# Patient Record
Sex: FEMALE | Race: WHITE | NOT HISPANIC OR LATINO | Employment: FULL TIME | URBAN - METROPOLITAN AREA
[De-identification: names, ages, dates, MRNs, and addresses within clinical notes are randomized per-mention and may not be internally consistent; named-entity substitution may affect disease eponyms.]

---

## 2017-02-20 RX ORDER — THYROID, PORCINE 90 MG/1
TABLET ORAL
Qty: 30 TABLET | Refills: 12 | Status: SHIPPED | OUTPATIENT
Start: 2017-02-20 | End: 2017-06-02 | Stop reason: SDUPTHER

## 2017-03-27 ENCOUNTER — TELEPHONE (OUTPATIENT)
Dept: OPHTHALMOLOGY | Facility: CLINIC | Age: 57
End: 2017-03-27

## 2017-03-29 ENCOUNTER — OFFICE VISIT (OUTPATIENT)
Dept: OPHTHALMOLOGY | Facility: CLINIC | Age: 57
End: 2017-03-29
Payer: COMMERCIAL

## 2017-03-29 DIAGNOSIS — H04.123 DRY EYES: ICD-10-CM

## 2017-03-29 DIAGNOSIS — E05.00 GRAVES' DISEASE: Primary | ICD-10-CM

## 2017-03-29 DIAGNOSIS — H53.2 DIPLOPIA: ICD-10-CM

## 2017-03-29 PROCEDURE — 92004 COMPRE OPH EXAM NEW PT 1/>: CPT | Mod: S$GLB,,,

## 2017-03-29 PROCEDURE — 99999 PR PBB SHADOW E&M-EST. PATIENT-LVL II: CPT | Mod: PBBFAC,,,

## 2017-03-29 NOTE — MR AVS SNAPSHOT
[facility] - Ophthalmology  [address]  [facility] [address]  Phone: [phone]  Fax: [phone]                  Alma Luu Manuel   3/29/2017 8:00 AM   Office Visit    Description:  Female : 1960   Provider:  Rodo Carreon MD   Department:  [facility] - Ophthalmology           Reason for Visit     Eye Problem           Diagnoses this Visit        Comments    Graves' disease    -  Primary     Diplopia         Dry eyes                To Do List           Future Appointments        Provider Department Dept Phone    2017 10:30 AM Rodo Carreon MD [facility] Ophthalmology [phone]      Goals (5 Years of Data)     None      [facility]     [facility] -  Assistance  Registered nurses in the [facility] Center provide clinical advisement, health education, appointment booking, and other advisory services.  Call for this free service at [phone].             Medications           Message regarding Medications     Verify the changes and/or additions to your medication regime listed below are the same as discussed with your clinician today.  If any of these changes or additions are incorrect, please notify your healthcare provider.             Verify that the below list of medications is an accurate representation of the medications you are currently taking.  If none reported, the list may be blank. If incorrect, please contact your healthcare provider. Carry this list with you in case of emergency.           Current Medications     ARMOUR THYROID 90 mg Tab TAKE 1 TABLET BY MOUTH DAILY    estradiol (VIVELLE-DOT) 0.1 mg/24 hr PTSW Place 1 patch onto the skin twice a week.    medroxyPROGESTERone (PROVERA) 10 MG tablet TK 1 T PO  ONCE D    progesterone (PROMETRIUM) 100 MG capsule Take 1 capsule (100 mg total) by mouth once daily.    testosterone 50 mg/5 gram (1 %) Gel Apply 2-3 drops topically every Mon, Wed, Fri. Apply 2-3 drops to wrist     thyroid, pork, (ARMOUR THYROID) 90 mg Tab Take 1 tablet (90 mg total) by mouth Daily.    hydrochlorothiazide (HYDRODIURIL) 25 MG tablet Take 1 tablet (25 mg total) by mouth once daily.    hydrochlorothiazide (HYDRODIURIL) 25 MG tablet TAKE 1 TABLET BY MOUTH DAILY           Clinical Reference Information           Allergies as of 3/29/2017     Azithromycin (Bulk)      Immunizations Administered on Date of Encounter - 3/29/2017     None      Instructions    Return 5/7 for punctal plugs       Smoking Cessation     If you would like to quit smoking:   You may be eligible for free services if you are a [place] resident and started smoking cigarettes before September 1, 1988.  Call the Smoking Cessation Trust ([facility]) toll free at [phone] or [phone].   Call 8-709-QUIT-NOW if you do not meet the above criteria.            Language Assistance Services     ATTENTION: Language assistance services are available, free of charge. Please call [phone].      ATENCIÓN: Si habla español, tiene a hatfield disposición servicios gratuitos de asistencia lingüística. Llame al [phone].     CHÚ Ý: N?u b?n nói Ti?ng Vi?t, có các d?ch v? h? tr? ngôn ng? mi?n phí dành cho b?n. G?i s? [phone].         [facility] complies with applicable Federal civil rights laws and does not discriminate on the basis of race, color, national origin, age, disability, or sex.

## 2017-03-29 NOTE — PROGRESS NOTES
HPI     Eye Problem    Additional comments: starting to have diplopia about once a week. when   tired, or after a few drinks. seeing stars , last week           Comments   Had complete eye exam 12/30/16  Dry eyes plugs have healped  S/p orbital decompression 2012 (bilat soft tissue with canthoplasties)       Last edited by Rodo Carreon MD on 3/29/2017  8:34 AM. (History)            Assessment /Plan     For exam results, see Encounter Report.    Graves' disease    Diplopia    Dry eyes        I have reviewed the patient history,review of systems,and findings as recorded by the technician and resident and accept.  Has lt Inf Oblique weakness prob secondary o head trauma  Will rtc for plugs

## 2017-04-07 ENCOUNTER — PROCEDURE VISIT (OUTPATIENT)
Dept: OPHTHALMOLOGY | Facility: CLINIC | Age: 57
End: 2017-04-07
Payer: COMMERCIAL

## 2017-04-07 VITALS — HEART RATE: 87 BPM | DIASTOLIC BLOOD PRESSURE: 101 MMHG | SYSTOLIC BLOOD PRESSURE: 161 MMHG

## 2017-04-07 DIAGNOSIS — H04.123 DRY EYES: Primary | ICD-10-CM

## 2017-04-07 PROCEDURE — 99499 UNLISTED E&M SERVICE: CPT | Mod: S$GLB,,,

## 2017-04-07 NOTE — PATIENT INSTRUCTIONS
Follow up with Dr. Darling to discuss inf lid sgjimi -- advised would almost assuredly  Be considered cosmetic but she wants to talk about it anyway.        Controlling High Blood Pressure  High blood pressure (hypertension) is often called the silent killer. This is because many people who have it dont know it. High blood pressure is defined as 140/90 mm Hg or higher. Know your blood pressure and remember to check it regularly. Doing so can save your life. Here are some things you can do to help control your blood pressure.    Choose heart-healthy foods  · Select low-salt, low-fat foods. Limit sodium intake to 2,400 mg per day or the amount suggested by your healthcare provider.  · Limit canned, dried, cured, packaged, and fast foods. These can contain a lot of salt.  · Eat 8 to 10 servings of fruits and vegetables every day.  · Choose lean meats, fish, or chicken.  · Eat whole-grain pasta, brown rice, and beans.  · Eat 2 to 3 servings of low-fat or fat-free dairy products.  · Ask your doctor about the DASH eating plan. This plan helps reduce blood pressure.  · When you go to a restaurant, ask that your meal be prepared with no added salt.  Maintain a healthy weight  · Ask your healthcare provider how many calories to eat a day. Then stick to that number.  · Ask your healthcare provider what weight range is healthiest for you. If you are overweight, a weight loss of only 3% to 5% of your body weight can help lower blood pressure. Generally, a good weight loss goal is to lose 10% of your body weight in a year.  · Limit snacks and sweets.  · Get regular exercise.  Get up and get active  · Choose activities you enjoy. Find ones you can do with friends or family. This includes bicycling, dancing, walking, and jogging.  · Park farther away from building entrances.  · Use stairs instead of the elevator.  · When you can, walk or bike instead of driving.  · [place] leaves, garden, or do household repairs.  · Be active at a  moderate to vigorous level of physical activity for at least 40 minutes for a minimum of 3 to 4 days a week.   Manage stress  · Make time to relax and enjoy life. Find time to laugh.  · Communicate your concerns with your loved ones and your healthcare provider.  · Visit with family and friends, and keep up with hobbies.  Limit alcohol and quit smoking  · Men should have no more than 2 drinks per day.  · Women should have no more than 1 drink per day.  · Talk with your healthcare provider about quitting smoking. Smoking significantly increases your risk for heart disease and stroke. Ask your healthcare provider about community smoking cessation programs and other options.  Medicines  If lifestyle changes arent enough, your healthcare provider may prescribe high blood pressure medicine. Take all medicines as prescribed. If you have any questions about your medicines, ask your healthcare provider before stopping or changing them.   © 9834-7636 [employer]. [address] [place] [address], [place] [address]. All rights reserved. This information is not intended as a substitute for professional medical care. Always follow your healthcare professional's instructions.

## 2017-04-07 NOTE — PROGRESS NOTES
HPI     Graves' pt s/p bilat decompressions. Residual dryness.  Occ diplopia but can resolve by changing head position       Last edited by Rodo Carreon MD on 4/7/2017 10:54 AM.         Assessment /Plan     For exam results, see Encounter Report.    Dry eyes        I have reviewed the patient history,review of systems,and findings as recorded by the technician and resident and accept.  After topical anes,5mm plugs inserted in inf punctum bilat.

## 2017-04-07 NOTE — Clinical Note
Had bilat decompressions several yrs ago,good result but Lower lids starting to sag a bit and she wants to talk about Tightening up (advised would prob not be covered since essentially cosmetic)

## 2017-04-20 ENCOUNTER — PATIENT MESSAGE (OUTPATIENT)
Dept: ENDOCRINOLOGY | Facility: CLINIC | Age: 57
End: 2017-04-20

## 2017-04-20 ENCOUNTER — PATIENT MESSAGE (OUTPATIENT)
Dept: OBSTETRICS AND GYNECOLOGY | Facility: CLINIC | Age: 57
End: 2017-04-20

## 2017-04-20 DIAGNOSIS — Z78.0 MENOPAUSE: ICD-10-CM

## 2017-04-21 RX ORDER — PROGESTERONE 100 MG/1
100 CAPSULE ORAL DAILY
Qty: 90 CAPSULE | Refills: 3 | Status: SHIPPED | OUTPATIENT
Start: 2017-04-21 | End: 2017-10-16

## 2017-04-21 RX ORDER — ESTRADIOL 0.1 MG/D
FILM, EXTENDED RELEASE TRANSDERMAL
Qty: 24 PATCH | Refills: 3 | Status: SHIPPED | OUTPATIENT
Start: 2017-04-21 | End: 2017-05-04 | Stop reason: SDUPTHER

## 2017-04-24 ENCOUNTER — TELEPHONE (OUTPATIENT)
Dept: ENDOCRINOLOGY | Facility: CLINIC | Age: 57
End: 2017-04-24

## 2017-04-24 ENCOUNTER — TELEPHONE (OUTPATIENT)
Dept: OBSTETRICS AND GYNECOLOGY | Facility: CLINIC | Age: 57
End: 2017-04-24

## 2017-04-24 RX ORDER — THYROID 90 MG/1
1 TABLET ORAL DAILY
Qty: 96 TABLET | Refills: 3 | Status: CANCELLED | OUTPATIENT
Start: 2017-04-24

## 2017-04-24 NOTE — TELEPHONE ENCOUNTER
----- Message from Elo Jiménez sent at 4/24/2017 10:50 AM CDT -----  Contact: pt  x_  1st Request  _  2nd Request  _  3rd Request    Please refill the medication(s) listed below.     Medication #1medroxyPROGESTERone (PROVERA) 10 MG tablet  3 month supply     Medication #2estradiol (VIVELLE-DOT) 0.1 mg/24 hr PTSW 3 month supply       Preferred Pharmacy:[facility] [address] [place] [address] AT [address]

## 2017-05-03 ENCOUNTER — PATIENT MESSAGE (OUTPATIENT)
Dept: OBSTETRICS AND GYNECOLOGY | Facility: CLINIC | Age: 57
End: 2017-05-03

## 2017-05-04 DIAGNOSIS — Z78.0 MENOPAUSE: ICD-10-CM

## 2017-05-05 DIAGNOSIS — Z78.0 MENOPAUSE: ICD-10-CM

## 2017-05-05 RX ORDER — ESTRADIOL 0.1 MG/D
FILM, EXTENDED RELEASE TRANSDERMAL
Qty: 24 PATCH | Refills: 3 | Status: SHIPPED | OUTPATIENT
Start: 2017-05-05 | End: 2017-05-05 | Stop reason: SDUPTHER

## 2017-05-05 RX ORDER — ESTRADIOL 0.1 MG/D
FILM, EXTENDED RELEASE TRANSDERMAL
Qty: 25 PATCH | Refills: 3 | Status: SHIPPED | OUTPATIENT
Start: 2017-05-05 | End: 2018-07-05

## 2017-05-25 ENCOUNTER — INITIAL CONSULT (OUTPATIENT)
Dept: OPHTHALMOLOGY | Facility: CLINIC | Age: 57
End: 2017-05-25
Payer: COMMERCIAL

## 2017-05-25 DIAGNOSIS — H53.2 DIPLOPIA: ICD-10-CM

## 2017-05-25 DIAGNOSIS — H01.00A BLEPHARITIS OF UPPER AND LOWER EYELIDS OF BOTH EYES, UNSPECIFIED TYPE: ICD-10-CM

## 2017-05-25 DIAGNOSIS — H01.00B BLEPHARITIS OF UPPER AND LOWER EYELIDS OF BOTH EYES, UNSPECIFIED TYPE: ICD-10-CM

## 2017-05-25 DIAGNOSIS — E05.00 GRAVES' DISEASE: ICD-10-CM

## 2017-05-25 DIAGNOSIS — H04.123 DRY EYES: Primary | ICD-10-CM

## 2017-05-25 PROCEDURE — 92014 COMPRE OPH EXAM EST PT 1/>: CPT | Mod: S$GLB,,, | Performed by: OPHTHALMOLOGY

## 2017-05-25 PROCEDURE — 99999 PR PBB SHADOW E&M-EST. PATIENT-LVL I: CPT | Mod: PBBFAC,,, | Performed by: OPHTHALMOLOGY

## 2017-05-25 RX ORDER — HYDROCODONE BITARTRATE AND ACETAMINOPHEN 5; 325 MG/1; MG/1
TABLET ORAL
Refills: 0 | COMMUNITY
Start: 2017-04-20 | End: 2019-01-16

## 2017-05-25 RX ORDER — DOXYCYCLINE HYCLATE 50 MG/1
50 CAPSULE ORAL 2 TIMES DAILY
Qty: 60 CAPSULE | Refills: 1 | Status: SHIPPED | OUTPATIENT
Start: 2017-05-25 | End: 2018-02-21

## 2017-05-25 RX ORDER — CLONAZEPAM 0.5 MG/1
TABLET ORAL
Refills: 3 | COMMUNITY
Start: 2017-04-20 | End: 2018-11-29

## 2017-05-25 RX ORDER — ERGOCALCIFEROL 1.25 MG/1
50000 CAPSULE ORAL
Refills: 2 | COMMUNITY
Start: 2017-04-18 | End: 2017-10-16

## 2017-05-25 NOTE — LETTER
May 25, 2017      Rodo Carreon MD  [address]  [facility] [address]           [facility] - Ophthalmology  [address]  [place] [address]  Phone: [phone]  Fax: [phone]          Patient: Alma Jackson   MR Number: [id number]   YOB: 1960   Date of Visit: 5/25/2017       Dear Dr. Rodo Carreon:    Thank you for referring Alma Jackson to me for evaluation. Attached you will find relevant portions of my assessment and plan of care.    If you have questions, please do not hesitate to call me. I look forward to following Alma Jackson along with you.    Sincerely,    Elodia Darling MD    Enclosure  CC:  No Recipients    If you would like to receive this communication electronically, please contact [email or web address] or [phone] to request more information on [employer] access.    For providers and/or their staff who would like to refer a patient to [facility], please contact us through our one-stop-shop provider referral line, [facility], at [phone].    If you feel you have received this communication in error or would no longer like to receive these types of communications, please e-mail [email or web address]

## 2017-06-02 ENCOUNTER — TELEPHONE (OUTPATIENT)
Dept: ENDOCRINOLOGY | Facility: CLINIC | Age: 57
End: 2017-06-02

## 2017-06-02 ENCOUNTER — OFFICE VISIT (OUTPATIENT)
Dept: ENDOCRINOLOGY | Facility: CLINIC | Age: 57
End: 2017-06-02
Payer: COMMERCIAL

## 2017-06-02 VITALS
HEIGHT: 62 IN | DIASTOLIC BLOOD PRESSURE: 91 MMHG | SYSTOLIC BLOOD PRESSURE: 153 MMHG | WEIGHT: 148.56 LBS | BODY MASS INDEX: 27.34 KG/M2 | HEART RATE: 86 BPM

## 2017-06-02 DIAGNOSIS — E07.9 THYROID EYE DISEASE: ICD-10-CM

## 2017-06-02 DIAGNOSIS — H57.89 THYROID EYE DISEASE: ICD-10-CM

## 2017-06-02 DIAGNOSIS — E55.9 VITAMIN D DEFICIENCY: ICD-10-CM

## 2017-06-02 DIAGNOSIS — I49.9 IRREGULAR HEART BEAT: ICD-10-CM

## 2017-06-02 DIAGNOSIS — E89.0 POSTABLATIVE HYPOTHYROIDISM: Primary | ICD-10-CM

## 2017-06-02 PROCEDURE — 99999 PR PBB SHADOW E&M-EST. PATIENT-LVL IV: CPT | Mod: PBBFAC,,, | Performed by: INTERNAL MEDICINE

## 2017-06-02 PROCEDURE — 99214 OFFICE O/P EST MOD 30 MIN: CPT | Mod: S$GLB,,, | Performed by: INTERNAL MEDICINE

## 2017-06-02 RX ORDER — THYROID 90 MG/1
TABLET ORAL
Qty: 102 TABLET | Refills: 3 | Status: SHIPPED | OUTPATIENT
Start: 2017-06-02 | End: 2017-07-11 | Stop reason: SDUPTHER

## 2017-06-02 NOTE — TELEPHONE ENCOUNTER
----- Message from Reena Sharona sent at 6/2/2017 12:48 PM CDT -----  Contact: [phone]   Alma Reyessays she wants to be seen today.   Says she is having Thyroid problems.      Pls call today w/ an appt. .  Will see any dr..   As per caller. .

## 2017-06-02 NOTE — TELEPHONE ENCOUNTER
Spoke with patient whom stated it is an emergency that she be seen today because she has been trying to get in with

## 2017-06-02 NOTE — PROGRESS NOTES
"Chief Complaint: Hypothyroidism      HPI:  Alma Jackson is 56 y.o. female with preexisting thyroid disease.  She is a prior patient of Dr. Rodriguez with last documented office visit in 07/2016     She presents to clinic today for an Urgent appointment to discuss abnormal lab results     She was originally diagnosed with Graves disease and is s/p LICEA. She cannot remember how long ago this was.     Originally on Levothyroxine but she switched to [place] thyroid "many years ago"   She states she feels better on [place] thyroid     She had labs in 04/2017 per PCP that showed TSH - 6.94   The patient messaged Dr. Rodriguez about the results and he responded with a dose adjustment but the patient states she did not receive the message     Today, she endorses hair loss, weight gain and anxiety   She is taking [place] Thyroid 90 mg daily      Review of Systems   Constitutional: Positive for fatigue and unexpected weight change.   Eyes: Positive for visual disturbance.   Respiratory: Negative for cough and shortness of breath.    Cardiovascular: Negative for palpitations.   Endocrine: Negative for cold intolerance and heat intolerance.   Musculoskeletal: Positive for myalgias.   Skin: Negative for rash.        Hair loss    Psychiatric/Behavioral: The patient is nervous/anxious.        Physical Exam   Constitutional: She is oriented to person, place, and time. No distress.   Neck: No thyromegaly present.   Cardiovascular: Normal rate.    Pulmonary/Chest: Effort normal.   Abdominal: Soft.   Musculoskeletal: She exhibits no edema.   Neurological: She is alert and oriented to person, place, and time.   Nursing note and vitals reviewed.      Labs: from 4/13/2017  Outside labs per [facility]   TSH - 6.94 mIU/L   Free T4 - 0.6 ng/dL   Vitamin D - 17   Labs scanned in media tab   Lab Results   Component Value Date    TSH 0.438 07/12/2016          Assessment and Plan:  1. Postablative hypothyroidism  - per Dr. Rodriguez " instructions , advised to take 1.5 tabs on Sunday and 1 tab other days of the week   - offered to recheck TSH today, pt declined   - states she will start taking Universal thyroid as Dr. Rodriguez directed   - repeat TSH in 6 weeks   - pt requested follow up with Dr. Rodriguez for 10/2017 , will arrange today   - goal of therapy is a normal TSH   - avoid exogenous hyperthyroidism as this can accelerate bone loss and increase risk of CV complications   -     thyroid, pork, (ARMOUR THYROID) 90 mg Tab; Take 1.5 tabs on Sunday only, then take 1 tablet all other days  Dispense: 102 tablet; Refill: 3  -     TSH; Future; Expected date: 06/02/2017    2. Thyroid eye disease - Both Eyes  - followed by Ophthalmology     3. Irregular heart beat  - followed by Cardiology     4. Vitamin D deficiency  - continue Ergo as prescribed   - start otc Vitamin D3 at 1,000 IU's daily   - repeat level in 6 weeks   -     Vitamin D; Future; Expected date: 06/02/2017      Labs in 6 weeks   Follow up with Dr. Rodriguez in 10/2017

## 2017-07-06 ENCOUNTER — OFFICE VISIT (OUTPATIENT)
Dept: OPHTHALMOLOGY | Facility: CLINIC | Age: 57
End: 2017-07-06
Payer: COMMERCIAL

## 2017-07-06 DIAGNOSIS — E05.00 GRAVES' DISEASE: ICD-10-CM

## 2017-07-06 DIAGNOSIS — H01.00A BLEPHARITIS OF UPPER AND LOWER EYELIDS OF BOTH EYES, UNSPECIFIED TYPE: ICD-10-CM

## 2017-07-06 DIAGNOSIS — H01.00B BLEPHARITIS OF UPPER AND LOWER EYELIDS OF BOTH EYES, UNSPECIFIED TYPE: ICD-10-CM

## 2017-07-06 DIAGNOSIS — H04.123 DRY EYES: Primary | ICD-10-CM

## 2017-07-06 PROCEDURE — 92014 COMPRE OPH EXAM EST PT 1/>: CPT | Mod: 25,S$GLB,, | Performed by: OPHTHALMOLOGY

## 2017-07-06 PROCEDURE — 68761 CLOSE TEAR DUCT OPENING: CPT | Mod: 50,S$GLB,, | Performed by: OPHTHALMOLOGY

## 2017-07-06 PROCEDURE — 99999 PR PBB SHADOW E&M-EST. PATIENT-LVL III: CPT | Mod: PBBFAC,,, | Performed by: OPHTHALMOLOGY

## 2017-07-06 NOTE — PROGRESS NOTES
HPI     Follow-up    Additional comments: 2 week follow-up           Comments   Pt here for 2 week follow-up, dry eyes and blepharitis.  Pt states she is doing well, diplopia has decreased and eyes are not as   irritated.   States eyes are still dry, but have improved. She does not feel to be 100%   better but has noticed a difference.  Pt complaints of decrease in vision, states she was 20/20 OU in December 2016.  Meds: Systane TID              Systane night at bedtime?       Last edited by Mae Huggins, PCT on 7/6/2017  1:43 PM. (History)            Assessment /Plan     For exam results, see Encounter Report.    Dry eyes    Blepharitis of upper and lower eyelids of both eyes, unspecified type    Graves' disease      Plan for punctal plug placement bilateral upper eyelids.  Discussed risk of excess tearing and irritation and the plugs may need to be removed in the future.    Procedure note:  OD rul punctum: 0.4 mm eagle punctal plug placed without complication  OS ej punctum: 0.4 mm eagle punctal plug placed without complication    Informed consent obtained after extensive risks/benefits/alternatives were discussed with the patient including but not limited to pain, bleeding, infection, ocular injury.  Alternatives such as waiting were discussed.  All questions were answered.        Continue lid hygiene and warm compresses, artificial tears and ointment.    Return in three months prn sooner any worsening.

## 2017-07-07 ENCOUNTER — LAB VISIT (OUTPATIENT)
Dept: LAB | Facility: HOSPITAL | Age: 57
End: 2017-07-07
Attending: INTERNAL MEDICINE
Payer: COMMERCIAL

## 2017-07-07 DIAGNOSIS — E55.9 VITAMIN D DEFICIENCY: ICD-10-CM

## 2017-07-07 DIAGNOSIS — E89.0 POSTABLATIVE HYPOTHYROIDISM: ICD-10-CM

## 2017-07-07 LAB
25(OH)D3+25(OH)D2 SERPL-MCNC: 32 NG/ML
T4 FREE SERPL-MCNC: 0.72 NG/DL
TSH SERPL DL<=0.005 MIU/L-ACNC: 6.01 UIU/ML

## 2017-07-07 PROCEDURE — 84443 ASSAY THYROID STIM HORMONE: CPT

## 2017-07-07 PROCEDURE — 36415 COLL VENOUS BLD VENIPUNCTURE: CPT | Mod: PO

## 2017-07-07 PROCEDURE — 84439 ASSAY OF FREE THYROXINE: CPT

## 2017-07-07 PROCEDURE — 82306 VITAMIN D 25 HYDROXY: CPT

## 2017-07-11 ENCOUNTER — PATIENT MESSAGE (OUTPATIENT)
Dept: ENDOCRINOLOGY | Facility: CLINIC | Age: 57
End: 2017-07-11

## 2017-07-11 DIAGNOSIS — E89.0 POSTABLATIVE HYPOTHYROIDISM: Primary | ICD-10-CM

## 2017-07-11 RX ORDER — THYROID 90 MG/1
TABLET ORAL
Qty: 102 TABLET | Refills: 3
Start: 2017-07-11 | End: 2018-06-08 | Stop reason: SDUPTHER

## 2017-07-18 RX ORDER — ESTRADIOL 0.1 MG/D
FILM, EXTENDED RELEASE TRANSDERMAL
Qty: 8 PATCH | Refills: 0 | OUTPATIENT
Start: 2017-07-18

## 2017-08-25 ENCOUNTER — LAB VISIT (OUTPATIENT)
Dept: LAB | Facility: HOSPITAL | Age: 57
End: 2017-08-25
Attending: INTERNAL MEDICINE
Payer: COMMERCIAL

## 2017-08-25 DIAGNOSIS — E89.0 POSTABLATIVE HYPOTHYROIDISM: ICD-10-CM

## 2017-08-25 LAB — TSH SERPL DL<=0.005 MIU/L-ACNC: 2.18 UIU/ML

## 2017-08-25 PROCEDURE — 36415 COLL VENOUS BLD VENIPUNCTURE: CPT | Mod: PO

## 2017-08-25 PROCEDURE — 84443 ASSAY THYROID STIM HORMONE: CPT

## 2017-09-26 ENCOUNTER — TELEPHONE (OUTPATIENT)
Dept: OBSTETRICS AND GYNECOLOGY | Facility: CLINIC | Age: 57
End: 2017-09-26

## 2017-09-26 DIAGNOSIS — R53.83 FATIGUE, UNSPECIFIED TYPE: ICD-10-CM

## 2017-09-26 DIAGNOSIS — N95.1 MENOPAUSAL SYNDROME (HOT FLASHES): Primary | ICD-10-CM

## 2017-10-16 ENCOUNTER — OFFICE VISIT (OUTPATIENT)
Dept: OPHTHALMOLOGY | Facility: CLINIC | Age: 57
End: 2017-10-16
Payer: COMMERCIAL

## 2017-10-16 ENCOUNTER — OFFICE VISIT (OUTPATIENT)
Dept: ENDOCRINOLOGY | Facility: CLINIC | Age: 57
End: 2017-10-16
Payer: COMMERCIAL

## 2017-10-16 ENCOUNTER — LAB VISIT (OUTPATIENT)
Dept: LAB | Facility: HOSPITAL | Age: 57
End: 2017-10-16
Attending: OBSTETRICS & GYNECOLOGY
Payer: COMMERCIAL

## 2017-10-16 VITALS
WEIGHT: 150.56 LBS | HEIGHT: 62 IN | BODY MASS INDEX: 27.7 KG/M2 | HEART RATE: 73 BPM | SYSTOLIC BLOOD PRESSURE: 128 MMHG | DIASTOLIC BLOOD PRESSURE: 76 MMHG

## 2017-10-16 DIAGNOSIS — R53.83 FATIGUE, UNSPECIFIED TYPE: ICD-10-CM

## 2017-10-16 DIAGNOSIS — E05.00 GRAVES' DISEASE WITH EXOPHTHALMOS: ICD-10-CM

## 2017-10-16 DIAGNOSIS — H01.00A BLEPHARITIS OF UPPER AND LOWER EYELIDS OF BOTH EYES, UNSPECIFIED TYPE: ICD-10-CM

## 2017-10-16 DIAGNOSIS — H04.123 DRY EYES: ICD-10-CM

## 2017-10-16 DIAGNOSIS — E89.0 POSTABLATIVE HYPOTHYROIDISM: Primary | ICD-10-CM

## 2017-10-16 DIAGNOSIS — N95.1 MENOPAUSAL SYNDROME (HOT FLASHES): ICD-10-CM

## 2017-10-16 DIAGNOSIS — H01.00B BLEPHARITIS OF UPPER AND LOWER EYELIDS OF BOTH EYES, UNSPECIFIED TYPE: ICD-10-CM

## 2017-10-16 DIAGNOSIS — E05.00 GRAVES DISEASE: Primary | ICD-10-CM

## 2017-10-16 LAB — ESTRADIOL SERPL-MCNC: 17 PG/ML

## 2017-10-16 PROCEDURE — 99999 PR PBB SHADOW E&M-EST. PATIENT-LVL III: CPT | Mod: PBBFAC,,, | Performed by: INTERNAL MEDICINE

## 2017-10-16 PROCEDURE — 84402 ASSAY OF FREE TESTOSTERONE: CPT

## 2017-10-16 PROCEDURE — 36415 COLL VENOUS BLD VENIPUNCTURE: CPT

## 2017-10-16 PROCEDURE — 99999 PR PBB SHADOW E&M-EST. PATIENT-LVL III: CPT | Mod: PBBFAC,,, | Performed by: OPHTHALMOLOGY

## 2017-10-16 PROCEDURE — 92012 INTRM OPH EXAM EST PATIENT: CPT | Mod: S$GLB,,, | Performed by: OPHTHALMOLOGY

## 2017-10-16 PROCEDURE — 99214 OFFICE O/P EST MOD 30 MIN: CPT | Mod: S$GLB,,, | Performed by: INTERNAL MEDICINE

## 2017-10-16 PROCEDURE — 82670 ASSAY OF TOTAL ESTRADIOL: CPT

## 2017-10-16 RX ORDER — BACLOFEN 10 MG/1
10 TABLET ORAL
Refills: 1 | COMMUNITY
Start: 2017-08-31 | End: 2023-02-24

## 2017-10-16 NOTE — PROGRESS NOTES
HPI     Mrs. Marquez is here today for a dry eye follow-up last seen on 7/6/2017.   She states she is doing well. She has no complaints today. She denies any   eye pain or irritation. She is using AT (unsure of name) drops OU BID    Last edited by Mae Huggins, PCT on 10/16/2017  9:54 AM. (History)            Assessment /Plan     For exam results, see Encounter Report.    Graves disease    Dry eyes    Blepharitis of upper and lower eyelids of both eyes, unspecified type    Other orders  -     Cancel: External/Slit Lamp Photography    Overall, patient doing great!    Bilateral upper eyelid punctal plugs have fallen out since last visit. Patient comfortable.      Patient with blepharitis. Continue warm compresses daily. Continue lid scrubs. Continue doxycycline 50 mg po bid. Continue using mask at night to prevent further irritation.    Return to Dr. Martinez for routine eye care.     Return in one year for follow-up check

## 2017-10-16 NOTE — PROGRESS NOTES
"Subjective:      Patient ID: Alma Jackson is a 57 y.o. female.    Chief Complaint:  Hypothyroidism      History of Present Illness  54 year old female with hypothyroidism presents for follow up of disease management. Pt was originally diagnosed with Graves' disease and is now s/p LICEA therapy. She cannot remember how long ago this was. She was originally on synthroid, but switched to [place] thyroid 120 mg daily. She states that she feels better on the armour thyroid than synthroid. She takes her medication in the am on an empty stomach. She takes it with her HCTZ. Pt is extremely frustrated as she continues to feel extreme fatigue that has become worse in the last 3 months. She also reports muscle aches in her extremies, most notably in her hips and lower extremities. She states the pain is "like a headache". She states that it is constant and 5-6/10. She reports sleeping 5 hours at night and wakes up unrefreshed because she is in pain when she sleeps. She does not notice the pain when she is at work. She has tried taking celebrex in the past with no relief. She has also tried percocet and this provides relief. She reports heat intolerance, but denies any hair loss, skin changes, change in mood. She also reports weight gain of 40 lbs over several months. She exercises frequently and maintains a healthy diet.     Review of Systems   Constitutional: Positive for fatigue and unexpected weight change (gain). Negative for activity change and appetite change.   HENT: Negative for trouble swallowing and voice change.    Respiratory: Negative for shortness of breath and wheezing.    Cardiovascular: Negative for chest pain and palpitations.   Gastrointestinal: Negative for constipation, diarrhea, nausea and vomiting.   Endocrine: Positive for heat intolerance. Negative for cold intolerance.   Musculoskeletal: Positive for arthralgias and myalgias. Negative for gait problem and joint swelling.   Skin: Negative for " pallor and rash.   Neurological: Negative for tremors and headaches.   Psychiatric/Behavioral: Positive for agitation. Negative for dysphoric mood. The patient is not nervous/anxious.        Objective:   Physical Exam   Constitutional: She is oriented to person, place, and time. She appears well-developed and well-nourished. No distress.   Eyes: Conjunctivae and EOM are normal. Pupils are equal, round, and reactive to light.   Neck: Normal range of motion. Neck supple. No thyromegaly present.   Cardiovascular: Normal rate, regular rhythm, normal heart sounds and intact distal pulses.  Exam reveals no gallop and no friction rub.    No murmur heard.  Pulmonary/Chest: Effort normal and breath sounds normal. No respiratory distress. She has no wheezes. She has no rales.   Abdominal: Soft. Bowel sounds are normal. She exhibits no distension and no mass. There is no tenderness.   Musculoskeletal: Normal range of motion. She exhibits tenderness (Multiple points of tenderness in knees, hips, neck, elbows; no swelling or redness). She exhibits no edema.   Neurological: She is alert and oriented to person, place, and time.   Skin: Skin is warm and dry. No rash noted. She is not diaphoretic. No erythema.   Psychiatric: She has a normal mood and affect.       Lab Review:   Lab Visit on 08/25/2017   Component Date Value    TSH 08/25/2017 2.183    Lab Visit on 07/07/2017   Component Date Value    TSH 07/07/2017 6.015*    Vit D, 25-Hydroxy 07/07/2017 32     Free T4 07/07/2017 0.72    Office Visit on 04/24/2017   Component Date Value    Right Vent (Diastole) 05/02/2017 2.1     Septum (Diastole) 05/02/2017 1.0     Left Ventricle (Diastole) 05/02/2017 5.1     Posterior Wall (Diastole) 05/02/2017 0.94     Aortic Valve (Diastole) 05/02/2017 1.7     Aortic Root (Diastole) 05/02/2017 2.8     Left Atrium (Diastole) 05/02/2017 3.5     Left Ventricle (Systole) 05/02/2017 3.5     EF 05/02/2017 62     Aortic Peak Gradient  05/02/2017 7     Aortic Mean Gradient 05/02/2017 3     Aortic Peak Velocity 05/02/2017 1.3     Mitral Valve Area 05/02/2017 5     Mitral Valve E-A Ratio 05/02/2017 0.7*    Mitral Valve E-E prime 05/02/2017 5.5     Pulmonary Artery Pressure 05/02/2017 21        Assessment:       Plan:     54 year old female who presents for disease management.    1. Hypothyroidism with normal TSH  --Continue armour thyroid 5/7 days a week      RTC in 6 months or sooner if problems arise.

## 2017-10-17 LAB — TESTOST FREE SERPL-MCNC: 0.9 PG/ML

## 2017-10-18 ENCOUNTER — PATIENT MESSAGE (OUTPATIENT)
Dept: OBSTETRICS AND GYNECOLOGY | Facility: CLINIC | Age: 57
End: 2017-10-18

## 2017-10-24 ENCOUNTER — OFFICE VISIT (OUTPATIENT)
Dept: OBSTETRICS AND GYNECOLOGY | Facility: CLINIC | Age: 57
End: 2017-10-24
Attending: OBSTETRICS & GYNECOLOGY
Payer: COMMERCIAL

## 2017-10-24 VITALS
SYSTOLIC BLOOD PRESSURE: 130 MMHG | BODY MASS INDEX: 27.86 KG/M2 | WEIGHT: 152.31 LBS | DIASTOLIC BLOOD PRESSURE: 82 MMHG

## 2017-10-24 DIAGNOSIS — Z78.0 MENOPAUSE: ICD-10-CM

## 2017-10-24 DIAGNOSIS — Z12.4 PAP SMEAR FOR CERVICAL CANCER SCREENING: Primary | ICD-10-CM

## 2017-10-24 DIAGNOSIS — Z01.419 WELL WOMAN EXAM WITH ROUTINE GYNECOLOGICAL EXAM: ICD-10-CM

## 2017-10-24 PROCEDURE — 99396 PREV VISIT EST AGE 40-64: CPT | Mod: S$GLB,,, | Performed by: OBSTETRICS & GYNECOLOGY

## 2017-10-24 PROCEDURE — 87624 HPV HI-RISK TYP POOLED RSLT: CPT

## 2017-10-24 PROCEDURE — 88175 CYTOPATH C/V AUTO FLUID REDO: CPT

## 2017-10-24 NOTE — PROGRESS NOTES
Subjective:       Patient ID: Alma Jackson is a 57 y.o. female.    Chief Complaint:  Well Woman      History of Present Illness  HPI  This 57 yr old P2 female is here for routine exam and has low estradiol on estradiol patch (vivelle 0.1) and provera.  She doesn't feel well. She had some break through bleeding last yr and did ultrasound    GYN & OB History  No LMP recorded. Patient is postmenopausal.   Date of Last Pap: 2014    OB History    Para Term  AB Living   2 2           SAB TAB Ectopic Multiple Live Births                  # Outcome Date GA Lbr Arvind/2nd Weight Sex Delivery Anes PTL Lv   2 Para            1 Para                   Review of Systems  Review of Systems   Constitutional: Positive for fatigue. Negative for chills and fever.   Respiratory: Negative for shortness of breath.    Cardiovascular: Negative for chest pain.   Gastrointestinal: Negative for abdominal pain, nausea and vomiting.   Genitourinary: Negative for difficulty urinating, dyspareunia, genital sores, menstrual problem, pelvic pain, vaginal bleeding, vaginal discharge and vaginal pain.   Skin: Negative for wound.   Hematological: Negative for adenopathy.   Psychiatric/Behavioral: Positive for decreased concentration and sleep disturbance.           Objective:    Physical Exam:   Constitutional: She is oriented to person, place, and time. She appears well-developed and well-nourished.    HENT:   Head: Normocephalic.    Eyes: EOM are normal.    Neck: Normal range of motion.    Cardiovascular: Normal rate.     Pulmonary/Chest: Effort normal. She exhibits no mass and no tenderness. Right breast exhibits no inverted nipple, no mass, no skin change and no tenderness. Left breast exhibits no inverted nipple, no mass, no skin change and no tenderness.        Abdominal: Soft. She exhibits no distension. There is no tenderness.     Genitourinary: Vagina normal and uterus normal. There is no rash, tenderness or lesion  on the right labia. There is no rash, tenderness or lesion on the left labia. Uterus is not tender. Cervix is normal. Right adnexum displays no mass, no tenderness and no fullness. Left adnexum displays no mass, no tenderness and no fullness. Cervix exhibits no discharge.           Musculoskeletal: Normal range of motion.       Neurological: She is alert and oriented to person, place, and time.    Skin: Skin is warm and dry.    Psychiatric: She has a normal mood and affect.          Assessment:        1. Pap smear for cervical cancer screening    2. Well woman exam with routine gynecological exam    3. Menopause               Plan:       will change estradiol to pill from patio and prometrium to pill from patio  Pap today   Pt refuses mammogram but every two yrs.

## 2017-10-30 LAB
HPV16 AG SPEC QL: NEGATIVE
HPV16+18+H RISK 12 DNA CVX-IMP: NEGATIVE
HPV18 DNA SPEC QL NAA+PROBE: NEGATIVE

## 2018-01-11 ENCOUNTER — PATIENT MESSAGE (OUTPATIENT)
Dept: OBSTETRICS AND GYNECOLOGY | Facility: CLINIC | Age: 58
End: 2018-01-11

## 2018-01-11 DIAGNOSIS — R53.83 FATIGUE, UNSPECIFIED TYPE: ICD-10-CM

## 2018-01-11 DIAGNOSIS — N95.1 MENOPAUSAL SYMPTOMS: ICD-10-CM

## 2018-01-11 DIAGNOSIS — Z12.31 VISIT FOR SCREENING MAMMOGRAM: Primary | ICD-10-CM

## 2018-01-12 ENCOUNTER — HOSPITAL ENCOUNTER (OUTPATIENT)
Dept: RADIOLOGY | Facility: HOSPITAL | Age: 58
Discharge: HOME | End: 2018-01-12
Attending: OBSTETRICS & GYNECOLOGY
Payer: COMMERCIAL

## 2018-01-12 VITALS — HEIGHT: 62 IN | WEIGHT: 152 LBS | BODY MASS INDEX: 27.97 KG/M2

## 2018-01-12 DIAGNOSIS — Z12.31 VISIT FOR SCREENING MAMMOGRAM: ICD-10-CM

## 2018-01-12 PROCEDURE — 77063 BREAST TOMOSYNTHESIS BI: CPT | Mod: 26,,, | Performed by: RADIOLOGY

## 2018-01-12 PROCEDURE — 77067 SCR MAMMO BI INCL CAD: CPT | Mod: TC

## 2018-01-12 PROCEDURE — 77067 SCR MAMMO BI INCL CAD: CPT | Mod: 26,,, | Performed by: RADIOLOGY

## 2018-02-19 ENCOUNTER — LAB VISIT (OUTPATIENT)
Dept: LAB | Facility: HOSPITAL | Age: 58
End: 2018-02-19
Attending: OBSTETRICS & GYNECOLOGY
Payer: COMMERCIAL

## 2018-02-19 DIAGNOSIS — R53.83 FATIGUE, UNSPECIFIED TYPE: ICD-10-CM

## 2018-02-19 DIAGNOSIS — N95.1 MENOPAUSAL SYMPTOMS: ICD-10-CM

## 2018-02-19 LAB
DHEA-S SERPL-MCNC: 94.2 UG/DL
ESTRADIOL SERPL-MCNC: 16 PG/ML
PROGEST SERPL-MCNC: 5.8 NG/ML
T4 FREE SERPL-MCNC: 1.03 NG/DL
TSH SERPL DL<=0.005 MIU/L-ACNC: 0.11 UIU/ML

## 2018-02-19 PROCEDURE — 82627 DEHYDROEPIANDROSTERONE: CPT

## 2018-02-19 PROCEDURE — 84402 ASSAY OF FREE TESTOSTERONE: CPT

## 2018-02-19 PROCEDURE — 84439 ASSAY OF FREE THYROXINE: CPT

## 2018-02-19 PROCEDURE — 84443 ASSAY THYROID STIM HORMONE: CPT

## 2018-02-19 PROCEDURE — 82670 ASSAY OF TOTAL ESTRADIOL: CPT

## 2018-02-19 PROCEDURE — 84144 ASSAY OF PROGESTERONE: CPT

## 2018-02-19 PROCEDURE — 36415 COLL VENOUS BLD VENIPUNCTURE: CPT | Mod: PO

## 2018-02-21 ENCOUNTER — OFFICE VISIT (OUTPATIENT)
Dept: RHEUMATOLOGY | Facility: CLINIC | Age: 58
End: 2018-02-21
Payer: COMMERCIAL

## 2018-02-21 VITALS
HEART RATE: 81 BPM | SYSTOLIC BLOOD PRESSURE: 127 MMHG | BODY MASS INDEX: 27.07 KG/M2 | WEIGHT: 148 LBS | DIASTOLIC BLOOD PRESSURE: 87 MMHG

## 2018-02-21 DIAGNOSIS — M79.7 FIBROMYALGIA: Primary | ICD-10-CM

## 2018-02-21 DIAGNOSIS — M25.512 CHRONIC LEFT SHOULDER PAIN: ICD-10-CM

## 2018-02-21 DIAGNOSIS — G89.29 CHRONIC LEFT SHOULDER PAIN: ICD-10-CM

## 2018-02-21 PROCEDURE — 3008F BODY MASS INDEX DOCD: CPT | Mod: S$GLB,,, | Performed by: INTERNAL MEDICINE

## 2018-02-21 PROCEDURE — 99999 PR PBB SHADOW E&M-EST. PATIENT-LVL III: CPT | Mod: PBBFAC,,, | Performed by: INTERNAL MEDICINE

## 2018-02-21 PROCEDURE — 99214 OFFICE O/P EST MOD 30 MIN: CPT | Mod: S$GLB,,, | Performed by: INTERNAL MEDICINE

## 2018-02-21 RX ORDER — GABAPENTIN 100 MG/1
100 CAPSULE ORAL NIGHTLY
Qty: 30 CAPSULE | Refills: 3 | Status: SHIPPED | OUTPATIENT
Start: 2018-02-21 | End: 2018-07-05

## 2018-02-22 ENCOUNTER — OFFICE VISIT (OUTPATIENT)
Dept: OBSTETRICS AND GYNECOLOGY | Facility: CLINIC | Age: 58
End: 2018-02-22
Attending: OBSTETRICS & GYNECOLOGY
Payer: COMMERCIAL

## 2018-02-22 VITALS
HEIGHT: 62 IN | BODY MASS INDEX: 27.55 KG/M2 | SYSTOLIC BLOOD PRESSURE: 120 MMHG | WEIGHT: 149.69 LBS | DIASTOLIC BLOOD PRESSURE: 82 MMHG

## 2018-02-22 DIAGNOSIS — Z78.0 MENOPAUSE: Primary | ICD-10-CM

## 2018-02-22 PROCEDURE — 96372 THER/PROPH/DIAG INJ SC/IM: CPT | Mod: S$GLB,,, | Performed by: OBSTETRICS & GYNECOLOGY

## 2018-02-22 PROCEDURE — 99213 OFFICE O/P EST LOW 20 MIN: CPT | Mod: 25,S$GLB,, | Performed by: OBSTETRICS & GYNECOLOGY

## 2018-02-22 PROCEDURE — 3008F BODY MASS INDEX DOCD: CPT | Mod: S$GLB,,, | Performed by: OBSTETRICS & GYNECOLOGY

## 2018-02-22 RX ORDER — TESTOSTERONE CYPIONATE 200 MG/ML
50 INJECTION, SOLUTION INTRAMUSCULAR ONCE
Status: COMPLETED | OUTPATIENT
Start: 2018-02-22 | End: 2018-02-22

## 2018-02-22 RX ADMIN — TESTOSTERONE CYPIONATE 50 MG: 200 INJECTION, SOLUTION INTRAMUSCULAR at 12:02

## 2018-02-22 NOTE — PROGRESS NOTES
Alma Jackson received hormone therapy injection in clinic per Dr. Villegas, no complaints at this time , Injection given as ordered, tolerated well, no report of pain prior to or after injection. Return to clinic as scheduled.     Site -LB    Testosterone    50  mg  Depo Estradiol       10  mg    Clinic Supplied Medication

## 2018-02-22 NOTE — PROGRESS NOTES
Subjective:       Patient ID: Alma Jackson is a 57 y.o. female.    Chief Complaint:  Menopause      History of Present Illness  HPI  This pt is here for follow up on taking the E/P/T 2mg /300mg/5mg and no bump in either her estradiol or testoterone.  She is still just as symptomatic as before .  Progesterone worked well.  We discussed and will go to E/T injection today 10 mg and 50 mg and pt understands and agrees    GYN & OB History  No LMP recorded. Patient is postmenopausal.   Date of Last Pap: 10/30/2017    OB History    Para Term  AB Living   3 3 3         SAB TAB Ectopic Multiple Live Births                  # Outcome Date GA Lbr Arvind/2nd Weight Sex Delivery Anes PTL Lv   3 Term            2 Term            1 Term                   Review of Systems  Review of Systems   Constitutional: Positive for fatigue. Negative for chills and fever.   Respiratory: Negative for shortness of breath.    Cardiovascular: Negative for chest pain.   Gastrointestinal: Negative for abdominal pain, nausea and vomiting.   Endocrine: Positive for heat intolerance.   Genitourinary: Negative for difficulty urinating, dyspareunia, genital sores, menstrual problem, pelvic pain, vaginal bleeding, vaginal discharge and vaginal pain.   Skin: Negative for wound.   Hematological: Negative for adenopathy.   Psychiatric/Behavioral: Positive for decreased concentration and sleep disturbance.           Objective:    Physical Exam       Assessment:        1. Menopause               Plan:      See above plan and follow up in one month.

## 2018-02-23 LAB — TESTOST FREE SERPL-MCNC: 1.9 PG/ML

## 2018-02-26 NOTE — PROGRESS NOTES
History of present illness: 57-year-old female complains of diffuse aching she was eventually diagnosed as having fibromyalgia.  She had previously been evaluated by Dr. Restrepo.  She had not been seen for 3 years.  She complains of increased aching in the lower back with the cold weather.  She has some pain in the elbows and hands.  She has a history of frozen shoulder on the right side.  She went to physical therapy which helped.  It is now spread to the left arm.  She has had no joint swelling.  She has no morning stiffness.  The pain does wake her up at night.  It is there in the morning.  It does increase some with activity.    She has taken Lyrica in the past but she did not tolerate it.  Cymbalta did not help.  Advil gave her some relief.  Heat has been helping.  Topical medications did not help.    She denies any fever.  She has occasional migraine.  She has no rash, conjunctivitis, oral ulcers.  She complains of dry eye but no dry mouth.  She has no Raynaud's phenomena, pleurisy, vaginal discharge or ulcers, chronic or bloody diarrhea.  She has no family history connective tissue disease.    Systems review:  Gen.: Weight has been stable  GI: No abdominal pain or peptic ulcer disease.  No liver problems.  : No kidney or bladder problems    Physical examination:  Skin: No rashes  ENT: Decreased tears and saliva  Musculoskeletal: She is diffusely tender to palpation both articular and nonarticular areas.  She does have decreased range of motion of the left shoulder with some pain on range of motion.  She has no areas as synovitis.    Assessment:  1.  I concur with the history of fibromyalgia  2.  Frozen shoulder syndrome    Plans:  1.  I placed her on gabapentin 100 mg at bedtime.  I started her on the low dosage because of her intolerance to medications.  I told her the dose could be increased in the future.  2.  I referred her to physical therapy for her shoulder  3.  We discussed other therapeutic  modalities such as massage therapy  4.  She is to return to see me in 3 months

## 2018-03-06 ENCOUNTER — TELEPHONE (OUTPATIENT)
Dept: ENDOCRINOLOGY | Facility: CLINIC | Age: 58
End: 2018-03-06

## 2018-03-06 NOTE — TELEPHONE ENCOUNTER
----- Message from Mirella Abel sent at 3/6/2018  4:51 PM CST -----  Contact: Self [phone]  PT would like for Dr. Rodriguez to review the results from her recent labs. She can be reached at [phone].

## 2018-03-22 ENCOUNTER — CLINICAL SUPPORT (OUTPATIENT)
Dept: OBSTETRICS AND GYNECOLOGY | Facility: CLINIC | Age: 58
End: 2018-03-22
Payer: COMMERCIAL

## 2018-03-22 DIAGNOSIS — Z78.0 MENOPAUSE: Primary | ICD-10-CM

## 2018-03-22 PROCEDURE — 99999 PR PBB SHADOW E&M-EST. PATIENT-LVL II: CPT | Mod: PBBFAC,,,

## 2018-03-22 PROCEDURE — 96372 THER/PROPH/DIAG INJ SC/IM: CPT | Mod: S$GLB,,, | Performed by: OBSTETRICS & GYNECOLOGY

## 2018-03-22 RX ORDER — TESTOSTERONE CYPIONATE 200 MG/ML
50 INJECTION, SOLUTION INTRAMUSCULAR ONCE
Status: COMPLETED | OUTPATIENT
Start: 2018-03-22 | End: 2018-03-22

## 2018-03-22 RX ADMIN — TESTOSTERONE CYPIONATE 50 MG: 200 INJECTION, SOLUTION INTRAMUSCULAR at 11:03

## 2018-04-10 ENCOUNTER — OFFICE VISIT (OUTPATIENT)
Dept: OBSTETRICS AND GYNECOLOGY | Facility: CLINIC | Age: 58
End: 2018-04-10
Attending: OBSTETRICS & GYNECOLOGY
Payer: COMMERCIAL

## 2018-04-10 VITALS
BODY MASS INDEX: 28.32 KG/M2 | WEIGHT: 153.88 LBS | HEIGHT: 62 IN | DIASTOLIC BLOOD PRESSURE: 82 MMHG | SYSTOLIC BLOOD PRESSURE: 130 MMHG

## 2018-04-10 DIAGNOSIS — Z78.0 MENOPAUSE: Primary | ICD-10-CM

## 2018-04-10 PROCEDURE — 96372 THER/PROPH/DIAG INJ SC/IM: CPT | Mod: S$GLB,,, | Performed by: OBSTETRICS & GYNECOLOGY

## 2018-04-10 PROCEDURE — 99213 OFFICE O/P EST LOW 20 MIN: CPT | Mod: 25,S$GLB,, | Performed by: OBSTETRICS & GYNECOLOGY

## 2018-04-10 RX ORDER — TESTOSTERONE CYPIONATE 200 MG/ML
50 INJECTION, SOLUTION INTRAMUSCULAR
Status: DISCONTINUED | OUTPATIENT
Start: 2018-04-10 | End: 2018-07-05

## 2018-04-10 RX ADMIN — TESTOSTERONE CYPIONATE 50 MG: 200 INJECTION, SOLUTION INTRAMUSCULAR at 02:04

## 2018-04-10 NOTE — PROGRESS NOTES
"Subjective:       Patient ID: Alma Jackson is a 57 y.o. female.    Chief Complaint:  Follow-up      History of Present Illness  HPI  This 57 yr old female is here for follow up of starting on E/T Im injections and po progesterone nightly. She is so much better.  She has fibromyalgia and is better with the injections (she did not absorb the pills at all)  She had been prescribed gabapentin and did not like it and not taking this.  She does see a therapist and she has been on antidepressant in past and now is asking about Trintellix.  Her therapist suggested this.  Will look into this.  Worried about weight gain.   She has had second "frozen shoulder" in left now and was in right before.    Otherwise she is doing well.  She spotted one day last month and one day this month.  GYN & OB History  No LMP recorded. Patient is postmenopausal.   Date of Last Pap: 10/30/2017    OB History    Para Term  AB Living   3 3 3         SAB TAB Ectopic Multiple Live Births                  # Outcome Date GA Lbr Arvind/2nd Weight Sex Delivery Anes PTL Lv   3 Term            2 Term            1 Term                   Review of Systems  Review of Systems   Constitutional: Negative for chills and fever.   Respiratory: Negative for shortness of breath.    Cardiovascular: Negative for chest pain.   Gastrointestinal: Negative for abdominal pain, nausea and vomiting.   Genitourinary: Negative for difficulty urinating, dyspareunia, genital sores, menstrual problem, pelvic pain, vaginal bleeding, vaginal discharge and vaginal pain.   Musculoskeletal: Positive for arthralgias and myalgias.   Skin: Negative for wound.   Hematological: Negative for adenopathy.           Objective:    Physical Exam       Assessment:        1. Menopause               Plan:      Will continue E/T 10 mg /50mg  And if does well will let her friend who is nurse give to her in future.  May add low dose antidepressant Trintellix if ok       "

## 2018-04-10 NOTE — PROGRESS NOTES
Alma SHAWNA Jackson received hormone therapy injection in clinic per Dr. Villegas.  MD is aware last IM hormone shot was 3/22/18. Patient has no complaints at this time , Injection given as ordered, tolerated well, no report of pain prior to or after injection. Return to clinic as scheduled.     Site - [facility]    Testosterone     50 mg  Depo Estradiol      10  mg    Clinic Supplied Medication

## 2018-04-16 DIAGNOSIS — Z78.0 MENOPAUSE: Primary | ICD-10-CM

## 2018-04-17 ENCOUNTER — TELEPHONE (OUTPATIENT)
Dept: OBSTETRICS AND GYNECOLOGY | Facility: CLINIC | Age: 58
End: 2018-04-17

## 2018-04-17 NOTE — TELEPHONE ENCOUNTER
----- Message from Ruby Villegas MD sent at 4/16/2018  5:21 PM CDT -----  Please call this pt for labs 2 weeks after next injection

## 2018-05-07 ENCOUNTER — OFFICE VISIT (OUTPATIENT)
Dept: ENDOCRINOLOGY | Facility: CLINIC | Age: 58
End: 2018-05-07
Payer: COMMERCIAL

## 2018-05-07 VITALS
DIASTOLIC BLOOD PRESSURE: 80 MMHG | HEIGHT: 62 IN | WEIGHT: 149.69 LBS | SYSTOLIC BLOOD PRESSURE: 134 MMHG | HEART RATE: 78 BPM | BODY MASS INDEX: 27.55 KG/M2

## 2018-05-07 DIAGNOSIS — E05.00 GRAVES' DISEASE WITH EXOPHTHALMOS: ICD-10-CM

## 2018-05-07 DIAGNOSIS — E07.9 THYROID EYE DISEASE: Primary | ICD-10-CM

## 2018-05-07 DIAGNOSIS — H57.89 THYROID EYE DISEASE: Primary | ICD-10-CM

## 2018-05-07 PROCEDURE — 3008F BODY MASS INDEX DOCD: CPT | Mod: CPTII,S$GLB,, | Performed by: INTERNAL MEDICINE

## 2018-05-07 PROCEDURE — 99214 OFFICE O/P EST MOD 30 MIN: CPT | Mod: S$GLB,,, | Performed by: INTERNAL MEDICINE

## 2018-05-07 PROCEDURE — 99999 PR PBB SHADOW E&M-EST. PATIENT-LVL III: CPT | Mod: PBBFAC,,, | Performed by: INTERNAL MEDICINE

## 2018-05-07 NOTE — PROGRESS NOTES
"Subjective:      Patient ID: Alma Jackson is a 57 y.o. female.    Chief Complaint:  Postablative hypothyroidism      History of Present Illness  54 year old female with hypothyroidism presents for follow up of disease management. Pt was originally diagnosed with Graves' disease and is now s/p LICEA therapy. She cannot remember how long ago this was. She was originally on synthroid, but switched to [place] thyroid 120 mg daily. She states that she feels better on the armour thyroid than synthroid. She takes her medication in the am on an empty stomach. She takes it with her HCTZ. Pt is extremely frustrated as she continues to feel extreme fatigue that has become worse in the last 3 months. She also reports muscle aches in her extremies, most notably in her hips and lower extremities. She states the pain is "like a headache". She states that it is constant and 5-6/10. She reports sleeping 5 hours at night and wakes up unrefreshed because she is in pain when she sleeps. She does not notice the pain when she is at work. She has tried taking celebrex in the past with no relief. She has also tried percocet and this provides relief. She reports heat intolerance, but denies any hair loss, skin changes, change in mood. She also reports weight gain of 40 lbs over several months. She exercises frequently and maintains a healthy diet.     Review of Systems   Constitutional: Positive for fatigue and unexpected weight change (gain). Negative for activity change and appetite change.   HENT: Negative for trouble swallowing and voice change.    Respiratory: Negative for shortness of breath and wheezing.    Cardiovascular: Negative for chest pain and palpitations.   Gastrointestinal: Negative for constipation, diarrhea, nausea and vomiting.   Endocrine: Positive for heat intolerance. Negative for cold intolerance.   Musculoskeletal: Positive for arthralgias and myalgias. Negative for gait problem and joint swelling.        " Left frozen shoulder with limitation of motion.   Skin: Negative for pallor and rash.   Neurological: Negative for tremors and headaches.   Psychiatric/Behavioral: Positive for agitation. Negative for dysphoric mood. The patient is not nervous/anxious.        Objective:   Physical Exam   Constitutional: She is oriented to person, place, and time. She appears well-developed and well-nourished. No distress.   Eyes: Conjunctivae and EOM are normal. Pupils are equal, round, and reactive to light.   Neck: Normal range of motion. Neck supple. No thyromegaly present.   Cardiovascular: Normal rate, regular rhythm, normal heart sounds and intact distal pulses.  Exam reveals no gallop and no friction rub.    No murmur heard.  Pulmonary/Chest: Effort normal and breath sounds normal. No respiratory distress. She has no wheezes. She has no rales.   Abdominal: Soft. Bowel sounds are normal. She exhibits no distension and no mass. There is no tenderness.   Musculoskeletal: Normal range of motion. She exhibits tenderness (Multiple points of tenderness in knees, hips, neck, elbows; no swelling or redness). She exhibits no edema.   Neurological: She is alert and oriented to person, place, and time.   Skin: Skin is warm and dry. No rash noted. She is not diaphoretic. No erythema.   Psychiatric: She has a normal mood and affect.       Lab Review:   Lab Visit on 02/19/2018   Component Date Value    Testosterone, Free 02/19/2018 1.9     Estradiol 02/19/2018 16     Progesterone 02/19/2018 5.8     DHEA-SO4 02/19/2018 94.2     TSH 02/19/2018 0.114*    Free T4 02/19/2018 1.03        Assessment:       Plan:     54 year old female who presents for disease management.    1. Hypothyroidism with normal TSH  --Continue armour thyroid 5/7 days a week  Left frozen shoulder.      RTC in 6 months or sooner if problems arise.

## 2018-05-10 ENCOUNTER — CLINICAL SUPPORT (OUTPATIENT)
Dept: OBSTETRICS AND GYNECOLOGY | Facility: CLINIC | Age: 58
End: 2018-05-10
Payer: COMMERCIAL

## 2018-05-10 PROCEDURE — 96372 THER/PROPH/DIAG INJ SC/IM: CPT | Mod: S$GLB,,, | Performed by: OBSTETRICS & GYNECOLOGY

## 2018-05-10 PROCEDURE — 99999 PR PBB SHADOW E&M-EST. PATIENT-LVL II: CPT | Mod: PBBFAC,,,

## 2018-05-10 RX ADMIN — TESTOSTERONE CYPIONATE 50 MG: 200 INJECTION, SOLUTION INTRAMUSCULAR at 11:05

## 2018-05-10 NOTE — PROGRESS NOTES
Here for  hormone therapy injection, no complaints at this time , Injection given as ordered, tolerated well, no report of pain prior to or after injection. Return to clinic as scheduled.     Site - [facility]    Testosterone  50  mg  Depo Estradiol  10  mg    Clinic Supplied Medication

## 2018-05-25 ENCOUNTER — LAB VISIT (OUTPATIENT)
Dept: LAB | Facility: HOSPITAL | Age: 58
End: 2018-05-25
Attending: OBSTETRICS & GYNECOLOGY
Payer: COMMERCIAL

## 2018-05-25 DIAGNOSIS — E05.00 GRAVES' DISEASE WITH EXOPHTHALMOS: ICD-10-CM

## 2018-05-25 DIAGNOSIS — E07.9 THYROID EYE DISEASE: ICD-10-CM

## 2018-05-25 DIAGNOSIS — H57.89 THYROID EYE DISEASE: ICD-10-CM

## 2018-05-25 DIAGNOSIS — Z78.0 MENOPAUSE: ICD-10-CM

## 2018-05-25 LAB
ESTRADIOL SERPL-MCNC: 210 PG/ML
T4 FREE SERPL-MCNC: 0.95 NG/DL
TSH SERPL DL<=0.005 MIU/L-ACNC: 0.04 UIU/ML

## 2018-05-25 PROCEDURE — 84443 ASSAY THYROID STIM HORMONE: CPT

## 2018-05-25 PROCEDURE — 36415 COLL VENOUS BLD VENIPUNCTURE: CPT | Mod: PO

## 2018-05-25 PROCEDURE — 84402 ASSAY OF FREE TESTOSTERONE: CPT

## 2018-05-25 PROCEDURE — 82670 ASSAY OF TOTAL ESTRADIOL: CPT

## 2018-05-25 PROCEDURE — 84439 ASSAY OF FREE THYROXINE: CPT

## 2018-05-28 LAB — TESTOST FREE SERPL-MCNC: 3 PG/ML

## 2018-06-06 ENCOUNTER — CLINICAL SUPPORT (OUTPATIENT)
Dept: OBSTETRICS AND GYNECOLOGY | Facility: CLINIC | Age: 58
End: 2018-06-06
Payer: COMMERCIAL

## 2018-06-06 PROCEDURE — 99999 PR PBB SHADOW E&M-EST. PATIENT-LVL I: CPT | Mod: PBBFAC,,,

## 2018-06-06 PROCEDURE — 96372 THER/PROPH/DIAG INJ SC/IM: CPT | Mod: S$GLB,,, | Performed by: OBSTETRICS & GYNECOLOGY

## 2018-06-06 RX ADMIN — TESTOSTERONE CYPIONATE 50 MG: 200 INJECTION, SOLUTION INTRAMUSCULAR at 03:06

## 2018-06-06 NOTE — PROGRESS NOTES
Admin test 0.25cc and Depo estradiol 2cc into right buttock. Pt reports no pain but wants to know if the dosage of estradiol is remaining the same as she has reported bleeding for 10 days this month. Pt instructed to remain in clinic for 15 mins after inj. No adverse reaction noted.

## 2018-06-07 ENCOUNTER — PATIENT MESSAGE (OUTPATIENT)
Dept: ENDOCRINOLOGY | Facility: CLINIC | Age: 58
End: 2018-06-07

## 2018-06-07 DIAGNOSIS — E05.80 THYROTOXICOSIS, EXOGENOUS IATROGENIC: Primary | ICD-10-CM

## 2018-06-07 DIAGNOSIS — E89.0 POSTABLATIVE HYPOTHYROIDISM: ICD-10-CM

## 2018-06-07 DIAGNOSIS — Z78.0 MENOPAUSE: Primary | ICD-10-CM

## 2018-06-08 ENCOUNTER — TELEPHONE (OUTPATIENT)
Dept: ENDOCRINOLOGY | Facility: CLINIC | Age: 58
End: 2018-06-08

## 2018-06-08 RX ORDER — THYROID 90 MG/1
90 TABLET ORAL
Qty: 90 TABLET | Refills: 1 | Status: SHIPPED | OUTPATIENT
Start: 2018-06-08 | End: 2018-12-14 | Stop reason: SDUPTHER

## 2018-06-08 NOTE — TELEPHONE ENCOUNTER
----- Message from Felicity Cordova sent at 6/8/2018 12:55 PM CDT -----  Contact: self [phone]  .Patient Returning Call from [facility]    Who Left Message for Patient:    Communication Preference: call back   Additional Information:

## 2018-06-08 NOTE — TELEPHONE ENCOUNTER
Results for orders placed or performed in visit on 05/25/18   Estradiol   Result Value Ref Range    Estradiol 210 See Text pg/mL   Testosterone, free   Result Value Ref Range    Testosterone, Free 3.0 pg/mL   TSH   Result Value Ref Range    TSH 0.041 (L) 0.400 - 4.000 uIU/mL   T4, free   Result Value Ref Range    Free T4 0.95 0.71 - 1.51 ng/dL       Iatrogenic thyrotoxicosis:  1) [place] thyroid 90 mg daily (decrease from one tablet daily except on sat/sund 1 1/2 tablets)  2) TSH in three months.

## 2018-06-09 ENCOUNTER — PATIENT MESSAGE (OUTPATIENT)
Dept: OBSTETRICS AND GYNECOLOGY | Facility: CLINIC | Age: 58
End: 2018-06-09

## 2018-06-09 DIAGNOSIS — N39.0 URINARY TRACT INFECTION WITHOUT HEMATURIA, SITE UNSPECIFIED: Primary | ICD-10-CM

## 2018-06-12 ENCOUNTER — PATIENT MESSAGE (OUTPATIENT)
Dept: OBSTETRICS AND GYNECOLOGY | Facility: CLINIC | Age: 58
End: 2018-06-12

## 2018-06-12 RX ORDER — NITROFURANTOIN 25; 75 MG/1; MG/1
100 CAPSULE ORAL 2 TIMES DAILY
Qty: 14 CAPSULE | Refills: 0 | OUTPATIENT
Start: 2018-06-12 | End: 2018-06-19

## 2018-06-12 RX ORDER — PHENAZOPYRIDINE HYDROCHLORIDE 200 MG/1
200 TABLET, FILM COATED ORAL 3 TIMES DAILY PRN
Qty: 20 TABLET | Refills: 0 | Status: SHIPPED | OUTPATIENT
Start: 2018-06-12 | End: 2018-07-12 | Stop reason: SDUPTHER

## 2018-06-12 RX ORDER — CEPHALEXIN 500 MG/1
500 CAPSULE ORAL 4 TIMES DAILY
Qty: 40 CAPSULE | Refills: 0 | Status: SHIPPED | OUTPATIENT
Start: 2018-06-12 | End: 2018-06-22

## 2018-06-29 ENCOUNTER — PATIENT MESSAGE (OUTPATIENT)
Dept: OBSTETRICS AND GYNECOLOGY | Facility: CLINIC | Age: 58
End: 2018-06-29

## 2018-07-05 ENCOUNTER — OFFICE VISIT (OUTPATIENT)
Dept: OBSTETRICS AND GYNECOLOGY | Facility: CLINIC | Age: 58
End: 2018-07-05
Payer: COMMERCIAL

## 2018-07-05 ENCOUNTER — CLINICAL SUPPORT (OUTPATIENT)
Dept: OBSTETRICS AND GYNECOLOGY | Facility: CLINIC | Age: 58
End: 2018-07-05
Payer: COMMERCIAL

## 2018-07-05 VITALS
WEIGHT: 150.13 LBS | SYSTOLIC BLOOD PRESSURE: 124 MMHG | DIASTOLIC BLOOD PRESSURE: 80 MMHG | HEIGHT: 62 IN | BODY MASS INDEX: 27.63 KG/M2

## 2018-07-05 DIAGNOSIS — N90.89 VULVAR IRRITATION: Primary | ICD-10-CM

## 2018-07-05 DIAGNOSIS — Z79.890 HORMONE REPLACEMENT THERAPY (HRT): Primary | ICD-10-CM

## 2018-07-05 LAB
CANDIDA RRNA VAG QL PROBE: NEGATIVE
G VAGINALIS RRNA GENITAL QL PROBE: NEGATIVE
T VAGINALIS RRNA GENITAL QL PROBE: NEGATIVE

## 2018-07-05 PROCEDURE — 99213 OFFICE O/P EST LOW 20 MIN: CPT | Mod: S$GLB,,, | Performed by: NURSE PRACTITIONER

## 2018-07-05 PROCEDURE — 96372 THER/PROPH/DIAG INJ SC/IM: CPT | Mod: S$GLB,,, | Performed by: OBSTETRICS & GYNECOLOGY

## 2018-07-05 PROCEDURE — 99999 PR PBB SHADOW E&M-EST. PATIENT-LVL III: CPT | Mod: PBBFAC,,,

## 2018-07-05 PROCEDURE — 3008F BODY MASS INDEX DOCD: CPT | Mod: CPTII,S$GLB,, | Performed by: NURSE PRACTITIONER

## 2018-07-05 PROCEDURE — 87660 TRICHOMONAS VAGIN DIR PROBE: CPT

## 2018-07-05 RX ORDER — NYSTATIN AND TRIAMCINOLONE ACETONIDE 100000; 1 [USP'U]/G; MG/G
CREAM TOPICAL
Qty: 30 G | Refills: 1 | Status: SHIPPED | OUTPATIENT
Start: 2018-07-05 | End: 2018-11-19

## 2018-07-05 RX ORDER — CYCLOBENZAPRINE HCL 10 MG
TABLET ORAL
Refills: 1 | COMMUNITY
Start: 2018-05-20 | End: 2021-04-06

## 2018-07-05 RX ORDER — TRAMADOL HYDROCHLORIDE 50 MG/1
TABLET ORAL
Refills: 1 | COMMUNITY
Start: 2018-05-20 | End: 2018-11-19

## 2018-07-05 RX ORDER — DILTIAZEM HYDROCHLORIDE 30 MG/1
TABLET, FILM COATED ORAL EVERY 12 HOURS
Refills: 3 | COMMUNITY
Start: 2018-06-13

## 2018-07-05 RX ORDER — FLUCONAZOLE 200 MG/1
200 TABLET ORAL
Qty: 2 TABLET | Refills: 0 | Status: SHIPPED | OUTPATIENT
Start: 2018-07-05 | End: 2018-11-19

## 2018-07-05 RX ADMIN — TESTOSTERONE CYPIONATE 50 MG: 200 INJECTION, SOLUTION INTRAMUSCULAR at 10:07

## 2018-07-05 NOTE — PROGRESS NOTES
Alma Jackson is a 57 y.o. female  presents with complaint of a suspected yeast infection. Reports personal hx of recurrent UTIs. Recently finished a course of Keflex and is now having yeast s/s. Denies abnormal discharge or odor. Mainly irritation and itching on vulva. She used Monistat 1 four days ago with no relief. Reports spotting today since she is on a new HRT.     Past Medical History:   Diagnosis Date    Cataract     Fibromyalgia     Hx of colonic polyps     Hypertension     Hyperthyroidism     Hypothyroidism     Ophthalmic Graves disease      Past Surgical History:   Procedure Laterality Date     SECTION, CLASSIC      COLONOSCOPY N/A 2016    Procedure: COLONOSCOPY;  Surgeon: Benny Menchaca MD;  Location: [facility] ([address] [place] [address]);  Service: Endoscopy;  Laterality: N/A;  requests Dr Menchaca    EYE SURGERY      lasik ou      thyroid radiation      TUBAL LIGATION       Social History   Substance Use Topics    Smoking status: Current Every Day Smoker     Types: Vaping with nicotine     Last attempt to quit: 2011    Smokeless tobacco: Never Used    Alcohol use 0.0 oz/week      Comment: SOCIALLY     Family History   Problem Relation Age of Onset    Heart failure Mother     Thyroid disease Mother     Stroke Mother     Parkinsonism Father     Hypertension Maternal Grandmother     Stroke Maternal Grandmother     Diabetes Maternal Grandfather     Stroke Paternal Grandfather     Amblyopia Neg Hx     Blindness Neg Hx     Cancer Neg Hx     Cataracts Neg Hx     Glaucoma Neg Hx     Macular degeneration Neg Hx     Retinal detachment Neg Hx     Strabismus Neg Hx     Breast cancer Neg Hx     Colon cancer Neg Hx     Ovarian cancer Neg Hx      OB History    Para Term  AB Living   3 3 3         SAB TAB Ectopic Multiple Live Births                  # Outcome Date GA Lbr Arvind/2nd Weight Sex Delivery Anes PTL Lv   3 Term            2 Term            1  "Term                   /80   Ht 5' 2" (1.575 m)   Wt 68.1 kg (150 lb 2.1 oz)   LMP  (LMP Unknown)   BMI 27.46 kg/m²     ROS:  GENERAL: No fever, chills, fatigability or weight loss.  VULVAR: No pain, no lesions and + itching.  VAGINAL: No relaxation, no itching, no discharge, no abnormal bleeding and no lesions.  ABDOMEN: No abdominal pain. Denies nausea. Denies vomiting. No diarrhea. No constipation  BREAST: Denies pain. No lumps. No discharge.  URINARY: No incontinence, no nocturia, no frequency and no dysuria.  CARDIOVASCULAR: No chest pain. No shortness of breath. No leg cramps.  NEUROLOGICAL: No headaches. No vision changes.    PHYSICAL EXAM:  VULVA: normal appearing vulva with no masses, tenderness or lesions + irritation noted at introitus  VAGINA: normal appearing vagina with normal color. NO discharge, no lesions + menstrual spotting in vault  CERVIX: normal appearing cervix without discharge or lesions   UTERUS: uterus is normal size, shape, consistency and nontender   ADNEXA: normal adnexa in size, nontender and no masses    ASSESSMENT and PLAN:    ICD-10-CM ICD-9-CM    1. Vulvar irritation N90.89 624.8 Vaginosis Screen by DNA Probe      nystatin-triamcinolone (MYCOLOG II) cream      fluconazole (DIFLUCAN) 200 MG Tab     Affirm pending-d/w pt probable negative results and will treat today based on s/s   Rx mycolog and diflucan today    Patient was counseled today on vaginitis prevention including :  a. avoiding feminine products such as deoderant soaps, body wash, bubble bath, douches, scented toilet paper, deoderant tampons or pads, feminine wipes, chronic pad use, etc.  b. avoiding other vulvovaginal irritants such as long hot baths, humidity, tight, synthetic clothing, chlorine and sitting around in wet bathing suits  c. wearing cotton underwear, avoiding thong underwear and no underwear to bed  d. taking showers instead of baths and use a hair dryer on cool setting afterwards to dry  e. " wearing cotton to exercise and shower immediately after exercise and change clothes  f. using polyurethane condoms without spermicide if sexually active and symptoms are triggered by intercourse    FOLLOW UP: PRN lack of improvement.

## 2018-07-12 DIAGNOSIS — N39.0 URINARY TRACT INFECTION WITHOUT HEMATURIA, SITE UNSPECIFIED: ICD-10-CM

## 2018-07-17 ENCOUNTER — PATIENT MESSAGE (OUTPATIENT)
Dept: ENDOCRINOLOGY | Facility: CLINIC | Age: 58
End: 2018-07-17

## 2018-07-17 RX ORDER — PHENAZOPYRIDINE HYDROCHLORIDE 200 MG/1
TABLET, FILM COATED ORAL
Qty: 20 TABLET | Refills: 0 | Status: SHIPPED | OUTPATIENT
Start: 2018-07-17 | End: 2018-11-19

## 2018-07-20 ENCOUNTER — LAB VISIT (OUTPATIENT)
Dept: LAB | Facility: HOSPITAL | Age: 58
End: 2018-07-20
Attending: INTERNAL MEDICINE
Payer: COMMERCIAL

## 2018-07-20 DIAGNOSIS — E05.80 THYROTOXICOSIS, EXOGENOUS IATROGENIC: ICD-10-CM

## 2018-07-20 DIAGNOSIS — E89.0 POSTABLATIVE HYPOTHYROIDISM: ICD-10-CM

## 2018-07-20 LAB — TSH SERPL DL<=0.005 MIU/L-ACNC: 1.17 UIU/ML

## 2018-07-20 PROCEDURE — 36415 COLL VENOUS BLD VENIPUNCTURE: CPT | Mod: PO

## 2018-07-20 PROCEDURE — 84443 ASSAY THYROID STIM HORMONE: CPT

## 2018-08-02 ENCOUNTER — CLINICAL SUPPORT (OUTPATIENT)
Dept: OBSTETRICS AND GYNECOLOGY | Facility: CLINIC | Age: 58
End: 2018-08-02
Payer: COMMERCIAL

## 2018-08-02 DIAGNOSIS — Z79.890 HORMONE REPLACEMENT THERAPY (HRT): Primary | ICD-10-CM

## 2018-08-02 PROCEDURE — 96372 THER/PROPH/DIAG INJ SC/IM: CPT | Mod: S$GLB,,, | Performed by: OBSTETRICS & GYNECOLOGY

## 2018-08-02 PROCEDURE — 99999 PR PBB SHADOW E&M-EST. PATIENT-LVL II: CPT | Mod: PBBFAC,,,

## 2018-08-02 RX ORDER — TESTOSTERONE CYPIONATE 200 MG/ML
50 INJECTION, SOLUTION INTRAMUSCULAR
Status: COMPLETED | OUTPATIENT
Start: 2018-08-02 | End: 2018-09-27

## 2018-08-02 RX ADMIN — TESTOSTERONE CYPIONATE 50 MG: 200 INJECTION, SOLUTION INTRAMUSCULAR at 11:08

## 2018-08-02 NOTE — PROGRESS NOTES
Here for  hormone therapy injection, no complaints at this time , Injection given as ordered, tolerated well, no report of pain prior to or after injection. Return to clinic as scheduled.     Site - RB    Testosterone  50 mg  Depo Estradiol  10  mg    Clinic Supplied Medication

## 2018-08-23 ENCOUNTER — PATIENT MESSAGE (OUTPATIENT)
Dept: OBSTETRICS AND GYNECOLOGY | Facility: CLINIC | Age: 58
End: 2018-08-23

## 2018-08-29 NOTE — TELEPHONE ENCOUNTER
Spoke with this pt and will decrease the estradiol and continue the prometrium 300 from Jennifer and she undestands and agrees.  She feels much better and does not desire to stop but has had bleeding with last 3 shots.  If no better will need ultrasound for lininng of uterus and probable EMBx

## 2018-08-30 ENCOUNTER — CLINICAL SUPPORT (OUTPATIENT)
Dept: OBSTETRICS AND GYNECOLOGY | Facility: CLINIC | Age: 58
End: 2018-08-30
Payer: COMMERCIAL

## 2018-08-30 PROCEDURE — 96372 THER/PROPH/DIAG INJ SC/IM: CPT | Mod: S$GLB,,, | Performed by: OBSTETRICS & GYNECOLOGY

## 2018-08-30 PROCEDURE — 99999 PR PBB SHADOW E&M-EST. PATIENT-LVL II: CPT | Mod: PBBFAC,,,

## 2018-08-30 RX ADMIN — TESTOSTERONE CYPIONATE 50 MG: 200 INJECTION, SOLUTION INTRAMUSCULAR at 11:08

## 2018-08-30 NOTE — PROGRESS NOTES
Here for  hormone therapy injection, no complaints at this time , Injection given as ordered, tolerated well, no report of pain prior to or after injection. Return to clinic as scheduled.     Site - LB    Testosterone  50  mg  Depo Estradiol  5  mg    Clinic Supplied Medication

## 2018-09-27 ENCOUNTER — CLINICAL SUPPORT (OUTPATIENT)
Dept: OBSTETRICS AND GYNECOLOGY | Facility: CLINIC | Age: 58
End: 2018-09-27
Payer: COMMERCIAL

## 2018-09-27 PROCEDURE — 96372 THER/PROPH/DIAG INJ SC/IM: CPT | Mod: S$GLB,,, | Performed by: OBSTETRICS & GYNECOLOGY

## 2018-09-27 PROCEDURE — 99999 PR PBB SHADOW E&M-EST. PATIENT-LVL II: CPT | Mod: PBBFAC,,,

## 2018-09-27 RX ADMIN — TESTOSTERONE CYPIONATE 50 MG: 200 INJECTION, SOLUTION INTRAMUSCULAR at 11:09

## 2018-09-27 NOTE — PROGRESS NOTES
Here for  hormone therapy injection, no complaints at this time , Injection given as ordered, tolerated well, no report of pain prior to or after injection. Return to clinic as scheduled.     Site - RB    Testosterone  50 mg  Depo Estradiol  5  mg    Clinic Supplied Medication

## 2018-10-25 ENCOUNTER — CLINICAL SUPPORT (OUTPATIENT)
Dept: OBSTETRICS AND GYNECOLOGY | Facility: CLINIC | Age: 58
End: 2018-10-25
Payer: COMMERCIAL

## 2018-10-25 DIAGNOSIS — Z79.890 HORMONE REPLACEMENT THERAPY (HRT): Primary | ICD-10-CM

## 2018-10-25 PROCEDURE — 96372 THER/PROPH/DIAG INJ SC/IM: CPT | Mod: S$GLB,,, | Performed by: OBSTETRICS & GYNECOLOGY

## 2018-10-25 PROCEDURE — 99999 PR PBB SHADOW E&M-EST. PATIENT-LVL I: CPT | Mod: PBBFAC,,,

## 2018-10-25 RX ORDER — TESTOSTERONE CYPIONATE 200 MG/ML
50 INJECTION, SOLUTION INTRAMUSCULAR
Status: COMPLETED | OUTPATIENT
Start: 2018-10-25 | End: 2019-01-16

## 2018-10-25 RX ADMIN — TESTOSTERONE CYPIONATE 50 MG: 200 INJECTION, SOLUTION INTRAMUSCULAR at 11:10

## 2018-10-31 ENCOUNTER — OFFICE VISIT (OUTPATIENT)
Dept: OBSTETRICS AND GYNECOLOGY | Facility: CLINIC | Age: 58
End: 2018-10-31
Payer: COMMERCIAL

## 2018-10-31 VITALS
SYSTOLIC BLOOD PRESSURE: 141 MMHG | WEIGHT: 151.25 LBS | HEIGHT: 62 IN | DIASTOLIC BLOOD PRESSURE: 84 MMHG | BODY MASS INDEX: 27.83 KG/M2

## 2018-10-31 DIAGNOSIS — N95.0 POST-MENOPAUSAL BLEEDING: ICD-10-CM

## 2018-10-31 DIAGNOSIS — Z01.419 WELL WOMAN EXAM WITH ROUTINE GYNECOLOGICAL EXAM: Primary | ICD-10-CM

## 2018-10-31 DIAGNOSIS — Z01.419 PAP TEST, AS PART OF ROUTINE GYNECOLOGICAL EXAMINATION: ICD-10-CM

## 2018-10-31 DIAGNOSIS — N39.3 STRESS INCONTINENCE: ICD-10-CM

## 2018-10-31 PROCEDURE — 99396 PREV VISIT EST AGE 40-64: CPT | Mod: 25,S$GLB,, | Performed by: OBSTETRICS & GYNECOLOGY

## 2018-10-31 PROCEDURE — 88305 TISSUE EXAM BY PATHOLOGIST: CPT | Mod: 26,,, | Performed by: PATHOLOGY

## 2018-10-31 PROCEDURE — 58100 BIOPSY OF UTERUS LINING: CPT | Mod: S$GLB,,, | Performed by: OBSTETRICS & GYNECOLOGY

## 2018-10-31 PROCEDURE — 99999 PR PBB SHADOW E&M-EST. PATIENT-LVL IV: CPT | Mod: PBBFAC,,, | Performed by: OBSTETRICS & GYNECOLOGY

## 2018-10-31 PROCEDURE — 88305 TISSUE EXAM BY PATHOLOGIST: CPT | Performed by: PATHOLOGY

## 2018-10-31 PROCEDURE — 88175 CYTOPATH C/V AUTO FLUID REDO: CPT

## 2018-10-31 NOTE — PROGRESS NOTES
Subjective:       Patient ID: Alma Jackson is a 58 y.o. female.    Chief Complaint:  Well Woman      History of Present Illness  HPI  This 58 yr old female is here for routine exam. She is getting injections and bleeds still every two weeks for two weeks.  She desires a hyst.  She is having stress incont and incomplete defecation.  (can feel something left inside )  She had ultrasound done two yrs ago with a normal stripe and simple 3 cm right ovarian cyst.no pain associated with this..  She does not cramp or bleed with this.  We discussed and offered D&C after Embx or hyst and she desires hyst. (depending on EMBx results)  She is taking the prometrium 400 nightly and still having this bleeding. We talked about changing her ERT and discussed pellets. She does not desire to stop her HRT.  She has met her deductable.    GYN & OB History  No LMP recorded (lmp unknown). Patient is postmenopausal.   Date of Last Pap: 10/30/2017    OB History    Para Term  AB Living   3 3 3         SAB TAB Ectopic Multiple Live Births                  # Outcome Date GA Lbr Arvind/2nd Weight Sex Delivery Anes PTL Lv   3 Term            2 Term            1 Term                   Review of Systems  Review of Systems   Constitutional: Negative for chills and fever.   Respiratory: Negative for shortness of breath.    Cardiovascular: Negative for chest pain.   Gastrointestinal: Negative for abdominal pain, nausea and vomiting.   Genitourinary: Negative for difficulty urinating, dyspareunia, genital sores, menstrual problem, pelvic pain, vaginal bleeding, vaginal discharge and vaginal pain.   Skin: Negative for wound.   Hematological: Negative for adenopathy.           Objective:   Physical Exam:   Constitutional: She is oriented to person, place, and time. She appears well-developed and well-nourished.    HENT:   Head: Normocephalic.    Eyes: EOM are normal.    Neck: Normal range of motion.    Cardiovascular: Normal rate.      Pulmonary/Chest: Effort normal. She exhibits no mass and no tenderness. Right breast exhibits no inverted nipple, no mass, no skin change and no tenderness. Left breast exhibits no inverted nipple, no mass, no skin change and no tenderness.        Abdominal: Soft. She exhibits no distension. There is no tenderness.     Genitourinary: Vagina normal and uterus normal. There is no rash, tenderness or lesion on the right labia. There is no rash, tenderness or lesion on the left labia. Uterus is not tender. Cervix is normal. Right adnexum displays no mass, no tenderness and no fullness. Left adnexum displays no mass, no tenderness and no fullness. Cervix exhibits no discharge.           Musculoskeletal: Normal range of motion.       Neurological: She is alert and oriented to person, place, and time.    Skin: Skin is warm and dry.    Psychiatric: She has a normal mood and affect.     EMBx    After verbal informed consent was done, with the patient in dorsolithotomy position, speculum placed and single toothed tenaculum placed on anterior cervix,  Pt cervix had been wiped with betadine.  pipelle easily passed past the cervix to 8 cm into intrauterine space and with two passes copious amt of tissue obtained.  This was sent to path for eval  Pt tolerated well.     Assessment:        1. Well woman exam with routine gynecological exam    2. Post-menopausal bleeding               [facility]      [facility]  Follow up after for continued ERT management.  She may continue ERT

## 2018-11-06 ENCOUNTER — PATIENT MESSAGE (OUTPATIENT)
Dept: OBSTETRICS AND GYNECOLOGY | Facility: CLINIC | Age: 58
End: 2018-11-06

## 2018-11-08 ENCOUNTER — LAB VISIT (OUTPATIENT)
Dept: LAB | Facility: HOSPITAL | Age: 58
End: 2018-11-08
Attending: OBSTETRICS & GYNECOLOGY
Payer: COMMERCIAL

## 2018-11-08 DIAGNOSIS — Z79.890 HORMONE REPLACEMENT THERAPY (HRT): ICD-10-CM

## 2018-11-08 LAB
ESTRADIOL SERPL-MCNC: 66 PG/ML
PROGEST SERPL-MCNC: 8.2 NG/ML

## 2018-11-08 PROCEDURE — 84144 ASSAY OF PROGESTERONE: CPT

## 2018-11-08 PROCEDURE — 36415 COLL VENOUS BLD VENIPUNCTURE: CPT | Mod: PO

## 2018-11-08 PROCEDURE — 82670 ASSAY OF TOTAL ESTRADIOL: CPT

## 2018-11-08 PROCEDURE — 84402 ASSAY OF FREE TESTOSTERONE: CPT

## 2018-11-12 ENCOUNTER — TELEPHONE (OUTPATIENT)
Dept: UROGYNECOLOGY | Facility: CLINIC | Age: 58
End: 2018-11-12

## 2018-11-12 ENCOUNTER — PATIENT MESSAGE (OUTPATIENT)
Dept: UROGYNECOLOGY | Facility: CLINIC | Age: 58
End: 2018-11-12

## 2018-11-12 ENCOUNTER — PATIENT MESSAGE (OUTPATIENT)
Dept: OBSTETRICS AND GYNECOLOGY | Facility: CLINIC | Age: 58
End: 2018-11-12

## 2018-11-12 NOTE — TELEPHONE ENCOUNTER
----- Message from Nicole Hodgson sent at 11/12/2018  8:52 AM CST -----  Contact: self  Pt would like a call back at [phone]. She wants to schedule her Hysterectomy on December 10 the first one in please also she is sending a message by [employer] too.

## 2018-11-12 NOTE — TELEPHONE ENCOUNTER
----- Message from Nicole Hodgson sent at 11/12/2018  8:52 AM CST -----  Contact: self  Pt would like a call back at [phone]. She wants to schedule her Hysterectomy on December 10 the first one in please also she is sending a message by [employer] too.

## 2018-11-13 ENCOUNTER — PATIENT MESSAGE (OUTPATIENT)
Dept: OBSTETRICS AND GYNECOLOGY | Facility: CLINIC | Age: 58
End: 2018-11-13

## 2018-11-13 LAB — TESTOST FREE SERPL-MCNC: 2.2 PG/ML

## 2018-11-14 ENCOUNTER — PATIENT MESSAGE (OUTPATIENT)
Dept: UROGYNECOLOGY | Facility: CLINIC | Age: 58
End: 2018-11-14

## 2018-11-19 ENCOUNTER — INITIAL CONSULT (OUTPATIENT)
Dept: UROGYNECOLOGY | Facility: CLINIC | Age: 58
End: 2018-11-19
Payer: COMMERCIAL

## 2018-11-19 VITALS — WEIGHT: 154.56 LBS | BODY MASS INDEX: 28.44 KG/M2 | HEIGHT: 62 IN

## 2018-11-19 DIAGNOSIS — Z01.818 PREOP TESTING: ICD-10-CM

## 2018-11-19 DIAGNOSIS — N93.9 ABNORMAL UTERINE BLEEDING: ICD-10-CM

## 2018-11-19 DIAGNOSIS — N39.46 MIXED STRESS AND URGE URINARY INCONTINENCE: ICD-10-CM

## 2018-11-19 DIAGNOSIS — R39.15 URINARY URGENCY: Primary | ICD-10-CM

## 2018-11-19 DIAGNOSIS — N81.11 MIDLINE CYSTOCELE: ICD-10-CM

## 2018-11-19 DIAGNOSIS — N39.46 MIXED INCONTINENCE URGE AND STRESS: ICD-10-CM

## 2018-11-19 DIAGNOSIS — N81.6 RECTOCELE: ICD-10-CM

## 2018-11-19 DIAGNOSIS — Q64.32 CONGENITAL URETHRAL STENOSIS: ICD-10-CM

## 2018-11-19 PROCEDURE — 87088 URINE BACTERIA CULTURE: CPT

## 2018-11-19 PROCEDURE — 87186 SC STD MICRODIL/AGAR DIL: CPT | Mod: 59

## 2018-11-19 PROCEDURE — 3008F BODY MASS INDEX DOCD: CPT | Mod: CPTII,S$GLB,, | Performed by: NURSE PRACTITIONER

## 2018-11-19 PROCEDURE — 99999 PR PBB SHADOW E&M-EST. PATIENT-LVL V: CPT | Mod: PBBFAC,,, | Performed by: NURSE PRACTITIONER

## 2018-11-19 PROCEDURE — 99215 OFFICE O/P EST HI 40 MIN: CPT | Mod: S$GLB,,, | Performed by: NURSE PRACTITIONER

## 2018-11-19 PROCEDURE — 87077 CULTURE AEROBIC IDENTIFY: CPT

## 2018-11-19 PROCEDURE — 87086 URINE CULTURE/COLONY COUNT: CPT

## 2018-11-19 RX ORDER — FLUOCINOLONE ACETONIDE 0.11 MG/ML
OIL AURICULAR (OTIC)
Refills: 2 | COMMUNITY
Start: 2018-08-20

## 2018-11-19 NOTE — H&P (VIEW-ONLY)
[facility] [address] [place] [address]    Alma Jackson  [id number]  1960  November 25, 2018    Consulting Physician: Ruby Villegas MD   GYN: Ruby Chang M.D.: Hira Jara MD    Chief Complaint   Patient presents with    Consult     urge to go / rectal pressure at times / not emptying enough urine at times       HPI:     1)  UI:  (+) ADDISON  (+) UUI   (+) pads:1/day, usually minimum wetness..  Daytime frequency: Q 2 hours.  Nocturia: Yes: 1-2/night.   (--) dysuria,  (--) hematuria,  (--) frequent UTIs.  (--) complete bladder emptying. Does not feel like she empties her bladder    2)  POP:  Present. above introitus.  Symptoms:(--)    (+) vaginal bleeding. (--) vaginal discharge. (+) sexually active.  (--) dyspareunia.   (+)  Vaginal dryness.  (--) vaginal estrogen use. HRT wt Dr. Villegas.  Does have a cyst on the ovary.    3)  BM:  (+) constipation/straining.  (--) chronic diarrhea. (--) hematochezia.  (+) fecal incontinence.  (+) fecal smearing/urgency.  (--) complete evacuation.  Does splint approximately 4 times a week.      Urogyn HPI Questionnaire 11/19/2018   Dysuria (painful urination) No   Nocturia:  waking up at night to empty your bladder  Yes   If you answered yes to the previous question, how many times does this happen per night? 1-2   Enuresis (urine loss during sleep) No   Dribbling urine after you urinate No   Hematuria (urine appears red) No   Type of stream Weak   Past consultation for incontinence: Have you ever seen someone for the evaluation of incontinence? No   If you answered yes to the previous question, please select all the therapies you have tried.  Kegals   Need to wear protection to keep clothes dry  Yes   If you answered yes to the previous question, please hernan the protection you use.  Poise   If you wear protection, how much wetness is typically on each pad? Slight   Leakage of urine with cough,  "laugh and/or sneeze Yes   Leakage of urine with sex No   Leakage of urine with bending/ lifting Yes   Leakage of urine with briskly walking or jogging No   Urgency ("got to go" feeling) Yes   Urge: How frequently do you feel an urge to urinate (feeling like you "gotta go" to the bathroom and can't wait) Daily   Do you experience a leakage of urine when you have a feeling of urgency?  Yes   Leakage of urine when unaware No   Past use of anticholinergics (medications used to treat overactive bladder) No   Have you ever used Mirbetriq (aka Mirabegron)?  No   Falling out/ Bulging/ Heaviness in the vagina No   Vaginal/ Abdominal Pain/ Pressure Yes   Need to strain/ Push to void Yes   Need to wait on the toilet before you void Yes   Unusual position to urinate (using your hands to push back the vaginal bulge) No   Sensation of incomplete emptying Yes   Past use of pessary device No   Constipation Yes   Diarrhea  Yes   Hematochezia (bloody stool) No   Incomplete evacuation of stool Yes   Involuntary loss of formed stool Yes   Fecal smearing/urgency Yes   Involuntary loss of gas Yes   Abnormal vaginal bleeding  Yes   Vaginal dryness No   Sexually active  Yes   Dyspareunia (painful intercourse) No   Estrogen use  Yes     08/2016  Pelvic ultrasound  The uterus is normal in size measuring 7.3 x 4.5 x 6.2 cm and the endometrial stripe is uniform in thickness measuring 3 mm.  The uterus is heterogeneous in echotexture and a partially calcified ill-defined fibroid is identified in anterior margin of the uterine body measuring approximately 2.3 x 1.6 x 2.1 cm.  Other leiomyoma may be present although are not well-visualized in this exam.  The right ovary measures 3.0 x 2.8 x 3.0 cm with normal blood flow.  A simple appearing right ovarian cyst is identified measuring 3.0 x 2.5 x 2.6 cm.  The left ovary measures 1.8 x 1.0 x 2.2 cm with normal blood flow.  There is no free pelvic fluid.  Impression:  Simple right ovarian cyst. "  Given that the patient is postmenopausal, a followup Ultrasound in 1 year is recommended based on size criteria.  Uterine leiomyoma.    Past Medical History  Past Medical History:   Diagnosis Date    Cataract     Fibromyalgia     Hx of colonic polyps     Hypertension     Hyperthyroidism     Hypothyroidism     Irregular heart beat     Ophthalmic Graves disease         Past Surgical History  Past Surgical History:   Procedure Laterality Date    CANTHOPLASTY Bilateral 2012    Performed by [facility] at [facility] 1ST FLR     SECTION, CLASSIC      COLONOSCOPY N/A 2016    Procedure: COLONOSCOPY;  Surgeon: Benny Menchaca MD;  Location: [facility] (4TH FLR);  Service: Endoscopy;  Laterality: N/A;  requests Dr Menchaca    COLONOSCOPY N/A 2016    Performed by Benny [facility]chaca MD at [facility] (4TH FLR)    DECOMPRESSION, ORBIT Bilateral 2012    Performed by [facility] at [facility] OR 1ST FLR    EYE SURGERY      lasik ou      REPAIR, RETRACTION, EYELID Bilateral 2012    Performed by [facility] at [facility] OR 1ST FLR    thyroid radiation      TUBAL LIGATION          Hysterectomy: No      Past Ob History     x 1.  C/s x 1.    Largest infant weight: 7# 11oz  yes FAVD. yes episiotomy.      Gynecologic History  LMP: No LMP recorded (lmp unknown). Patient is postmenopausal.  Age of menarche: 14  Age of menopause: 45  Menstrual history: post menopausal 2 years ago  Pap test:10/2018 normal-- embx SMALL FRAGMENTS OF INACTIVE ENDOMETRIUM AND BENIGN ENDOCERVICAL TISSUE.  History of abnormal paps: No.  History of STIs:  No  Mammogram: Date of last: 2018.  Result: Normal  Colonoscopy: Date of last: 2016 polyp, diverticulosis    DEXA: n/a    Family History  Family History   Problem Relation Age of Onset    Heart failure Mother     Thyroid disease Mother     Stroke Mother     Parkinsonism Father     Hypertension Maternal Grandmother     Stroke  Maternal Grandmother     Diabetes Maternal Grandfather     Stroke Paternal Grandfather     Amblyopia Neg Hx     Blindness Neg Hx     Cancer Neg Hx     Cataracts Neg Hx     Glaucoma Neg Hx     Macular degeneration Neg Hx     Retinal detachment Neg Hx     Strabismus Neg Hx     Breast cancer Neg Hx     Colon cancer Neg Hx     Ovarian cancer Neg Hx         Social History  Social History     Tobacco Use   Smoking Status Current Every Day Smoker    Types: Vaping with nicotine    Last attempt to quit: 2011    Years since quittin.4   Smokeless Tobacco Never Used       Quit cigarettes 7 years ago, now vapes  Social History     Substance and Sexual Activity   Alcohol Use Yes    Alcohol/week: 0.0 oz    Comment: SOCIALLY   .    Social History     Substance and Sexual Activity   Drug Use No   .  The patient is co-habitating.  Resides in [place] [address].  Employment status: currently employed  for [employer]    Allergies  Review of patient's allergies indicates:   Allergen Reactions    Azithromycin (bulk) Rash     z-pack       Medications  Current Outpatient Medications on File Prior to Visit   Medication Sig Dispense Refill    baclofen (LIORESAL) 10 MG tablet Take 10 mg by mouth as needed.  1    clonazePAM (KLONOPIN) 0.5 MG tablet 1/2 tab  ONCE A DAY as needed  3    cyclobenzaprine (FLEXERIL) 10 MG tablet TK 1 T PO QHS  1    diltiaZEM (CARDIZEM) 30 MG tablet every 12 (twelve) hours.   3    diphth,pertus,acell,,tetanus (BOOSTRIX) 2.5-8-5 Lf-mcg-Lf/0.5mL Syrg injection Inject into the muscle. 0.5 mL 0    fluocinolone acetonide oil 0.01 % Drop GAL AA BID  2    hydrochlorothiazide (HYDRODIURIL) 25 MG tablet Take 1 tablet (25 mg total) by mouth once daily. (Patient taking differently: Take 25 mg by mouth as needed. ) 90 tablet 3    hydrocodone-acetaminophen 5-325mg (NORCO) 5-325 mg per tablet TK 1 T PO  QHS PRF SEVERE NIGHT TIME PRN ACUTE PAIN  0    thyroid, pork, (ARMOUR THYROID) 90 mg  "Tab Take 1 tablet (90 mg total) by mouth before breakfast. 90 tablet 1     Current Facility-Administered Medications on File Prior to Visit   Medication Dose Route Frequency Provider Last Rate Last Dose    estradiol cypionate 5 mg/mL injection 5 mg  5 mg Intramuscular Q28 Days Ruby Villegas MD   5 mg at 11/21/18 0820    testosterone cypionate injection 50 mg  50 mg Intramuscular Q28 Days Ruby Villegas MD   50 mg at 11/21/18 0820       Review of Systems A 14 point ROS was reviewed with pertinent positives as noted above in the history of present illness.      Constitutional: negative  Eyes: negative  Endocrine: negative  Gastrointestinal: negative  Cardiovascular: negative  Respiratory: negative  Allergic/Immunologic: negative  Integumentary: negative  Psychiatric: negative  Musculoskeletal: negative   Ear/Nose/Throat: negative  Neurologic: negative  Genitourinary: SEE HPI  Hematologic/Lymphatic: negative   Breast: negative    Urogynecologic Exam  Ht 5' 2" (1.575 m)   Wt 70.1 kg (154 lb 8.7 oz)   LMP  (LMP Unknown)   BMI 28.27 kg/m²     GENERAL APPEARANCE:  The patient is well-developed, well-nourished.   Neck:  Supple with no thyromegaly, no carotid bruits.  Heart:  Regular rate and rhythm, no murmurs, rubs or gallops.  Lungs:  Clear.  No CVA tenderness.  Abdomen:  Soft, nontender, nondistended, no hepatosplenomegaly.  Incisions:  none    PELVIC:    External genitalia:  Normal Bartholins, Skenes and labia bilaterally.    Urethra:  No caruncle, diverticulum or masses.  (--) hypermobility.  Almost stenotic  Vagina:  Atrophy (+) , no bladder masses or tender, no discharge. +TTP in Iliococcygeus 2 cm from hymenal ring and puborectalis 4-5/10 on R and 5-6/10 on L  Cervix:  normal appearance  Uterus: 8 week size uterus, normal shape, mobile, nontender  Adnexa: Not palpable.    POP-Q:  Aa -2; Ba -2; C -6; Ap 0; Bp 0; D -7.  Genital hiatus 3, perineal body 2 total vaginal length 9.  Deferred.  No obvious " POP present with valsalva.     NEUROLOGIC:  Cranial nerves 2 through 12 intact.  Strength 5/5.  DTRs 2+ lower extremities.  S2 through 4 normal.  Sacral reflexes intact.    EXT: CABRERA, 2+ pulses bilaterally, no C/C/E    COUGH STRESS TEST:  negative  KEGEL: 1 /5    RECTAL:    External:  Normal, (--) hemorrhoids, (--) dovetailing.     Bladder scan 70 ml    Dr. Ko present during exam    Impression    1. Urinary urgency    2. Mixed stress and urge urinary incontinence    3. Abnormal uterine bleeding    4. Midline cystocele    5. Rectocele    6. Mixed incontinence urge and stress    7. Preop testing    8. Congenital urethral stenosis        Initial Plan  The patient was counseled regarding these issues. The patient was given a summary sheet containing each of these issues with possible options for evaluation and management. When appropriate, we also reviewed computer-generated diagrams specific to their diagnoses..  All questions were addressed to the patient's satisfaction.     1)  Mixed urinary incontinence, urge > stress:    --Empty bladder every 3 hours.  Empty well: wait a minute, lean forward on toilet.    --Avoid dietary irritants (see sheet).  Keep diary x 3-5 days to determine your irritants.  --KEGELS: do 10 in AM and 10 in PM, holding each x 10 seconds.  When you feel urge to go, STOP, KEGEL, and when urge has passed, then go to bathroom.  Consider PT in future.    --URGE: consider anticholinergic. Takes 2-4 weeks to see if will have effect.  For dry mouth: get sour, sugar free lozenge or gum.    --STRESS:  Pessary vs. Sling.   --urine culture today  --suds/ cysto scheduled    2)abnormal uterine bleeding/ cystocele stage 1, rectocele stage 2  --laparoscopic hysterectomy,bilateral salpingectomy/oopherectomy, possible anterior repair,  posterior repair, possible retropubic midurethral sling, and cystosocpy    3)pelvic pain  --will need pelvic floor PT after surgery    4)Constipation/defacatory  dysfunction  --add stool softener  --may need to consider pelvic floor PT  --adequate hydration    5)urethral stenosis  --will evaluate at cysto    6)RTC for suds/ cysto    Approximately 45 min were spent in consult, 90 % in discussion.     Thank you for requesting consultation of your patient.  I look forward to participating in their care.    Archana Iyer  Female Pelvic Medicine and Reconstructive Surgery  [facility] [place], [place]

## 2018-11-19 NOTE — PROGRESS NOTES
[facility] [address] [place] [address]    Alma Jackson  [id number]  1960  November 25, 2018    Consulting Physician: Ruby Villegas MD   GYN: Ruby Chang M.D.: Hira Jara MD    Chief Complaint   Patient presents with    Consult     urge to go / rectal pressure at times / not emptying enough urine at times       HPI:     1)  UI:  (+) ADDISON  (+) UUI   (+) pads:1/day, usually minimum wetness..  Daytime frequency: Q 2 hours.  Nocturia: Yes: 1-2/night.   (--) dysuria,  (--) hematuria,  (--) frequent UTIs.  (--) complete bladder emptying. Does not feel like she empties her bladder    2)  POP:  Present. above introitus.  Symptoms:(--)    (+) vaginal bleeding. (--) vaginal discharge. (+) sexually active.  (--) dyspareunia.   (+)  Vaginal dryness.  (--) vaginal estrogen use. HRT wt Dr. Villegas.  Does have a cyst on the ovary.    3)  BM:  (+) constipation/straining.  (--) chronic diarrhea. (--) hematochezia.  (+) fecal incontinence.  (+) fecal smearing/urgency.  (--) complete evacuation.  Does splint approximately 4 times a week.      Urogyn HPI Questionnaire 11/19/2018   Dysuria (painful urination) No   Nocturia:  waking up at night to empty your bladder  Yes   If you answered yes to the previous question, how many times does this happen per night? 1-2   Enuresis (urine loss during sleep) No   Dribbling urine after you urinate No   Hematuria (urine appears red) No   Type of stream Weak   Past consultation for incontinence: Have you ever seen someone for the evaluation of incontinence? No   If you answered yes to the previous question, please select all the therapies you have tried.  Kegals   Need to wear protection to keep clothes dry  Yes   If you answered yes to the previous question, please hernan the protection you use.  Poise   If you wear protection, how much wetness is typically on each pad? Slight   Leakage of urine with cough,  "laugh and/or sneeze Yes   Leakage of urine with sex No   Leakage of urine with bending/ lifting Yes   Leakage of urine with briskly walking or jogging No   Urgency ("got to go" feeling) Yes   Urge: How frequently do you feel an urge to urinate (feeling like you "gotta go" to the bathroom and can't wait) Daily   Do you experience a leakage of urine when you have a feeling of urgency?  Yes   Leakage of urine when unaware No   Past use of anticholinergics (medications used to treat overactive bladder) No   Have you ever used Mirbetriq (aka Mirabegron)?  No   Falling out/ Bulging/ Heaviness in the vagina No   Vaginal/ Abdominal Pain/ Pressure Yes   Need to strain/ Push to void Yes   Need to wait on the toilet before you void Yes   Unusual position to urinate (using your hands to push back the vaginal bulge) No   Sensation of incomplete emptying Yes   Past use of pessary device No   Constipation Yes   Diarrhea  Yes   Hematochezia (bloody stool) No   Incomplete evacuation of stool Yes   Involuntary loss of formed stool Yes   Fecal smearing/urgency Yes   Involuntary loss of gas Yes   Abnormal vaginal bleeding  Yes   Vaginal dryness No   Sexually active  Yes   Dyspareunia (painful intercourse) No   Estrogen use  Yes     08/2016  Pelvic ultrasound  The uterus is normal in size measuring 7.3 x 4.5 x 6.2 cm and the endometrial stripe is uniform in thickness measuring 3 mm.  The uterus is heterogeneous in echotexture and a partially calcified ill-defined fibroid is identified in anterior margin of the uterine body measuring approximately 2.3 x 1.6 x 2.1 cm.  Other leiomyoma may be present although are not well-visualized in this exam.  The right ovary measures 3.0 x 2.8 x 3.0 cm with normal blood flow.  A simple appearing right ovarian cyst is identified measuring 3.0 x 2.5 x 2.6 cm.  The left ovary measures 1.8 x 1.0 x 2.2 cm with normal blood flow.  There is no free pelvic fluid.  Impression:  Simple right ovarian cyst. "  Given that the patient is postmenopausal, a followup Ultrasound in 1 year is recommended based on size criteria.  Uterine leiomyoma.    Past Medical History  Past Medical History:   Diagnosis Date    Cataract     Fibromyalgia     Hx of colonic polyps     Hypertension     Hyperthyroidism     Hypothyroidism     Irregular heart beat     Ophthalmic Graves disease         Past Surgical History  Past Surgical History:   Procedure Laterality Date    CANTHOPLASTY Bilateral 2012    Performed [facility] at [facility] 1ST FLR     SECTION, CLASSIC      COLONOSCOPY N/A 2016    Procedure: COLONOSCOPY;  Surgeon: Benny Menchaca MD;  Location: [facility] (4TH FLR);  Service: Endoscopy;  Laterality: N/A;  requests Dr Menchaca    COLONOSCOPY N/A 2016    Performed by Benny Menchaca MD at [facility] (4TH FLR)    DECOMPRESSION, ORBIT Bilateral 2012    Performed by Rodo Carreon MD at [facility] OR 1ST FLR    EYE SURGERY      lasik ou      REPAIR, RETRACTION, EYELID Bilateral 2012    Performed by [facility]reon MD at [facility] OR 1ST FLR    thyroid radiation      TUBAL LIGATION          Hysterectomy: No      Past Ob History     x 1.  C/s x 1.    Largest infant weight: 7# 11oz  yes FAVD. yes episiotomy.      Gynecologic History  LMP: No LMP recorded (lmp unknown). Patient is postmenopausal.  Age of menarche: 14  Age of menopause: 45  Menstrual history: post menopausal 2 years ago  Pap test:10/2018 normal-- embx SMALL FRAGMENTS OF INACTIVE ENDOMETRIUM AND BENIGN ENDOCERVICAL TISSUE.  History of abnormal paps: No.  History of STIs:  No  Mammogram: Date of last: 2018.  Result: Normal  Colonoscopy: Date of last: 2016 polyp, diverticulosis    DEXA: n/a    Family History  Family History   Problem Relation Age of Onset    Heart failure Mother     Thyroid disease Mother     Stroke Mother     Parkinsonism Father     Hypertension Maternal Grandmother     Stroke  Maternal Grandmother     Diabetes Maternal Grandfather     Stroke Paternal Grandfather     Amblyopia Neg Hx     Blindness Neg Hx     Cancer Neg Hx     Cataracts Neg Hx     Glaucoma Neg Hx     Macular degeneration Neg Hx     Retinal detachment Neg Hx     Strabismus Neg Hx     Breast cancer Neg Hx     Colon cancer Neg Hx     Ovarian cancer Neg Hx         Social History  Social History     Tobacco Use   Smoking Status Current Every Day Smoker    Types: Vaping with nicotine    Last attempt to quit: 2011    Years since quittin.4   Smokeless Tobacco Never Used       Quit cigarettes 7 years ago, now vapes  Social History     Substance and Sexual Activity   Alcohol Use Yes    Alcohol/week: 0.0 oz    Comment: SOCIALLY   .    Social History     Substance and Sexual Activity   Drug Use No   .  The patient is co-habitating.  Resides in [place] [address].  Employment status: currently employed  for [employer]    Allergies  Review of patient's allergies indicates:   Allergen Reactions    Azithromycin (bulk) Rash     z-pack       Medications  Current Outpatient Medications on File Prior to Visit   Medication Sig Dispense Refill    baclofen (LIORESAL) 10 MG tablet Take 10 mg by mouth as needed.  1    clonazePAM (KLONOPIN) 0.5 MG tablet 1/2 tab  ONCE A DAY as needed  3    cyclobenzaprine (FLEXERIL) 10 MG tablet TK 1 T PO QHS  1    diltiaZEM (CARDIZEM) 30 MG tablet every 12 (twelve) hours.   3    diphth,pertus,acell,,tetanus (BOOSTRIX) 2.5-8-5 Lf-mcg-Lf/0.5mL Syrg injection Inject into the muscle. 0.5 mL 0    fluocinolone acetonide oil 0.01 % Drop GAL AA BID  2    hydrochlorothiazide (HYDRODIURIL) 25 MG tablet Take 1 tablet (25 mg total) by mouth once daily. (Patient taking differently: Take 25 mg by mouth as needed. ) 90 tablet 3    hydrocodone-acetaminophen 5-325mg (NORCO) 5-325 mg per tablet TK 1 T PO  QHS PRF SEVERE NIGHT TIME PRN ACUTE PAIN  0    thyroid, pork, (ARMOUR THYROID) 90 mg  "Tab Take 1 tablet (90 mg total) by mouth before breakfast. 90 tablet 1     Current Facility-Administered Medications on File Prior to Visit   Medication Dose Route Frequency Provider Last Rate Last Dose    estradiol cypionate 5 mg/mL injection 5 mg  5 mg Intramuscular Q28 Days Ruby Villegas MD   5 mg at 11/21/18 0820    testosterone cypionate injection 50 mg  50 mg Intramuscular Q28 Days Ruby Villegas MD   50 mg at 11/21/18 0820       Review of Systems A 14 point ROS was reviewed with pertinent positives as noted above in the history of present illness.      Constitutional: negative  Eyes: negative  Endocrine: negative  Gastrointestinal: negative  Cardiovascular: negative  Respiratory: negative  Allergic/Immunologic: negative  Integumentary: negative  Psychiatric: negative  Musculoskeletal: negative   Ear/Nose/Throat: negative  Neurologic: negative  Genitourinary: SEE HPI  Hematologic/Lymphatic: negative   Breast: negative    Urogynecologic Exam  Ht 5' 2" (1.575 m)   Wt 70.1 kg (154 lb 8.7 oz)   LMP  (LMP Unknown)   BMI 28.27 kg/m²     GENERAL APPEARANCE:  The patient is well-developed, well-nourished.   Neck:  Supple with no thyromegaly, no carotid bruits.  Heart:  Regular rate and rhythm, no murmurs, rubs or gallops.  Lungs:  Clear.  No CVA tenderness.  Abdomen:  Soft, nontender, nondistended, no hepatosplenomegaly.  Incisions:  none    PELVIC:    External genitalia:  Normal Bartholins, Skenes and labia bilaterally.    Urethra:  No caruncle, diverticulum or masses.  (--) hypermobility.  Almost stenotic  Vagina:  Atrophy (+) , no bladder masses or tender, no discharge. +TTP in Iliococcygeus 2 cm from hymenal ring and puborectalis 4-5/10 on R and 5-6/10 on L  Cervix:  normal appearance  Uterus: 8 week size uterus, normal shape, mobile, nontender  Adnexa: Not palpable.    POP-Q:  Aa -2; Ba -2; C -6; Ap 0; Bp 0; D -7.  Genital hiatus 3, perineal body 2 total vaginal length 9.  Deferred.  No obvious " POP present with valsalva.     NEUROLOGIC:  Cranial nerves 2 through 12 intact.  Strength 5/5.  DTRs 2+ lower extremities.  S2 through 4 normal.  Sacral reflexes intact.    EXT: CABRERA, 2+ pulses bilaterally, no C/C/E    COUGH STRESS TEST:  negative  KEGEL: 1 /5    RECTAL:    External:  Normal, (--) hemorrhoids, (--) dovetailing.     Bladder scan 70 ml    Dr. Ko present during exam    Impression    1. Urinary urgency    2. Mixed stress and urge urinary incontinence    3. Abnormal uterine bleeding    4. Midline cystocele    5. Rectocele    6. Mixed incontinence urge and stress    7. Preop testing    8. Congenital urethral stenosis        Initial Plan  The patient was counseled regarding these issues. The patient was given a summary sheet containing each of these issues with possible options for evaluation and management. When appropriate, we also reviewed computer-generated diagrams specific to their diagnoses..  All questions were addressed to the patient's satisfaction.     1)  Mixed urinary incontinence, urge > stress:    --Empty bladder every 3 hours.  Empty well: wait a minute, lean forward on toilet.    --Avoid dietary irritants (see sheet).  Keep diary x 3-5 days to determine your irritants.  --KEGELS: do 10 in AM and 10 in PM, holding each x 10 seconds.  When you feel urge to go, STOP, KEGEL, and when urge has passed, then go to bathroom.  Consider PT in future.    --URGE: consider anticholinergic. Takes 2-4 weeks to see if will have effect.  For dry mouth: get sour, sugar free lozenge or gum.    --STRESS:  Pessary vs. Sling.   --urine culture today  --suds/ cysto scheduled    2)abnormal uterine bleeding/ cystocele stage 1, rectocele stage 2  --laparoscopic hysterectomy,bilateral salpingectomy/oopherectomy, possible anterior repair,  posterior repair, possible retropubic midurethral sling, and cystosocpy    3)pelvic pain  --will need pelvic floor PT after surgery    4)Constipation/defacatory  dysfunction  --add stool softener  --may need to consider pelvic floor PT  --adequate hydration    5)urethral stenosis  --will evaluate at cysto    6)RTC for suds/ cysto    Approximately 45 min were spent in consult, 90 % in discussion.     Thank you for requesting consultation of your patient.  I look forward to participating in their care.    Archana Iyer  Female Pelvic Medicine and Reconstructive Surgery  [facility] [place], [place]

## 2018-11-19 NOTE — LETTER
November 20, 2018      Ruby Villegas MD  [address]  [facility] [address]           [facility]  [address] [place] [address]  Phone: [phone]          Patient: Alma Jackson   MR Number: [id number]   YOB: 1960   Date of Visit: 11/19/2018       Dear Dr. Ruby Villegas:    Thank you for referring Alma Jackson to me for evaluation. Attached you will find relevant portions of my assessment and plan of care.    If you have questions, please do not hesitate to call me. I look forward to following Alma Jackson along with you.    Sincerely,    Archana Iyer, YUNI    Enclosure  CC:  No Recipients    If you would like to receive this communication electronically, please contact [email or web address] or [phone] to request more information on [facility] access.    For providers and/or their staff who would like to refer a patient to [facility], please contact us through our one-stop-shop provider referral line, [facility] , at [phone].    If you feel you have received this communication in error or would no longer like to receive these types of communications, please e-mail [email or web address]

## 2018-11-19 NOTE — PATIENT INSTRUCTIONS
1)  Mixed urinary incontinence, urge > stress:    --Empty bladder every 3 hours.  Empty well: wait a minute, lean forward on toilet.    --Avoid dietary irritants (see sheet).  Keep diary x 3-5 days to determine your irritants.  --KEGELS: do 10 in AM and 10 in PM, holding each x 10 seconds.  When you feel urge to go, STOP, KEGEL, and when urge has passed, then go to bathroom.  Consider PT in future.    --URGE: consider anticholinergic. Takes 2-4 weeks to see if will have effect.  For dry mouth: get sour, sugar free lozenge or gum.    --STRESS:  Pessary vs. Sling.   --urine culture today  --suds/ cysto scheduled    2)abnormal uterine bleeding/ cystocele stage 1, rectocele stage 2  --laparoscopic hysterectomy,bilateral salpingectomy/oopherectomy, possible anterior repair,  posterior repair, possible retropubic midurethral sling, and cystosocpy    3)pelvic pain  --will need pelvic floor PT after surgery    4)Constipation/defacatory dysfunction  --add stool softener  --may need to consider pelvic floor PT  --adequate hydration    5)urethral stenosis  --will evaluate at cysto    6)RTC for suds/ cysto

## 2018-11-20 ENCOUNTER — PROCEDURE VISIT (OUTPATIENT)
Dept: UROGYNECOLOGY | Facility: CLINIC | Age: 58
End: 2018-11-20
Payer: COMMERCIAL

## 2018-11-20 ENCOUNTER — ANESTHESIA EVENT (OUTPATIENT)
Dept: SURGERY | Facility: OTHER | Age: 58
End: 2018-11-20
Payer: COMMERCIAL

## 2018-11-20 ENCOUNTER — HOSPITAL ENCOUNTER (OUTPATIENT)
Dept: PREADMISSION TESTING | Facility: OTHER | Age: 58
Discharge: HOME | End: 2018-11-20
Attending: OBSTETRICS & GYNECOLOGY
Payer: COMMERCIAL

## 2018-11-20 VITALS
SYSTOLIC BLOOD PRESSURE: 130 MMHG | DIASTOLIC BLOOD PRESSURE: 80 MMHG | BODY MASS INDEX: 28.16 KG/M2 | HEIGHT: 62 IN | WEIGHT: 153 LBS

## 2018-11-20 VITALS
HEART RATE: 82 BPM | OXYGEN SATURATION: 98 % | DIASTOLIC BLOOD PRESSURE: 80 MMHG | HEIGHT: 62 IN | BODY MASS INDEX: 28.34 KG/M2 | SYSTOLIC BLOOD PRESSURE: 130 MMHG | WEIGHT: 154 LBS

## 2018-11-20 DIAGNOSIS — N39.46 MIXED STRESS AND URGE URINARY INCONTINENCE: ICD-10-CM

## 2018-11-20 DIAGNOSIS — E05.00 GRAVES' DISEASE WITH EXOPHTHALMOS: ICD-10-CM

## 2018-11-20 DIAGNOSIS — M79.7 FIBROMYALGIA: ICD-10-CM

## 2018-11-20 DIAGNOSIS — E55.9 VITAMIN D DEFICIENCY: ICD-10-CM

## 2018-11-20 DIAGNOSIS — Z00.00 ROUTINE MEDICAL EXAM: Primary | ICD-10-CM

## 2018-11-20 DIAGNOSIS — I10 HYPERTENSION, ESSENTIAL: ICD-10-CM

## 2018-11-20 DIAGNOSIS — Z01.818 PREOP TESTING: ICD-10-CM

## 2018-11-20 DIAGNOSIS — R39.15 URINARY URGENCY: ICD-10-CM

## 2018-11-20 DIAGNOSIS — E03.9 HYPOTHYROIDISM, UNSPECIFIED TYPE: ICD-10-CM

## 2018-11-20 DIAGNOSIS — M85.50: ICD-10-CM

## 2018-11-20 LAB
ALBUMIN SERPL BCP-MCNC: 3.9 G/DL
ALP SERPL-CCNC: 41 U/L
ALT SERPL W/O P-5'-P-CCNC: 13 U/L
ANION GAP SERPL CALC-SCNC: 6 MMOL/L
AST SERPL-CCNC: 16 U/L
BASOPHILS # BLD AUTO: 0.03 K/UL
BASOPHILS NFR BLD: 0.4 %
BILIRUB SERPL-MCNC: 0.6 MG/DL
BILIRUB SERPL-MCNC: NORMAL MG/DL
BLOOD URINE, POC: NORMAL
BUN SERPL-MCNC: 15 MG/DL
CALCIUM SERPL-MCNC: 9.7 MG/DL
CHLORIDE SERPL-SCNC: 106 MMOL/L
CHOLEST SERPL-MCNC: 214 MG/DL
CHOLEST/HDLC SERPL: 4.4 {RATIO}
CO2 SERPL-SCNC: 28 MMOL/L
COLOR, POC UA: NORMAL
CREAT SERPL-MCNC: 0.7 MG/DL
DIFFERENTIAL METHOD: NORMAL
EOSINOPHIL # BLD AUTO: 0.1 K/UL
EOSINOPHIL NFR BLD: 0.8 %
ERYTHROCYTE [DISTWIDTH] IN BLOOD BY AUTOMATED COUNT: 12.2 %
EST. GFR  (AFRICAN AMERICAN): >60 ML/MIN/1.73 M^2
EST. GFR  (NON AFRICAN AMERICAN): >60 ML/MIN/1.73 M^2
GLUCOSE SERPL-MCNC: 84 MG/DL
GLUCOSE UR QL STRIP: NORMAL
HCT VFR BLD AUTO: 42.9 %
HDLC SERPL-MCNC: 49 MG/DL
HDLC SERPL: 22.9 %
HGB BLD-MCNC: 14 G/DL
KETONES UR QL STRIP: NORMAL
LDLC SERPL CALC-MCNC: 130 MG/DL
LEUKOCYTE ESTERASE URINE, POC: NORMAL
LYMPHOCYTES # BLD AUTO: 3.1 K/UL
LYMPHOCYTES NFR BLD: 38.5 %
MCH RBC QN AUTO: 30.6 PG
MCHC RBC AUTO-ENTMCNC: 32.6 G/DL
MCV RBC AUTO: 94 FL
MONOCYTES # BLD AUTO: 0.4 K/UL
MONOCYTES NFR BLD: 5 %
NEUTROPHILS # BLD AUTO: 4.4 K/UL
NEUTROPHILS NFR BLD: 55.2 %
NITRITE, POC UA: NORMAL
NONHDLC SERPL-MCNC: 165 MG/DL
PH, POC UA: 5
PLATELET # BLD AUTO: 267 K/UL
PMV BLD AUTO: 9.5 FL
POTASSIUM SERPL-SCNC: 4.1 MMOL/L
PROT SERPL-MCNC: 6.9 G/DL
PROTEIN, POC: NORMAL
RBC # BLD AUTO: 4.57 M/UL
SODIUM SERPL-SCNC: 140 MMOL/L
SPECIFIC GRAVITY, POC UA: 1.01
T4 FREE SERPL-MCNC: 0.82 NG/DL
TRIGL SERPL-MCNC: 175 MG/DL
TSH SERPL DL<=0.005 MIU/L-ACNC: 0.29 UIU/ML
UROBILINOGEN, POC UA: NORMAL
WBC # BLD AUTO: 7.97 K/UL

## 2018-11-20 PROCEDURE — 84443 ASSAY THYROID STIM HORMONE: CPT

## 2018-11-20 PROCEDURE — 84402 ASSAY OF FREE TESTOSTERONE: CPT

## 2018-11-20 PROCEDURE — 51741 ELECTRO-UROFLOWMETRY FIRST: CPT | Mod: 51,S$GLB,, | Performed by: OBSTETRICS & GYNECOLOGY

## 2018-11-20 PROCEDURE — 81002 URINALYSIS NONAUTO W/O SCOPE: CPT | Mod: S$GLB,,, | Performed by: OBSTETRICS & GYNECOLOGY

## 2018-11-20 PROCEDURE — 80053 COMPREHEN METABOLIC PANEL: CPT

## 2018-11-20 PROCEDURE — 84270 ASSAY OF SEX HORMONE GLOBUL: CPT

## 2018-11-20 PROCEDURE — 93005 ELECTROCARDIOGRAM TRACING: CPT

## 2018-11-20 PROCEDURE — 85025 COMPLETE CBC W/AUTO DIFF WBC: CPT

## 2018-11-20 PROCEDURE — 80061 LIPID PANEL: CPT

## 2018-11-20 PROCEDURE — 51797 INTRAABDOMINAL PRESSURE TEST: CPT | Mod: S$GLB,,, | Performed by: OBSTETRICS & GYNECOLOGY

## 2018-11-20 PROCEDURE — 82040 ASSAY OF SERUM ALBUMIN: CPT

## 2018-11-20 PROCEDURE — 86803 HEPATITIS C AB TEST: CPT

## 2018-11-20 PROCEDURE — 36415 COLL VENOUS BLD VENIPUNCTURE: CPT

## 2018-11-20 PROCEDURE — 51728 CYSTOMETROGRAM W/VP: CPT | Mod: S$GLB,,, | Performed by: OBSTETRICS & GYNECOLOGY

## 2018-11-20 PROCEDURE — 84439 ASSAY OF FREE THYROXINE: CPT

## 2018-11-20 PROCEDURE — 52000 CYSTOURETHROSCOPY: CPT | Mod: 59,S$GLB,, | Performed by: OBSTETRICS & GYNECOLOGY

## 2018-11-20 PROCEDURE — 93010 ELECTROCARDIOGRAM REPORT: CPT | Mod: ,,, | Performed by: INTERNAL MEDICINE

## 2018-11-20 PROCEDURE — 51784 ANAL/URINARY MUSCLE STUDY: CPT | Mod: 51,S$GLB,, | Performed by: OBSTETRICS & GYNECOLOGY

## 2018-11-20 RX ORDER — MIDAZOLAM HYDROCHLORIDE 5 MG/ML
5 INJECTION INTRAMUSCULAR; INTRAVENOUS ONCE AS NEEDED
Status: CANCELLED | OUTPATIENT
Start: 2018-11-20 | End: 2018-11-20

## 2018-11-20 RX ORDER — OXYCODONE HYDROCHLORIDE 5 MG/1
5 TABLET ORAL ONCE AS NEEDED
Status: CANCELLED | OUTPATIENT
Start: 2018-11-20 | End: 2018-11-20

## 2018-11-20 RX ORDER — LIDOCAINE HYDROCHLORIDE 10 MG/ML
0.5 INJECTION, SOLUTION EPIDURAL; INFILTRATION; INTRACAUDAL; PERINEURAL ONCE
Status: CANCELLED | OUTPATIENT
Start: 2018-11-20 | End: 2018-11-20

## 2018-11-20 RX ORDER — FAMOTIDINE 20 MG/1
20 TABLET, FILM COATED ORAL
Status: CANCELLED | OUTPATIENT
Start: 2018-11-20 | End: 2018-11-20

## 2018-11-20 RX ORDER — SODIUM CHLORIDE, SODIUM LACTATE, POTASSIUM CHLORIDE, CALCIUM CHLORIDE 600; 310; 30; 20 MG/100ML; MG/100ML; MG/100ML; MG/100ML
INJECTION, SOLUTION INTRAVENOUS CONTINUOUS
Status: CANCELLED | OUTPATIENT
Start: 2018-11-20

## 2018-11-20 NOTE — ANESTHESIA PREPROCEDURE EVALUATION
11/20/2018  Alma Jackson is a 58 y.o., female.    Anesthesia Evaluation    I have reviewed the Patient Summary Reports.    I have reviewed the Nursing Notes.   I have reviewed the Medications.     Review of Systems  Anesthesia Hx:  No problems with previous Anesthesia    Social:  Smoker, Social Alcohol Use    Hematology/Oncology:  Hematology Normal   Oncology Normal     EENT/Dental:EENT/Dental Normal   Cardiovascular:   Exercise tolerance: good Hypertension, well controlled Dysrhythmias    Pulmonary:  Pulmonary Normal    Renal/:  Renal/ Normal     Hepatic/GI:  Hepatic/GI Normal    Musculoskeletal:  Musculoskeletal Normal    Neurological:   Neuromuscular Disease,    Endocrine:   Hypothyroidism Hyperthyroidism Graves Dz.   Dermatological:  Skin Normal    Psych:  Psychiatric Normal           Physical Exam  General:  Well nourished    Airway/Jaw/Neck:  Airway Findings: Mouth Opening: Normal Tongue: Normal  General Airway Assessment: Adult, Good  Mallampati: I  TM Distance: Normal, at least 6 cm  Jaw/Neck Findings:  Neck ROM: Normal ROM      Dental:  Dental Findings: In tact, molar caps        Mental Status:  Mental Status Findings:  Cooperative, Alert and Oriented         Anesthesia Plan  Type of Anesthesia, risks & benefits discussed:  Anesthesia Type:  general  Patient's Preference:   Intra-op Monitoring Plan: standard ASA monitors  Intra-op Monitoring Plan Comments:   Post Op Pain Control Plan: per primary service following discharge from PACU  Post Op Pain Control Plan Comments:   Induction:    Beta Blocker:         Informed Consent: Patient understands risks and agrees with Anesthesia plan.  Questions answered.   ASA Score: 2     Day of Surgery Review of History & Physical:    H&P update referred to the surgeon.     Anesthesia Plan Notes: Clearance pending from Cardiologist. Labs pending. Pt  has a h/o abnormal heart rhythm.         Ready For Surgery From Anesthesia Perspective.

## 2018-11-20 NOTE — DISCHARGE INSTRUCTIONS
PRE-ADMIT TESTING -  [phone]    [address]  [place] [facility]          Your surgery has been scheduled at [facility]. We are pleased to have the opportunity to serve you. For Further Information please call [phone].    On the day of surgery please report to the Information Desk on the 1st floor.    · CONTACT YOUR PHYSICIAN'S OFFICE THE DAY PRIOR TO YOUR SURGERY TO OBTAIN YOUR ARRIVAL TIME.     · The evening before surgery do not eat anything after 9 p.m. ( this includes hard candy, chewing gum and mints).  You may only have GATORADE, POWERADE AND WATER  from 9 p.m. until you leave your home.   DO NOT DRINK ANY LIQUIDS ON THE WAY TO THE HOSPITAL.      SPECIAL MEDICATION INSTRUCTIONS: TAKE medications checked off by the Anesthesiologist on your Medication List.    Angiogram Patients: Take medications as instructed by your physician, including aspirin.     Surgery Patients:    If you take ASPIRIN - Your PHYSICIAN/SURGEON will need to inform you IF/OR when you need to stop taking aspirin prior to your surgery.     Do Not take any medications containing IBUPROFEN.  Do Not Wear any make-up or dark nail polish   (especially eye make-up) to surgery. If you come to surgery with makeup on you will be required to remove the makeup or nail polish.    Do not shave your surgical area at least 5 days prior to your surgery. The surgical prep will be performed at the hospital according to Infection Control regulations.    Leave all valuables at home.   Do Not wear any jewelry or watches, including any metal in body piercings.  Contact Lens must be removed before surgery. Either do not wear the contact lens or bring a case and solution for storage.  Please bring a container for eyeglasses or dentures as required.  Bring any paperwork your physician has provided, such as consent forms,  history and physicals, doctor's orders, etc.   Bring comfortable clothes that are loose fitting to wear upon  discharge. Take into consideration the type of surgery being performed.  Maintain your diet as advised per your physician the day prior to surgery.      Adequate rest the night before surgery is advised.   Park in the Parking lot behind the hospital or in the [facility] across the street from the parking lot. Parking is complimentary.  If you will be discharged the same day as your procedure, please arrange for a responsible adult to drive you home or to accompany you if traveling by taxi.   YOU WILL NOT BE PERMITTED TO DRIVE OR TO LEAVE THE HOSPITAL ALONE AFTER SURGERY.   It is strongly recommended that you arrange for someone to remain with you for the first 24 hrs following your surgery.       Thank you for your cooperation.  The Staff of [facility].        Bathing Instructions                                                                 Please shower the evening before and morning of your procedure with    ANTIBACTERIAL SOAP. ( DIAL, etc )  Concentrate on the surgical area   for at least 3 minutes and rinse completely. Dry off as usual.   Do not use any deodorant, powder, body lotions, perfume, after shave or    cologne.

## 2018-11-21 ENCOUNTER — CLINICAL SUPPORT (OUTPATIENT)
Dept: OBSTETRICS AND GYNECOLOGY | Facility: CLINIC | Age: 58
End: 2018-11-21
Payer: COMMERCIAL

## 2018-11-21 LAB — HCV AB SERPL QL IA: NEGATIVE

## 2018-11-21 PROCEDURE — 99999 PR PBB SHADOW E&M-EST. PATIENT-LVL II: CPT | Mod: PBBFAC,,,

## 2018-11-21 PROCEDURE — 96372 THER/PROPH/DIAG INJ SC/IM: CPT | Mod: S$GLB,,, | Performed by: OBSTETRICS & GYNECOLOGY

## 2018-11-21 RX ADMIN — TESTOSTERONE CYPIONATE 50 MG: 200 INJECTION, SOLUTION INTRAMUSCULAR at 08:11

## 2018-11-23 LAB
BACTERIA UR CULT: NORMAL
BACTERIA UR CULT: NORMAL
TESTOST FREE SERPL-MCNC: 1.1 PG/ML

## 2018-11-24 ENCOUNTER — PATIENT MESSAGE (OUTPATIENT)
Dept: SURGERY | Facility: OTHER | Age: 58
End: 2018-11-24

## 2018-11-24 LAB
ALBUMIN SERPL-MCNC: 4.3 G/DL (ref 3.6–5.1)
SHBG SERPL-SCNC: 46 NMOL/L (ref 14–73)
TESTOST FREE SERPL-MCNC: 5.5 PG/ML (ref 0.2–5)
TESTOST SERPL-MCNC: 61 NG/DL (ref 2–45)
TESTOSTERONE.FREE+WB SERPL-MCNC: 10.9 NG/DL (ref 0.5–8.5)

## 2018-11-25 ENCOUNTER — PATIENT MESSAGE (OUTPATIENT)
Dept: SURGERY | Facility: OTHER | Age: 58
End: 2018-11-25

## 2018-11-26 ENCOUNTER — TELEPHONE (OUTPATIENT)
Dept: UROGYNECOLOGY | Facility: CLINIC | Age: 58
End: 2018-11-26

## 2018-11-26 DIAGNOSIS — R36.9 URETHRAL DISCHARGE: ICD-10-CM

## 2018-11-26 NOTE — PROCEDURES
TITLE OF OPERATION:  1. Complex cystometry.  2. Complex uroflowmetry.  3. Electromyography with surface electrodes.  4. Pressure voiding flow study.  5. Abdominal pressure measurement.  6. Leak point pressure measurement.    INDICATIONS:  1)  UI:  (+) ADDISON  (+) UUI   (+) pads:1/day, usually minimum wetness..  Daytime frequency: Q 2 hours.  Nocturia: Yes: 1-2/night.   (--) dysuria,  (--) hematuria,  (--) frequent UTIs.  (--) complete bladder emptying. Does not feel like she empties her bladder     2)  POP:  Present. above introitus.  Symptoms:(--)    (+) vaginal bleeding. (--) vaginal discharge. (+) sexually active.  (--) dyspareunia.   (+)  Vaginal dryness.  (--) vaginal estrogen use. HRT [facility] Dr. Villegas.  Does have a cyst on the ovary.     3)  BM:  (+) constipation/straining.  (--) chronic diarrhea. (--) hematochezia.  (+) fecal incontinence.  (+) fecal smearing/urgency.  (--) complete evacuation.  Does splint approximately 4 times a week.    PREOPERATIVE DIAGNOSIS:  1. Mixed incontinence   2. Urinary urgency      POSTOPERATIVE DIAGNOSIS:  1. Mixed incontinence   2. Urinary urgency    ANESTHESIA:  None.    SPECIMEN (BACTERIOLOGICAL, PATHOLOGICAL OR OTHER):  None.    PROSTHETIC DEVICE/IMPLANT:  None.    SURGEONS NARRATIVE:  A time out was performed in which the patient identity and procedure were confirmed.  Urodynamic evaluation was performed using a computerized system ([employer]  Uroflowmetry was performed on the patient in the sitting position without catheters in place.  Subsequent urodynamic testing was performed with the patient in the lithotomy position at 45 degrees. Air charged catheters were used with sterile water as the infusion medium. Vesical and abdominal (rectal) pressures were measured, and detrusor pressure was calculated. EMG activity was recorded with surface electrodes. During filling, room temperature sterile water was infused at a rate of 30 cubic centimeters per minute. The  patient was asked cough after instillation of each 100cc volume. Two Valsalva leak point pressures and two cough leak point pressures were performed with the catheters in place at 300 cubic centimeters and again at maximum capacity. Valsalva leak point pressure was defined as the difference between vesical pressure at which leakage was noted (visualized at the external urethral meatus) and the baseline vesical pressure. Following urodynamic testing, a pressure flow study was performed with the patient in the sitting position. Vesical and abdominal pressures were monitored and detrusor pressures were calculated. After the pressure flow study, the catheters were then removed. The patient tolerated the procedure well.     Urine dipstick: .    1.  VOIDING PHASE:      a.  Uroflowmetry:   Prolapse reduction: No   Voided volume:   mL    Voiding time:    seconds   Max flow:   mL/s   Avg flow:    mL/s    PVR:    mL    The overall configuration of this uroflow study was  .      b.  Pressure flow:   Prolapse reduction: No   Voided volume:    mL   Voiding time:    seconds   Peak flow:   mL/s    Avg flow:   mL/s   Max det pressure:    cm H20   Det pressure at max flow:  cm H20   Void initiated by  .     Urethral relaxation (EMG):  .     PVR (calculated):   mL    The overall configuration of this pressure flow study was .      2.  FILLING PHASE:   1st desire:  mL   Normal desire:   mL   Strong desire:   mL   Urgency:   mL   Compliance (calculated)   mL/cm H20   EMG activity during filling:      Detrusor contractions observed: No.      3.  URETHRAL FUNCTION/STORAGE PHASE:    a.  WITHOUT prolapse reduction:   CLPP (300 mL): Positive  at   cm H20   VLPP (300 mL): Positive  at   cm H20    CLPP (MAX ):    Positive  at   cm H20   VLPP (MAX):     Positive  at   cm H20  b.  WITH prolapse reduction:   CLPP (MAX ):  Positive  at   cm H20   VLPP (MAX):    Positive  at   cm H20    These findings are consistent  with Positive urodynamic stress incontinence.    Assessment:  UF  incontinence.  PF  No vault prolapse .  Compliance  .  Max capacity  .  DO (--).  ADDISON (+).      Plan:    Date: 11/25/2018    Title of Operation:   Cystourethroscopy.     INDICATIONS:  1)  UI:  (+) ADDISON  (+) UUI   (+) pads:1/day, usually minimum wetness..  Daytime frequency: Q 2 hours.  Nocturia: Yes: 1-2/night.   (--) dysuria,  (--) hematuria,  (--) frequent UTIs.  (--) complete bladder emptying. Does not feel like she empties her bladder     2)  POP:  Present. above introitus.  Symptoms:(--)    (+) vaginal bleeding. (--) vaginal discharge. (+) sexually active.  (--) dyspareunia.   (+)  Vaginal dryness.  (--) vaginal estrogen use. HRT [facility] Dr. Villegas.  Does have a cyst on the ovary.     3)  BM:  (+) constipation/straining.  (--) chronic diarrhea. (--) hematochezia.  (+) fecal incontinence.  (+) fecal smearing/urgency.  (--) complete evacuation.  Does splint approximately 4 times a week.    PREOPERATIVE DIAGNOSIS  1. Mixed incontinence   2. Urinary urgency    POSTOPERATIVE DIAGNOSIS:   1. Mixed incontinence   2. Urinary urgency    Anesthesia:   2% Xylocaine gel.    Specimen (Bacteriological, Pathological or other):   None.     Prosthetic Device/Implant:   None.     Surgeons Narrative:     After informed consent was obtained, the patient was placed in the lithotomy position. The urethral meatus was prepped with Betadine and 10 cubic centimeters of 2% Xylocaine gel were introduced into the urethra. A flexible cystourethroscope was introduced into the bladder. The bladder was distended with approximately 300 cubic centimeters of sterile water. A systematic survey was performed in which the bladder was surveyed using multiple sequential passes in a clockwise fashion from the bladder dome to the bladder base to the urethrovesical junction. The trigone and ureteral orifices were observed. The scope was then flipped back on itself, and the urethrovesical  junction was viewed. A vaginal examining finger was then placed with pressure suburethrally at the urethrovesical junction as the telescope was withdrawn in order to perform positive pressure urethroscopy.  Standard maneuvers of cough, squeeze and Valsalva were performed. The telescope was then completely withdrawn.     Findings: Urethroscopy:  Normal.  Cystoscopy:  Normal bladder mucosa, bilateral ureteral flow was noted.      Assessment: Essentially normal cystourethroscopy.      Plan: The patient will follow up with Dr. Ko  as scheduled.  See urodynamics note for further plan details.

## 2018-11-26 NOTE — TELEPHONE ENCOUNTER
Called pt and scheduled MRI of pelvis at main campus where pt requested verbally read preparation to pt she voiced understanding and call was ended.

## 2018-11-27 ENCOUNTER — TELEPHONE (OUTPATIENT)
Dept: UROGYNECOLOGY | Facility: CLINIC | Age: 58
End: 2018-11-27

## 2018-11-27 ENCOUNTER — PATIENT MESSAGE (OUTPATIENT)
Dept: SURGERY | Facility: OTHER | Age: 58
End: 2018-11-27

## 2018-11-27 ENCOUNTER — HOSPITAL ENCOUNTER (OUTPATIENT)
Dept: RADIOLOGY | Facility: HOSPITAL | Age: 58
Discharge: HOME | End: 2018-11-27
Attending: OBSTETRICS & GYNECOLOGY
Payer: COMMERCIAL

## 2018-11-27 DIAGNOSIS — R36.9 URETHRAL DISCHARGE: ICD-10-CM

## 2018-11-27 DIAGNOSIS — N30.00 ACUTE CYSTITIS WITHOUT HEMATURIA: Primary | ICD-10-CM

## 2018-11-27 PROCEDURE — 72195 MRI PELVIS W/O DYE: CPT | Mod: 26,,, | Performed by: RADIOLOGY

## 2018-11-27 PROCEDURE — 72195 MRI PELVIS W/O DYE: CPT | Mod: TC

## 2018-11-27 RX ORDER — NITROFURANTOIN 25; 75 MG/1; MG/1
100 CAPSULE ORAL 2 TIMES DAILY
Qty: 14 CAPSULE | Refills: 0 | Status: SHIPPED | OUTPATIENT
Start: 2018-11-27 | End: 2018-12-20 | Stop reason: ALTCHOICE

## 2018-11-27 NOTE — TELEPHONE ENCOUNTER
Spoke to Shanita at Dr. Vital's office, she has confirmed that the pt has dropped off a letter regarding clearance for surgery for Dr. Vital's review. Shanita states she will give the document to Dr. Vital on tomorrow 11/28. Shanita also states that the pt can not be scheduled with another provider regarding surgical clearance, and also states that the pt's EKG was not received. EKG faxed to ATTN Shanita at [phone].

## 2018-11-29 ENCOUNTER — OFFICE VISIT (OUTPATIENT)
Dept: URGENT CARE | Facility: CLINIC | Age: 58
End: 2018-11-29
Payer: COMMERCIAL

## 2018-11-29 ENCOUNTER — TELEPHONE (OUTPATIENT)
Dept: UROGYNECOLOGY | Facility: CLINIC | Age: 58
End: 2018-11-29

## 2018-11-29 VITALS
SYSTOLIC BLOOD PRESSURE: 130 MMHG | HEART RATE: 88 BPM | DIASTOLIC BLOOD PRESSURE: 82 MMHG | HEIGHT: 62 IN | WEIGHT: 152 LBS | TEMPERATURE: 98 F | OXYGEN SATURATION: 96 % | BODY MASS INDEX: 27.97 KG/M2 | RESPIRATION RATE: 17 BRPM

## 2018-11-29 DIAGNOSIS — B34.9 ACUTE VIRAL SYNDROME: ICD-10-CM

## 2018-11-29 DIAGNOSIS — R68.89 FLU-LIKE SYMPTOMS: Primary | ICD-10-CM

## 2018-11-29 DIAGNOSIS — R52 BODY ACHES: ICD-10-CM

## 2018-11-29 LAB
CTP QC/QA: YES
FLUAV AG NPH QL: NEGATIVE
FLUBV AG NPH QL: NEGATIVE

## 2018-11-29 PROCEDURE — 3008F BODY MASS INDEX DOCD: CPT | Mod: CPTII,S$GLB,, | Performed by: NURSE PRACTITIONER

## 2018-11-29 PROCEDURE — 99214 OFFICE O/P EST MOD 30 MIN: CPT | Mod: 25,S$GLB,, | Performed by: NURSE PRACTITIONER

## 2018-11-29 PROCEDURE — 96372 THER/PROPH/DIAG INJ SC/IM: CPT | Mod: S$GLB,,, | Performed by: EMERGENCY MEDICINE

## 2018-11-29 PROCEDURE — 87804 INFLUENZA ASSAY W/OPTIC: CPT | Mod: QW,S$GLB,, | Performed by: NURSE PRACTITIONER

## 2018-11-29 RX ORDER — THYROID 90 MG/1
1 TABLET ORAL
COMMUNITY
Start: 2018-06-09 | End: 2019-02-13 | Stop reason: SDUPTHER

## 2018-11-29 RX ORDER — HYDROCHLOROTHIAZIDE 25 MG/1
25 TABLET ORAL
COMMUNITY
Start: 2018-09-10 | End: 2019-01-16 | Stop reason: SDUPTHER

## 2018-11-29 RX ORDER — PHENAZOPYRIDINE HYDROCHLORIDE 200 MG/1
200 TABLET, FILM COATED ORAL
COMMUNITY
Start: 2018-06-12 | End: 2019-06-13

## 2018-11-29 RX ORDER — CYCLOBENZAPRINE HCL 10 MG
TABLET ORAL
COMMUNITY
Start: 2018-05-20 | End: 2019-01-16 | Stop reason: SDUPTHER

## 2018-11-29 RX ORDER — FLUOCINOLONE ACETONIDE 0.11 MG/ML
OIL AURICULAR (OTIC)
COMMUNITY
Start: 2018-08-20 | End: 2019-01-16 | Stop reason: SDUPTHER

## 2018-11-29 RX ORDER — BACLOFEN 10 MG/1
10 TABLET ORAL
COMMUNITY
Start: 2017-08-31 | End: 2019-01-16 | Stop reason: SDUPTHER

## 2018-11-29 RX ORDER — NYSTATIN AND TRIAMCINOLONE ACETONIDE 100000; 1 [USP'U]/G; MG/G
CREAM TOPICAL
COMMUNITY
Start: 2018-07-05 | End: 2019-06-13

## 2018-11-29 RX ORDER — ONDANSETRON 4 MG/1
4 TABLET, ORALLY DISINTEGRATING ORAL EVERY 6 HOURS PRN
Qty: 20 TABLET | Refills: 0 | Status: SHIPPED | OUTPATIENT
Start: 2018-11-29 | End: 2019-06-13

## 2018-11-29 RX ORDER — CLONAZEPAM 0.5 MG/1
0.25 TABLET ORAL
COMMUNITY
Start: 2017-04-20 | End: 2020-09-25 | Stop reason: SDUPTHER

## 2018-11-29 RX ORDER — DILTIAZEM HYDROCHLORIDE 30 MG/1
30 TABLET, FILM COATED ORAL
COMMUNITY
Start: 2018-06-14 | End: 2019-01-16 | Stop reason: SDUPTHER

## 2018-11-29 RX ORDER — HYDROCODONE BITARTRATE AND ACETAMINOPHEN 5; 325 MG/1; MG/1
1 TABLET ORAL
Status: ON HOLD | COMMUNITY
Start: 2017-04-20 | End: 2018-12-11 | Stop reason: SDUPTHER

## 2018-11-29 RX ORDER — KETOROLAC TROMETHAMINE 30 MG/ML
30 INJECTION, SOLUTION INTRAMUSCULAR; INTRAVENOUS
Status: COMPLETED | OUTPATIENT
Start: 2018-11-29 | End: 2018-11-29

## 2018-11-29 RX ORDER — ONDANSETRON 8 MG/1
8 TABLET, ORALLY DISINTEGRATING ORAL
Status: COMPLETED | OUTPATIENT
Start: 2018-11-29 | End: 2018-11-29

## 2018-11-29 RX ADMIN — ONDANSETRON 8 MG: 8 TABLET, ORALLY DISINTEGRATING ORAL at 01:11

## 2018-11-29 RX ADMIN — KETOROLAC TROMETHAMINE 30 MG: 30 INJECTION, SOLUTION INTRAMUSCULAR; INTRAVENOUS at 01:11

## 2018-11-29 NOTE — PROGRESS NOTES
"Subjective:       Patient ID: Alma Jackson is a 58 y.o. female.    Vitals:  height is 5' 2" (1.575 m) and weight is 68.9 kg (152 lb). Her oral temperature is 98.1 °F (36.7 °C). Her blood pressure is 130/82 and her pulse is 88. Her respiration is 17 and oxygen saturation is 96%.     Chief Complaint: Chills    Onset from last night. Varied symptoms.   Patient reports chills, fatigue, mild nasal congestion rhinorrhea, nausea, body aches.  Patient denies abdominal pain, diarrhea, urinary complaints, dysuria, hesitancy, frequency, urgency.      Other   This is a new problem. The current episode started yesterday. The problem occurs constantly. The problem has been unchanged. Associated symptoms include chills, congestion (mild with rhinorrhea ), diaphoresis, fatigue, headaches, myalgias and nausea. Pertinent negatives include no abdominal pain, arthralgias, chest pain, coughing, fever, joint swelling, rash, sore throat, vertigo, vomiting or weakness.       Constitution: Positive for chills, sweating and fatigue. Negative for fever.   HENT: Positive for congestion (mild with rhinorrhea ). Negative for sore throat.    Neck: Negative for painful lymph nodes.   Cardiovascular: Negative for chest pain and leg swelling.   Eyes: Negative for double vision and blurred vision.   Respiratory: Negative for cough and shortness of breath.    Gastrointestinal: Positive for nausea. Negative for abdominal pain, abdominal bloating, vomiting and diarrhea.   Genitourinary: Negative for dysuria, frequency, urgency and history of kidney stones.   Musculoskeletal: Positive for muscle ache. Negative for joint pain, joint swelling and muscle cramps.   Skin: Negative for color change, pale, rash and bruising.   Allergic/Immunologic: Negative for seasonal allergies.   Neurological: Positive for headaches. Negative for dizziness, history of vertigo, light-headedness and passing out.   Hematologic/Lymphatic: Negative for swollen lymph " nodes.   Psychiatric/Behavioral: Negative for nervous/anxious, sleep disturbance and depression. The patient is not nervous/anxious.        Objective:      Physical Exam   Constitutional: She is oriented to person, place, and time. She appears well-developed and well-nourished. She is cooperative.  Non-toxic appearance. She does not have a sickly appearance. She appears ill. No distress.   NAD   Afebrile   Appear uncomfortable.    HENT:   Head: Normocephalic and atraumatic.   Right Ear: Hearing, tympanic membrane, external ear and ear canal normal.   Left Ear: Hearing, tympanic membrane, external ear and ear canal normal.   Nose: Rhinorrhea (mild clear ) and sinus tenderness present. No mucosal edema or nasal deformity. No epistaxis. Right sinus exhibits maxillary sinus tenderness. Right sinus exhibits no frontal sinus tenderness. Left sinus exhibits maxillary sinus tenderness. Left sinus exhibits no frontal sinus tenderness.   Mouth/Throat: Uvula is midline, oropharynx is clear and moist and mucous membranes are normal. No trismus in the jaw. Normal dentition. No uvula swelling. No oropharyngeal exudate, posterior oropharyngeal edema, posterior oropharyngeal erythema or tonsillar abscesses.   Eyes: Conjunctivae and lids are normal. No scleral icterus.   Sclera clear bilat   Neck: Trachea normal, full passive range of motion without pain and phonation normal. Neck supple.   Cardiovascular: Normal rate, normal heart sounds, intact distal pulses and normal pulses. An irregular rhythm present.   Pulmonary/Chest: Effort normal and breath sounds normal. No accessory muscle usage or stridor. No tachypnea. No respiratory distress. She has no decreased breath sounds. She has no wheezes. She has no rhonchi. She has no rales.   Abdominal: Soft. Normal appearance and bowel sounds are normal. She exhibits no distension, no abdominal bruit, no pulsatile midline mass and no mass. There is no tenderness. There is no rigidity, no  rebound, no guarding, no CVA tenderness, no tenderness at McBurney's point and negative Cabello's sign.   Abdomen is soft and flat   No CVA tenderness    Musculoskeletal: Normal range of motion. She exhibits no edema or deformity.   Neurological: She is alert and oriented to person, place, and time. She has normal strength. She exhibits normal muscle tone. Coordination normal.   Skin: Skin is warm, dry and intact. She is not diaphoretic. No pallor.   Psychiatric: She has a normal mood and affect. Her speech is normal and behavior is normal. Judgment and thought content normal. Cognition and memory are normal.   Nursing note and vitals reviewed.        Results for orders placed or performed in visit on 11/29/18   POCT Influenza A/B   Result Value Ref Range    Rapid Influenza A Ag Negative Negative    Rapid Influenza B Ag Negative Negative     Acceptable Yes        Assessment:       1. Flu-like symptoms    2. Acute viral syndrome    3. Body aches        Plan:         Flu-like symptoms  -     POCT Influenza A/B    Acute viral syndrome  -     ketorolac injection 30 mg  -     ondansetron disintegrating tablet 8 mg  -     ondansetron (ZOFRAN-ODT) 4 MG TbDL; Take 1 tablet (4 mg total) by mouth every 6 (six) hours as needed.  Dispense: 20 tablet; Refill: 0    Body aches  -     ketorolac injection 30 mg    Discussed with patient that this is likely viral. OTC agents for symptom relief reviewed.     Patient Instructions   Hydrate   Rest   [place] diet      Tylenol every 4 hours and /or Ibuprofen every 6 hours for fever or body aches   salt water gargles  Cold-eeze helps to reduce the duration of sore throat symptoms  Cepachol helps to numb the discomfort  Chloroseptic spray  Nasal saline spray reduces inflammation and dryness  Warm face compresses as often as you can  Vicks vapor rub at night  Flonase OTC or Nasacort OTC  Simple foods like chicken noodle soup help  Mucinex DM or use Coricidin HBP if you have  "hypertension  Zyrtec/Claritin/Xyzal during the day and Benadryl at night may help if allergy component   Pedialyte helps with dehydration if lacking appetite  Rest as much as you can    Please return here or go to the Emergency Department for any concerns or worsening of condition.  If you were prescribed antibiotics, please take them to completion.  If you were prescribed a narcotic medication, do not drive or operate heavy equipment or machinery while taking these medications.  Please follow up with your primary care doctor or specialist as needed.    If you  smoke, please stop smoking.      Viral Syndrome (Adult)  A viral illness may cause a number of symptoms. The symptoms depend on the part of the body that the virus affects. If it settles in your nose, throat, and lungs, it may cause cough, sore throat, congestion, and sometimes headache. If it settles in your stomach and intestinal tract, it may cause vomiting and diarrhea. Sometimes it causes vague symptoms like "aching all over," feeling tired, loss of appetite, or fever.  A viral illness usually lasts 1 to 2 weeks, but sometimes it lasts longer. In some cases, a more serious infection can look like a viral syndrome in the first few days of the illness. You may need another exam and additional tests to know the difference. Watch for the warning signs listed below.  Home care  Follow these guidelines for taking care of yourself at home:  · If symptoms are severe, rest at home for the first 2 to 3 days.  · Stay away from cigarette smoke - both your smoke and the smoke from others.  · You may use over-the-counter acetaminophen or ibuprofen for fever, muscle aching, and headache, unless another medicine was prescribed for this. If you have chronic liver or kidney disease or ever had a stomach ulcer or GI bleeding, talk with your doctor before using these medicines. No one who is younger than 18 and ill with a fever should take aspirin. It may cause severe " disease or death.  · Your appetite may be poor, so a light diet is fine. Avoid dehydration by drinking 8 to 12 8-ounce glasses of fluids each day. This may include water; orange juice; lemonade; apple, grape, and cranberry juice; clear fruit drinks; electrolyte replacement and sports drinks; and decaffeinated teas and coffee. If you have been diagnosed with a kidney disease, ask your doctor how much and what types of fluids you should drink to prevent dehydration. If you have kidney disease, drinking too much fluid can cause it build up in the your body and be dangerous to your health.  · Over-the-counter remedies won't shorten the length of the illness but may be helpful for cough, sore throat; and nasal and sinus congestion. Don't use decongestants if you have high blood pressure.  Follow-up care  Follow up with your healthcare provider if you do not improve over the next week.  Call 911  Get emergency medical care if any of the following occur:  · Convulsion  · Feeling weak, dizzy, or like you are going to faint  · Chest pain, shortness of breath, wheezing, or difficulty breathing  When to seek medical advice  Call your healthcare provider right away if any of these occur:  · Cough with lots of colored sputum (mucus) or blood in your sputum  · Chest pain, shortness of breath, wheezing, or difficulty breathing  · Severe headache; face, neck, or ear pain  · Severe, constant pain in the lower right side of your belly (abdominal)  · Continued vomiting (cant keep liquids down)  · Frequent diarrhea (more than 5 times a day); blood (red or black color) or mucus in diarrhea  · Feeling weak, dizzy, or like you are going to faint  · Extreme thirst  · Fever of 100.4°F (38°C) or higher, or as directed by your healthcare provider  Date Last Reviewed: 9/25/2015  © 9422-3120 [employer]. [address] [place] [address], [place] [address]. All rights reserved. This information is not intended as a substitute for  professional medical care. Always follow your healthcare professional's instructions.

## 2018-11-29 NOTE — PATIENT INSTRUCTIONS
"Hydrate   Rest   [place] diet      Tylenol every 4 hours and /or Ibuprofen every 6 hours for fever or body aches   salt water gargles  Cold-eeze helps to reduce the duration of sore throat symptoms  Cepachol helps to numb the discomfort  Chloroseptic spray  Nasal saline spray reduces inflammation and dryness  Warm face compresses as often as you can  Vicks vapor rub at night  Flonase OTC or Nasacort OTC  Simple foods like chicken noodle soup help  Mucinex DM or use Coricidin HBP if you have hypertension  Zyrtec/Claritin/Xyzal during the day and Benadryl at night may help if allergy component   Pedialyte helps with dehydration if lacking appetite  Rest as much as you can    Please return here or go to the Emergency Department for any concerns or worsening of condition.  If you were prescribed antibiotics, please take them to completion.  If you were prescribed a narcotic medication, do not drive or operate heavy equipment or machinery while taking these medications.  Please follow up with your primary care doctor or specialist as needed.    If you  smoke, please stop smoking.      Viral Syndrome (Adult)  A viral illness may cause a number of symptoms. The symptoms depend on the part of the body that the virus affects. If it settles in your nose, throat, and lungs, it may cause cough, sore throat, congestion, and sometimes headache. If it settles in your stomach and intestinal tract, it may cause vomiting and diarrhea. Sometimes it causes vague symptoms like "aching all over," feeling tired, loss of appetite, or fever.  A viral illness usually lasts 1 to 2 weeks, but sometimes it lasts longer. In some cases, a more serious infection can look like a viral syndrome in the first few days of the illness. You may need another exam and additional tests to know the difference. Watch for the warning signs listed below.  Home care  Follow these guidelines for taking care of yourself at home:  · If symptoms are severe, rest at " home for the first 2 to 3 days.  · Stay away from cigarette smoke - both your smoke and the smoke from others.  · You may use over-the-counter acetaminophen or ibuprofen for fever, muscle aching, and headache, unless another medicine was prescribed for this. If you have chronic liver or kidney disease or ever had a stomach ulcer or GI bleeding, talk with your doctor before using these medicines. No one who is younger than 18 and ill with a fever should take aspirin. It may cause severe disease or death.  · Your appetite may be poor, so a light diet is fine. Avoid dehydration by drinking 8 to 12 8-ounce glasses of fluids each day. This may include water; orange juice; lemonade; apple, grape, and cranberry juice; clear fruit drinks; electrolyte replacement and sports drinks; and decaffeinated teas and coffee. If you have been diagnosed with a kidney disease, ask your doctor how much and what types of fluids you should drink to prevent dehydration. If you have kidney disease, drinking too much fluid can cause it build up in the your body and be dangerous to your health.  · Over-the-counter remedies won't shorten the length of the illness but may be helpful for cough, sore throat; and nasal and sinus congestion. Don't use decongestants if you have high blood pressure.  Follow-up care  Follow up with your healthcare provider if you do not improve over the next week.  Call 911  Get emergency medical care if any of the following occur:  · Convulsion  · Feeling weak, dizzy, or like you are going to faint  · Chest pain, shortness of breath, wheezing, or difficulty breathing  When to seek medical advice  Call your healthcare provider right away if any of these occur:  · Cough with lots of colored sputum (mucus) or blood in your sputum  · Chest pain, shortness of breath, wheezing, or difficulty breathing  · Severe headache; face, neck, or ear pain  · Severe, constant pain in the lower right side of your belly  (abdominal)  · Continued vomiting (cant keep liquids down)  · Frequent diarrhea (more than 5 times a day); blood (red or black color) or mucus in diarrhea  · Feeling weak, dizzy, or like you are going to faint  · Extreme thirst  · Fever of 100.4°F (38°C) or higher, or as directed by your healthcare provider  Date Last Reviewed: 9/25/2015  © 7432-0073 [employer]. [address] [place] [address], [place] [address]. All rights reserved. This information is not intended as a substitute for professional medical care. Always follow your healthcare professional's instructions.

## 2018-12-06 ENCOUNTER — PATIENT MESSAGE (OUTPATIENT)
Dept: UROGYNECOLOGY | Facility: CLINIC | Age: 58
End: 2018-12-06

## 2018-12-06 ENCOUNTER — PATIENT MESSAGE (OUTPATIENT)
Dept: ENDOCRINOLOGY | Facility: CLINIC | Age: 58
End: 2018-12-06

## 2018-12-06 DIAGNOSIS — E89.0 POSTABLATIVE HYPOTHYROIDISM: Primary | ICD-10-CM

## 2018-12-07 ENCOUNTER — TELEPHONE (OUTPATIENT)
Dept: UROGYNECOLOGY | Facility: CLINIC | Age: 58
End: 2018-12-07

## 2018-12-07 NOTE — TELEPHONE ENCOUNTER
Pt was informed that her surgical clearance was received and she is all set for surgery on Monday 12/10. She was also given the arrival time via pt portal.

## 2018-12-07 NOTE — TELEPHONE ENCOUNTER
----- Message from Flor Eddy sent at 12/7/2018  3:58 PM CST -----  Contact: Patient   Patient returned missed call and would like a call back at [phone].

## 2018-12-07 NOTE — TELEPHONE ENCOUNTER
Attempted to contact pt to remind her of her surgery arrival time for 12/10/18 7 am arrival, no answer voice mail not set up.

## 2018-12-10 ENCOUNTER — PATIENT MESSAGE (OUTPATIENT)
Dept: ENDOCRINOLOGY | Facility: CLINIC | Age: 58
End: 2018-12-10

## 2018-12-10 ENCOUNTER — HOSPITAL ENCOUNTER (OUTPATIENT)
Facility: OTHER | Age: 58
Discharge: HOME | End: 2018-12-11
Attending: OBSTETRICS & GYNECOLOGY | Admitting: OBSTETRICS & GYNECOLOGY
Payer: COMMERCIAL

## 2018-12-10 ENCOUNTER — ANESTHESIA (OUTPATIENT)
Dept: SURGERY | Facility: OTHER | Age: 58
End: 2018-12-10
Payer: COMMERCIAL

## 2018-12-10 ENCOUNTER — TELEPHONE (OUTPATIENT)
Dept: ENDOCRINOLOGY | Facility: CLINIC | Age: 58
End: 2018-12-10

## 2018-12-10 DIAGNOSIS — N81.10 CYSTOCELE WITH RECTOCELE: Primary | ICD-10-CM

## 2018-12-10 DIAGNOSIS — Z90.710 S/P LAPAROSCOPIC HYSTERECTOMY: ICD-10-CM

## 2018-12-10 DIAGNOSIS — E89.0 POSTABLATIVE HYPOTHYROIDISM: Primary | ICD-10-CM

## 2018-12-10 DIAGNOSIS — Z01.818 PREOP TESTING: ICD-10-CM

## 2018-12-10 DIAGNOSIS — N81.6 CYSTOCELE WITH RECTOCELE: Primary | ICD-10-CM

## 2018-12-10 LAB
ABO + RH BLD: NORMAL
BLD GP AB SCN CELLS X3 SERPL QL: NORMAL
POCT GLUCOSE: 84 MG/DL (ref 70–110)

## 2018-12-10 PROCEDURE — 63600175 PHARM REV CODE 636 W HCPCS: Performed by: NURSE ANESTHETIST, CERTIFIED REGISTERED

## 2018-12-10 PROCEDURE — 63600175 PHARM REV CODE 636 W HCPCS: Performed by: ANESTHESIOLOGY

## 2018-12-10 PROCEDURE — 25000003 PHARM REV CODE 250: Performed by: STUDENT IN AN ORGANIZED HEALTH CARE EDUCATION/TRAINING PROGRAM

## 2018-12-10 PROCEDURE — 11406 PR EXC SKIN BENIG >4 CM TRUNK,ARM,LEG: ICD-10-PCS | Mod: 51,,, | Performed by: OBSTETRICS & GYNECOLOGY

## 2018-12-10 PROCEDURE — 25000003 PHARM REV CODE 250: Performed by: OBSTETRICS & GYNECOLOGY

## 2018-12-10 PROCEDURE — 63600175 PHARM REV CODE 636 W HCPCS: Performed by: SPECIALIST

## 2018-12-10 PROCEDURE — 88305 TISSUE EXAM BY PATHOLOGIST: CPT | Mod: 26,,, | Performed by: PATHOLOGY

## 2018-12-10 PROCEDURE — 25000003 PHARM REV CODE 250: Performed by: SPECIALIST

## 2018-12-10 PROCEDURE — 37000009 HC ANESTHESIA EA ADD 15 MINS: Performed by: OBSTETRICS & GYNECOLOGY

## 2018-12-10 PROCEDURE — S0020 INJECTION, BUPIVICAINE HYDRO: HCPCS | Performed by: OBSTETRICS & GYNECOLOGY

## 2018-12-10 PROCEDURE — 57250 PR POST COLPORRHAPHY,RECTUM/VAGINA: ICD-10-PCS | Mod: 51,,, | Performed by: OBSTETRICS & GYNECOLOGY

## 2018-12-10 PROCEDURE — 36000710: Performed by: OBSTETRICS & GYNECOLOGY

## 2018-12-10 PROCEDURE — 57268 PR REPAIR ENTEROCELE,VAG APPRCH: ICD-10-PCS | Mod: 51,,, | Performed by: OBSTETRICS & GYNECOLOGY

## 2018-12-10 PROCEDURE — C1765 ADHESION BARRIER: HCPCS | Performed by: OBSTETRICS & GYNECOLOGY

## 2018-12-10 PROCEDURE — 88305 TISSUE EXAM BY PATHOLOGIST: CPT | Performed by: PATHOLOGY

## 2018-12-10 PROCEDURE — 36000711: Performed by: OBSTETRICS & GYNECOLOGY

## 2018-12-10 PROCEDURE — 57268 REPAIR OF BOWEL BULGE: CPT | Mod: 51,,, | Performed by: OBSTETRICS & GYNECOLOGY

## 2018-12-10 PROCEDURE — 36415 COLL VENOUS BLD VENIPUNCTURE: CPT

## 2018-12-10 PROCEDURE — 88305 TISSUE SPECIMEN TO PATHOLOGY - SURGERY: ICD-10-PCS | Mod: 26,,, | Performed by: PATHOLOGY

## 2018-12-10 PROCEDURE — 57250 REPAIR RECTUM & VAGINA: CPT | Mod: 51,,, | Performed by: OBSTETRICS & GYNECOLOGY

## 2018-12-10 PROCEDURE — 25000003 PHARM REV CODE 250: Performed by: NURSE ANESTHETIST, CERTIFIED REGISTERED

## 2018-12-10 PROCEDURE — 58571 TLH W/T/O 250 G OR LESS: CPT | Mod: 22,,, | Performed by: OBSTETRICS & GYNECOLOGY

## 2018-12-10 PROCEDURE — 88307 TISSUE SPECIMEN TO PATHOLOGY - SURGERY: ICD-10-PCS | Mod: 26,,, | Performed by: PATHOLOGY

## 2018-12-10 PROCEDURE — 88307 TISSUE EXAM BY PATHOLOGIST: CPT | Mod: 26,,, | Performed by: PATHOLOGY

## 2018-12-10 PROCEDURE — 63600175 PHARM REV CODE 636 W HCPCS: Performed by: OBSTETRICS & GYNECOLOGY

## 2018-12-10 PROCEDURE — 71000039 HC RECOVERY, EACH ADD'L HOUR: Performed by: OBSTETRICS & GYNECOLOGY

## 2018-12-10 PROCEDURE — 63600175 PHARM REV CODE 636 W HCPCS: Mod: JG | Performed by: NURSE ANESTHETIST, CERTIFIED REGISTERED

## 2018-12-10 PROCEDURE — 99900035 HC TECH TIME PER 15 MIN (STAT)

## 2018-12-10 PROCEDURE — 27201423 OPTIME MED/SURG SUP & DEVICES STERILE SUPPLY: Performed by: OBSTETRICS & GYNECOLOGY

## 2018-12-10 PROCEDURE — 82962 GLUCOSE BLOOD TEST: CPT | Performed by: OBSTETRICS & GYNECOLOGY

## 2018-12-10 PROCEDURE — 71000033 HC RECOVERY, INTIAL HOUR: Performed by: OBSTETRICS & GYNECOLOGY

## 2018-12-10 PROCEDURE — 37000008 HC ANESTHESIA 1ST 15 MINUTES: Performed by: OBSTETRICS & GYNECOLOGY

## 2018-12-10 PROCEDURE — 25000003 PHARM REV CODE 250: Performed by: ANESTHESIOLOGY

## 2018-12-10 PROCEDURE — 11406 EXC TR-EXT B9+MARG >4.0 CM: CPT | Mod: 51,,, | Performed by: OBSTETRICS & GYNECOLOGY

## 2018-12-10 PROCEDURE — 94761 N-INVAS EAR/PLS OXIMETRY MLT: CPT

## 2018-12-10 PROCEDURE — 86901 BLOOD TYPING SEROLOGIC RH(D): CPT

## 2018-12-10 PROCEDURE — 58571 PR LAPAROSCOPY W TOT HYSTERECTUTERUS <=250 GRAM  W TUBE/OVARY: ICD-10-PCS | Mod: 22,,, | Performed by: OBSTETRICS & GYNECOLOGY

## 2018-12-10 DEVICE — BARRIER INTERCEED 3X4 ST DIS: Type: IMPLANTABLE DEVICE | Site: PELVIS | Status: FUNCTIONAL

## 2018-12-10 RX ORDER — DEXTROSE MONOHYDRATE, SODIUM CHLORIDE, AND POTASSIUM CHLORIDE 50; 1.49; 9 G/1000ML; G/1000ML; G/1000ML
INJECTION, SOLUTION INTRAVENOUS CONTINUOUS
Status: DISCONTINUED | OUTPATIENT
Start: 2018-12-10 | End: 2018-12-11 | Stop reason: HOSPADM

## 2018-12-10 RX ORDER — ACETAMINOPHEN 10 MG/ML
INJECTION, SOLUTION INTRAVENOUS
Status: DISCONTINUED | OUTPATIENT
Start: 2018-12-10 | End: 2018-12-10

## 2018-12-10 RX ORDER — FENTANYL CITRATE 50 UG/ML
25 INJECTION, SOLUTION INTRAMUSCULAR; INTRAVENOUS EVERY 5 MIN PRN
Status: DISCONTINUED | OUTPATIENT
Start: 2018-12-10 | End: 2018-12-10 | Stop reason: HOSPADM

## 2018-12-10 RX ORDER — HYDROCODONE BITARTRATE AND ACETAMINOPHEN 5; 325 MG/1; MG/1
1 TABLET ORAL EVERY 4 HOURS PRN
Status: DISCONTINUED | OUTPATIENT
Start: 2018-12-10 | End: 2018-12-11 | Stop reason: HOSPADM

## 2018-12-10 RX ORDER — ONDANSETRON 2 MG/ML
4 INJECTION INTRAMUSCULAR; INTRAVENOUS DAILY PRN
Status: DISCONTINUED | OUTPATIENT
Start: 2018-12-10 | End: 2018-12-10 | Stop reason: HOSPADM

## 2018-12-10 RX ORDER — HYDROMORPHONE HYDROCHLORIDE 2 MG/ML
0.4 INJECTION, SOLUTION INTRAMUSCULAR; INTRAVENOUS; SUBCUTANEOUS EVERY 5 MIN PRN
Status: DISCONTINUED | OUTPATIENT
Start: 2018-12-10 | End: 2018-12-10 | Stop reason: HOSPADM

## 2018-12-10 RX ORDER — MUPIROCIN 20 MG/G
1 OINTMENT TOPICAL 2 TIMES DAILY
Status: DISCONTINUED | OUTPATIENT
Start: 2018-12-10 | End: 2018-12-11 | Stop reason: HOSPADM

## 2018-12-10 RX ORDER — OXYCODONE HYDROCHLORIDE 5 MG/1
5 TABLET ORAL ONCE AS NEEDED
Status: ACTIVE | OUTPATIENT
Start: 2018-12-10 | End: 2018-12-10

## 2018-12-10 RX ORDER — FAMOTIDINE 20 MG/1
20 TABLET, FILM COATED ORAL
Status: COMPLETED | OUTPATIENT
Start: 2018-12-10 | End: 2018-12-10

## 2018-12-10 RX ORDER — BUPIVACAINE HYDROCHLORIDE 5 MG/ML
INJECTION, SOLUTION EPIDURAL; INTRACAUDAL
Status: DISCONTINUED | OUTPATIENT
Start: 2018-12-10 | End: 2018-12-10 | Stop reason: HOSPADM

## 2018-12-10 RX ORDER — OXYCODONE HYDROCHLORIDE 5 MG/1
5 TABLET ORAL
Status: DISCONTINUED | OUTPATIENT
Start: 2018-12-10 | End: 2018-12-10 | Stop reason: HOSPADM

## 2018-12-10 RX ORDER — VASOPRESSIN 20 [USP'U]/ML
INJECTION, SOLUTION INTRAMUSCULAR; SUBCUTANEOUS
Status: DISCONTINUED | OUTPATIENT
Start: 2018-12-10 | End: 2018-12-10 | Stop reason: HOSPADM

## 2018-12-10 RX ORDER — FENTANYL CITRATE 50 UG/ML
INJECTION, SOLUTION INTRAMUSCULAR; INTRAVENOUS
Status: DISCONTINUED | OUTPATIENT
Start: 2018-12-10 | End: 2018-12-10

## 2018-12-10 RX ORDER — THYROID 30 MG/1
90 TABLET ORAL
Status: DISCONTINUED | OUTPATIENT
Start: 2018-12-11 | End: 2018-12-11 | Stop reason: HOSPADM

## 2018-12-10 RX ORDER — DIPHENHYDRAMINE HYDROCHLORIDE 50 MG/ML
25 INJECTION INTRAMUSCULAR; INTRAVENOUS EVERY 4 HOURS PRN
Status: DISCONTINUED | OUTPATIENT
Start: 2018-12-10 | End: 2018-12-11 | Stop reason: HOSPADM

## 2018-12-10 RX ORDER — DEXAMETHASONE SODIUM PHOSPHATE 4 MG/ML
INJECTION, SOLUTION INTRA-ARTICULAR; INTRALESIONAL; INTRAMUSCULAR; INTRAVENOUS; SOFT TISSUE
Status: DISCONTINUED | OUTPATIENT
Start: 2018-12-10 | End: 2018-12-10

## 2018-12-10 RX ORDER — LIDOCAINE HYDROCHLORIDE 10 MG/ML
0.5 INJECTION, SOLUTION EPIDURAL; INFILTRATION; INTRACAUDAL; PERINEURAL ONCE
Status: DISCONTINUED | OUTPATIENT
Start: 2018-12-10 | End: 2018-12-10 | Stop reason: HOSPADM

## 2018-12-10 RX ORDER — ONDANSETRON 8 MG/1
8 TABLET, ORALLY DISINTEGRATING ORAL EVERY 8 HOURS PRN
Status: DISCONTINUED | OUTPATIENT
Start: 2018-12-10 | End: 2018-12-11 | Stop reason: HOSPADM

## 2018-12-10 RX ORDER — BISACODYL 10 MG
10 SUPPOSITORY, RECTAL RECTAL DAILY PRN
Status: DISCONTINUED | OUTPATIENT
Start: 2018-12-10 | End: 2018-12-11 | Stop reason: HOSPADM

## 2018-12-10 RX ORDER — DIPHENHYDRAMINE HCL 25 MG
25 CAPSULE ORAL EVERY 4 HOURS PRN
Status: DISCONTINUED | OUTPATIENT
Start: 2018-12-10 | End: 2018-12-11 | Stop reason: HOSPADM

## 2018-12-10 RX ORDER — PHENYLEPHRINE HYDROCHLORIDE 10 MG/ML
INJECTION INTRAVENOUS
Status: DISCONTINUED | OUTPATIENT
Start: 2018-12-10 | End: 2018-12-10

## 2018-12-10 RX ORDER — HYDROCODONE BITARTRATE AND ACETAMINOPHEN 10; 325 MG/1; MG/1
1 TABLET ORAL EVERY 4 HOURS PRN
Status: DISCONTINUED | OUTPATIENT
Start: 2018-12-10 | End: 2018-12-11 | Stop reason: HOSPADM

## 2018-12-10 RX ORDER — SODIUM CHLORIDE, SODIUM LACTATE, POTASSIUM CHLORIDE, CALCIUM CHLORIDE 600; 310; 30; 20 MG/100ML; MG/100ML; MG/100ML; MG/100ML
INJECTION, SOLUTION INTRAVENOUS CONTINUOUS
Status: DISCONTINUED | OUTPATIENT
Start: 2018-12-10 | End: 2018-12-10

## 2018-12-10 RX ORDER — DEXTROSE MONOHYDRATE, SODIUM CHLORIDE, AND POTASSIUM CHLORIDE 50; 1.49; 4.5 G/1000ML; G/1000ML; G/1000ML
INJECTION, SOLUTION INTRAVENOUS CONTINUOUS
Status: DISCONTINUED | OUTPATIENT
Start: 2018-12-10 | End: 2018-12-10

## 2018-12-10 RX ORDER — MIDAZOLAM HYDROCHLORIDE 5 MG/ML
5 INJECTION INTRAMUSCULAR; INTRAVENOUS ONCE AS NEEDED
Status: COMPLETED | OUTPATIENT
Start: 2018-12-10 | End: 2018-12-10

## 2018-12-10 RX ORDER — IBUPROFEN 600 MG/1
600 TABLET ORAL EVERY 6 HOURS
Status: DISCONTINUED | OUTPATIENT
Start: 2018-12-10 | End: 2018-12-11 | Stop reason: HOSPADM

## 2018-12-10 RX ORDER — ROCURONIUM BROMIDE 10 MG/ML
INJECTION, SOLUTION INTRAVENOUS
Status: DISCONTINUED | OUTPATIENT
Start: 2018-12-10 | End: 2018-12-10

## 2018-12-10 RX ORDER — ONDANSETRON HYDROCHLORIDE 2 MG/ML
INJECTION, SOLUTION INTRAMUSCULAR; INTRAVENOUS
Status: DISCONTINUED | OUTPATIENT
Start: 2018-12-10 | End: 2018-12-10

## 2018-12-10 RX ORDER — LIDOCAINE HCL/PF 100 MG/5ML
SYRINGE (ML) INTRAVENOUS
Status: DISCONTINUED | OUTPATIENT
Start: 2018-12-10 | End: 2018-12-10

## 2018-12-10 RX ORDER — CEFAZOLIN SODIUM 1 G/3ML
2 INJECTION, POWDER, FOR SOLUTION INTRAMUSCULAR; INTRAVENOUS
Status: COMPLETED | OUTPATIENT
Start: 2018-12-10 | End: 2018-12-10

## 2018-12-10 RX ORDER — SODIUM CHLORIDE 0.9 % (FLUSH) 0.9 %
3 SYRINGE (ML) INJECTION
Status: DISCONTINUED | OUTPATIENT
Start: 2018-12-10 | End: 2018-12-10

## 2018-12-10 RX ORDER — NEOSTIGMINE METHYLSULFATE 1 MG/ML
INJECTION, SOLUTION INTRAVENOUS
Status: DISCONTINUED | OUTPATIENT
Start: 2018-12-10 | End: 2018-12-10

## 2018-12-10 RX ORDER — GLYCOPYRROLATE 0.2 MG/ML
INJECTION INTRAMUSCULAR; INTRAVENOUS
Status: DISCONTINUED | OUTPATIENT
Start: 2018-12-10 | End: 2018-12-10

## 2018-12-10 RX ORDER — ENOXAPARIN SODIUM 100 MG/ML
40 INJECTION SUBCUTANEOUS EVERY 24 HOURS
Status: DISCONTINUED | OUTPATIENT
Start: 2018-12-11 | End: 2018-12-11 | Stop reason: HOSPADM

## 2018-12-10 RX ORDER — METHYLENE BLUE 10 MG/ML
INJECTION INTRAVENOUS
Status: DISCONTINUED | OUTPATIENT
Start: 2018-12-10 | End: 2018-12-10

## 2018-12-10 RX ORDER — PROPOFOL 10 MG/ML
VIAL (ML) INTRAVENOUS
Status: DISCONTINUED | OUTPATIENT
Start: 2018-12-10 | End: 2018-12-10

## 2018-12-10 RX ORDER — MEPERIDINE HYDROCHLORIDE 50 MG/ML
12.5 INJECTION INTRAMUSCULAR; INTRAVENOUS; SUBCUTANEOUS ONCE AS NEEDED
Status: DISCONTINUED | OUTPATIENT
Start: 2018-12-10 | End: 2018-12-10 | Stop reason: HOSPADM

## 2018-12-10 RX ADMIN — PROMETHAZINE HYDROCHLORIDE 6.25 MG: 25 INJECTION INTRAMUSCULAR; INTRAVENOUS at 04:12

## 2018-12-10 RX ADMIN — PHENYLEPHRINE HYDROCHLORIDE 100 MCG: 10 INJECTION INTRAVENOUS at 11:12

## 2018-12-10 RX ADMIN — ROCURONIUM BROMIDE 10 MG: 10 INJECTION INTRAVENOUS at 12:12

## 2018-12-10 RX ADMIN — LIDOCAINE HYDROCHLORIDE 100 MG: 20 INJECTION, SOLUTION INTRAVENOUS at 10:12

## 2018-12-10 RX ADMIN — FENTANYL CITRATE 25 MCG: 50 INJECTION, SOLUTION INTRAMUSCULAR; INTRAVENOUS at 04:12

## 2018-12-10 RX ADMIN — SODIUM CHLORIDE, SODIUM LACTATE, POTASSIUM CHLORIDE, AND CALCIUM CHLORIDE: 600; 310; 30; 20 INJECTION, SOLUTION INTRAVENOUS at 11:12

## 2018-12-10 RX ADMIN — FENTANYL CITRATE 150 MCG: 50 INJECTION, SOLUTION INTRAMUSCULAR; INTRAVENOUS at 10:12

## 2018-12-10 RX ADMIN — PROPOFOL 150 MG: 10 INJECTION, EMULSION INTRAVENOUS at 10:12

## 2018-12-10 RX ADMIN — ONDANSETRON 4 MG: 2 INJECTION, SOLUTION INTRAMUSCULAR; INTRAVENOUS at 10:12

## 2018-12-10 RX ADMIN — MUPIROCIN 1 G: 20 OINTMENT TOPICAL at 08:12

## 2018-12-10 RX ADMIN — PROPOFOL 50 MG: 10 INJECTION, EMULSION INTRAVENOUS at 02:12

## 2018-12-10 RX ADMIN — PHENYLEPHRINE HYDROCHLORIDE 100 MCG: 10 INJECTION INTRAVENOUS at 10:12

## 2018-12-10 RX ADMIN — PROPOFOL 50 MG: 10 INJECTION, EMULSION INTRAVENOUS at 12:12

## 2018-12-10 RX ADMIN — SODIUM CHLORIDE, SODIUM LACTATE, POTASSIUM CHLORIDE, AND CALCIUM CHLORIDE: 600; 310; 30; 20 INJECTION, SOLUTION INTRAVENOUS at 02:12

## 2018-12-10 RX ADMIN — FENTANYL CITRATE 50 MCG: 50 INJECTION, SOLUTION INTRAMUSCULAR; INTRAVENOUS at 01:12

## 2018-12-10 RX ADMIN — FENTANYL CITRATE 50 MCG: 50 INJECTION, SOLUTION INTRAMUSCULAR; INTRAVENOUS at 12:12

## 2018-12-10 RX ADMIN — CEFAZOLIN 2 G: 330 INJECTION, POWDER, FOR SOLUTION INTRAMUSCULAR; INTRAVENOUS at 10:12

## 2018-12-10 RX ADMIN — METHYLENE BLUE 50 MG: 10 INJECTION, SOLUTION INTRAVENOUS at 12:12

## 2018-12-10 RX ADMIN — DEXAMETHASONE SODIUM PHOSPHATE 8 MG: 4 INJECTION, SOLUTION INTRAMUSCULAR; INTRAVENOUS at 10:12

## 2018-12-10 RX ADMIN — ROCURONIUM BROMIDE 10 MG: 10 INJECTION INTRAVENOUS at 11:12

## 2018-12-10 RX ADMIN — PROMETHAZINE HYDROCHLORIDE 6.25 MG: 25 INJECTION INTRAMUSCULAR; INTRAVENOUS at 03:12

## 2018-12-10 RX ADMIN — CARBOXYMETHYLCELLULOSE SODIUM 2 DROP: 2.5 SOLUTION/ DROPS OPHTHALMIC at 10:12

## 2018-12-10 RX ADMIN — DEXTROSE MONOHYDRATE, SODIUM CHLORIDE, AND POTASSIUM CHLORIDE: 50; 9; 1.49 INJECTION, SOLUTION INTRAVENOUS at 06:12

## 2018-12-10 RX ADMIN — GLYCOPYRROLATE 0.6 MG: 0.2 INJECTION, SOLUTION INTRAMUSCULAR; INTRAVENOUS at 03:12

## 2018-12-10 RX ADMIN — CEFAZOLIN 2 G: 330 INJECTION, POWDER, FOR SOLUTION INTRAMUSCULAR; INTRAVENOUS at 01:12

## 2018-12-10 RX ADMIN — SODIUM CHLORIDE, SODIUM LACTATE, POTASSIUM CHLORIDE, AND CALCIUM CHLORIDE: 600; 310; 30; 20 INJECTION, SOLUTION INTRAVENOUS at 08:12

## 2018-12-10 RX ADMIN — MIDAZOLAM HYDROCHLORIDE 5 MG: 5 INJECTION, SOLUTION INTRAMUSCULAR; INTRAVENOUS at 07:12

## 2018-12-10 RX ADMIN — ROCURONIUM BROMIDE 10 MG: 10 INJECTION INTRAVENOUS at 01:12

## 2018-12-10 RX ADMIN — ACETAMINOPHEN 1000 MG: 10 INJECTION, SOLUTION INTRAVENOUS at 10:12

## 2018-12-10 RX ADMIN — ONDANSETRON 8 MG: 8 TABLET, ORALLY DISINTEGRATING ORAL at 08:12

## 2018-12-10 RX ADMIN — FAMOTIDINE 20 MG: 20 TABLET, FILM COATED ORAL at 07:12

## 2018-12-10 RX ADMIN — ROCURONIUM BROMIDE 50 MG: 10 INJECTION INTRAVENOUS at 10:12

## 2018-12-10 RX ADMIN — FENTANYL CITRATE 50 MCG: 50 INJECTION, SOLUTION INTRAMUSCULAR; INTRAVENOUS at 03:12

## 2018-12-10 RX ADMIN — NEOSTIGMINE METHYLSULFATE 5 MG: 1 INJECTION INTRAVENOUS at 03:12

## 2018-12-10 RX ADMIN — HYDROCODONE BITARTRATE AND ACETAMINOPHEN 1 TABLET: 10; 325 TABLET ORAL at 08:12

## 2018-12-10 NOTE — INTERVAL H&P NOTE
The patient has been examined and the H&P has been reviewed:    I concur with the findings and changes have been noted since the H&P was written: see below:     1. Patient with AUB on hormone replacement persistent ovarian cyst, bothersome rectocele. SUDS performed with ? Cough induced DI. Urethra very stenotic unable to fully enter the bladder neck and the bladder. MRI essentially normal. R/B/A reviewed with the patient. Recommend continuing with the hysterectomy, BSO, and rectocele repair. She understands we may need to further suspend the top of the vaginal with a uterosacral ligament suspension, urethral dilatation and cystourethroscopy.   R/B/A reviewed. Specific risks reviewed include:  infection, bleeding, need for blood transfusion, damage to surrounding structures, anesthesia risks, death, heart attack, stroke, pain, dyspareunia, urinary retention, voiding dysfunction, urinary incontinence, exacerbation of urinary urge incontinence, and need for further surgeries.  We reviewed potential for failure of POP defect repair and need for future surgery, with no way of predicting risk.  She understands success rate of posterior repair approaches 65%.  Alternatives reviewed include: pessary/PT for POP.  All questions were answered and consents again reviewed with the patient  today and placed in the chart.  2. Cleared for OR per PCP  3. VTE Prophylaxis:  SCDs  4. Proceed to OR for above-mentioned procedure.      Anesthesia/Surgery risks, benefits and alternative options discussed and understood by patient/family.      Active Hospital Problems    Diagnosis  POA    Cystocele with rectocele [N81.10, N81.6]  Yes      Resolved Hospital Problems   No resolved problems to display.

## 2018-12-10 NOTE — BRIEF OP NOTE
[facility]  Brief Operative Note    SUMMARY     Surgery Date: 12/10/2018     Surgeon(s) and Role:     * Pinky Ko DO - Primary    Assisting Surgeon: Nicole Wayne MD- Resident    Pre-op Diagnosis:  Urinary urgency [R39.15]  Mixed stress and urge urinary incontinence [N39.46]  Abnormal uterine bleeding [N93.9]  Midline cystocele [N81.11]  Rectocele [N81.6]  Mixed incontinence urge and stress [N39.46]    Post-op Diagnosis:  Post-Op Diagnosis Codes:     * Urinary urgency [R39.15]     * Mixed stress and urge urinary incontinence [N39.46]     * Abnormal uterine bleeding [N93.9]     * Midline cystocele [N81.11]     * Rectocele [N81.6]     * Mixed incontinence urge and stress [N39.46]    Procedure(s) (LRB):  HYSTERECTOMY, TOTAL, LAPAROSCOPIC; excision of umbilical mole (N/A)  SALPINGO-OOPHORECTOMY, LAPAROSCOPIC (Bilateral)  COLPORRHAPHY, POSTERIOR (N/A)  CYSTOSCOPY (N/A)  REPAIR, ENTEROCELE (N/A)  DILATION, URETHRA (N/A)    Anesthesia: General    Description of the findings of the procedure:   - 10 week uterus with intramural posterior fibroid  - 3 cm right ovarian cyst  - 0.5 cm  solid nodule of the left ovary  - Pelvic washings for cytology, total hysterectomy with bilateral salpingectomy/oophorectomy performed without complication  - Rectocele/enterocele repaired without complication  - Umbilical mole excised  - Hemostasis confirmed at close of case      Estimated Blood Loss: 50 cc         Specimens:   Specimen (12h ago, onward)    Start     Ordered    12/10/18 1450  Specimen to Pathology - Surgery  Once     Comments:  1. Umbilical mole     Start Status   12/10/18 1450 Collected (12/10/18 1449)       12/10/18 1449    12/10/18 1408  Specimen to Pathology - Surgery  Once     Comments:  1. Uterus, cervix, bilateral tubes and ovaries2. vagina     Start Status   12/10/18 1408 Collected (12/10/18 1409)       12/10/18 1409          Nicole Wayne MD  Ob/Gyn PGY-3

## 2018-12-10 NOTE — ANESTHESIA POSTPROCEDURE EVALUATION
"Anesthesia Post Evaluation    Patient: Alma Jackson    Procedure(s) Performed: Procedure(s) (LRB):  HYSTERECTOMY, TOTAL, LAPAROSCOPIC; excision of umbilical mole (N/A)  SALPINGO-OOPHORECTOMY, LAPAROSCOPIC (Bilateral)  COLPORRHAPHY, POSTERIOR (N/A)  CYSTOSCOPY (N/A)  REPAIR, ENTEROCELE (N/A)  DILATION, URETHRA (N/A)    Final Anesthesia Type: general  Patient location during evaluation: PACU  Patient participation: Yes- Able to Participate  Level of consciousness: awake and alert  Post-procedure vital signs: reviewed and stable  Pain management: adequate  Airway patency: patent  PONV status at discharge: No PONV  Anesthetic complications: no      Cardiovascular status: blood pressure returned to baseline  Respiratory status: unassisted  Hydration status: euvolemic  Follow-up not needed.        Visit Vitals  BP (!) 150/70   Pulse 92   Temp 36.5 °C (97.7 °F) (Oral)   Resp 18   Ht 5' 2" (1.575 m)   Wt 70.1 kg (154 lb 8.7 oz)   LMP  (LMP Unknown)   SpO2 100%   Breastfeeding? No   BMI 28.27 kg/m²       Pain/Bernardino Score: Pain Rating Prior to Med Admin: 5 (12/10/2018  4:20 PM)  Pain Rating Post Med Admin: 3 (12/10/2018  4:35 PM)  Bernaridno Score: 8 (12/10/2018  3:35 PM)        "

## 2018-12-10 NOTE — TRANSFER OF CARE
"Anesthesia Transfer of Care Note    Patient: Alma Jackson    Procedure(s) Performed: Procedure(s) (LRB):  HYSTERECTOMY, TOTAL, LAPAROSCOPIC; excision of umbilical mole (N/A)  SALPINGO-OOPHORECTOMY, LAPAROSCOPIC (Bilateral)  COLPORRHAPHY, COMBINED ANTEROPOSTERIOR (N/A)  CYSTOSCOPY (N/A)  REPAIR, ENTEROCELE (N/A)  DILATION, URETHRA (N/A)    Patient location: PACU    Anesthesia Type: general    Transport from OR: Transported from OR on 2-3 L/min O2 by NC with adequate spontaneous ventilation    Post pain: adequate analgesia    Post assessment: no apparent anesthetic complications    Level of consciousness: awake    Nausea/Vomiting: no nausea/vomiting    Complications: none    Transfer of care protocol was followed      Last vitals:   Visit Vitals  /73 (BP Location: Left arm, Patient Position: Lying)   Pulse 75   Temp 36.6 °C (97.8 °F) (Oral)   Resp 16   Ht 5' 2" (1.575 m)   Wt 70.1 kg (154 lb 8.7 oz)   LMP  (LMP Unknown)   SpO2 95%   Breastfeeding? No   BMI 28.27 kg/m²     "

## 2018-12-10 NOTE — OP NOTE
THIS IS A CONFIDENTIAL AND PRIVILEGED COMMUNICATION  PLEASE DISPOSE OF PAPER COPIES APPROPRIATELY  [facility]  OPERATIVE REPORT      PATIENT NAME: Alma Jackson     MEDICAL RECORD NUMBER: [id number]     PROCEDURE DATE: 12/10/2018    PROCEDURE:   1. Total Laparoscopic Hysterectomy  2.  Laparoscopic Bilateral Salpingo-oophorectomy  3.  Extensive Lysis of bladder to lower uterine segment (50 minutes of dissection)   4.  Enterocele repair   5.  Rectocele repair  6.  Urethral dilation   7.  Cystourethroscopy  8.  Excision of umbilical lesion      ATTENDING SURGEON:  Pinky Ko DO     ASSISTANT:  Janna Rodas PGY 3    PREOPERATIVE DIAGNOSIS:   1. Posterior vaginal prolapse  2. Enterocele   3. Defecatory dysfunction  4. Urethral stenosis   5. Umbilical lesion   6. AUB     POSTOPERATIVE DIAGNOSIS: same    ANESTHESIA: GET    WOUND CLASSIFICATION: Clean-Contaminated    ANTIBIOTIC PROPHYLAXIS: Kefzol    FINDINGS: 10 week uterus with intramural posterior fibroid, 3 cm right ovarian cyst, .5 cm  solid nodule of the left ovary, bladder densely adhered to the lower uterine segment.     SPECIMENS SENT:   1. Washings for cytology   2. Uterus, cervix bilateral tubes and ovaries  3. Vaginal epithelium  4. Umbilical Nodule mole     COMPLICATIONS: None      DRAINS: Oliver 500 mL    ESTIMATED BLOOD LOSS:  50 mL    IV FLUIDS: 2400 mL LR    POSTOP CONDITION: stable    PROCEDURE:    The patient was identified in the preoperative area where informed consent was confirmed, and she was taken to the operating room where an adequate level of general anesthesia was obtained.  The patient was positioned in high lithotomy position with legs in yellow fin stirrups. Care was taken to avoid joint hyperflexion or hyperextension, and all extremity surfaces were carefully padded so as to minimize risk of neurologic injury. Intravenous antibiotics were administered  preoperatively. Sequential compression devices were applied to the patient's lower extremities preoperatively.  Surgical time-out was performed, where the patient was identified and procedures confirmed.  An examination under anesthesia was performed with findings described as above.  The patient's abdomen, perineum, and vagina were sterilely prepped and draped. A urethra was dilated and the goldberg catheter was placed in the bladder for drainage.      A Goldberg catheter was placed into the bladder under sterile technique. Exam under anesthesia revealed stage a well suspended apex and stage rectocele. The anterior and posterior lips of the cervix were then grasped with tenaculum posteriorly and a sweetheart clamp anteriorly. The cervix was then gently dilated and the ANDREZ uterine manipulator was inserted into the uterus. Attention was then turned to the abdomen. A 10-mm incision was made in the umbilicus in a vertical fashion with a scalpel. A Veress needle was then introduced into the abdomen with appropriate placement noted by low opening pressure. The abdomen was then insufflated with CO2 gas to a pressure of 20 mmHg. The Veress needle was removed and the 11-mm trocar was introduced into the abdomen through the incision with the laparoscope to follow. The findings were noted as above. The right adnexa a 3 cm dimple cyst the left adnexa a small 1 cystic sold lesion likely a fibroma. Abdominal washings were obtained and sent to pathology.     The intraabdominal pressure was then decreased to 15 mm Hg.  Two additional incisions were made in the right lower quadrant and left lower quadrant, both 5-mm in size, and the respective trocars were introduced under direct visualization. Marcaine was injected into the skin prior to each incision. Attention was then turned to the uterus, which was elevated. The right ovary and fallopian tube were grasped and elevated. The course of the right ureter was identified, which was well  away from the surgical site. The right infundibulopelvic ligament was then grasped with a 5-mm LigaSure, coagulated, and cut in several success steps, followed by transection of the round ligament. The vesicouterine peritoneum was then cut using the ligasure to start the bladder flap. The bladder was densely adhered to the cervix and lower uterine segment likely from the old hysterotomy scare.  The right uterine vessels were grasped with the LigaSure perpendicular to the cervix and at the level of the internal cervical os, coagulated, and cut in several steps with hemostasis noted. Attention was then turned to the left side of the uterus and the same was done. The left ovary and fallopian tube were grasped and elevated. The course of the left ureter was identified, and the left round ligament grasped with the LigaSure, coagulated, and cut in several successive steps, followed by transection of the round ligament and again attemped development of the bladder flap . The bladder was densely adhered to the lower uterine segment. Monopolar scissors were used to cold cut the adhesions. We back filled the bladder with 350 mL of sterile water to better see the reflection of the bladder on the lower uterine segment. Once the bladder edge was determined the bladder was emptied and we continued to work with cold cut using the monopolar scissor to release the adhesion. Total of 50 minutes of lysis of adhesions and to develop a plane which allowed for movement and dissection of the bladder off the anterior aspect of the cervix and lower uterine segment.  The left uterine vessels were then grasped with the LigaSure perpendicular to the cervix and at the level of the internal cervical os, coagulated, and cut in several steps with hemostasis noted. Once the bladder was dissected well away from the anterior vaginal wall. The Chaitanya cup was felt and the vaginal occluder was inflated and colpotomy was performed using the monopolar  endoshear laparoscopic scissors.  Once the colpotomy was completed the uterus, cervix, bialteral tubes and ovaries were pulled through the vagina and sent to pathology. The vagina was reapproximated with 0 V-lock suture in a running fashion  incorporating the uterosacral ligaments bilaterally.  The apex was well suspended therefore no additional apical suspension was necessary.      Finally, we performed the enterocele repair, posterior colporrhaphy and perineorrhaphy.  The posterior vaginal mucosa was then incised in the midline with Metzenbaum scissors.  The left and right margins of the vaginal mucosa were grasped with Allis clamps and the vaginal mucosa was dissected sharply off of the underlying rectovaginal fibromuscular connective tissue. The rectovaginal fibromuscular tissue was exposed bilaterally to the margins of the levator ani muscles.  At this time a high enterocele sac was identified. We proceeded to isolate and repair the enterocele. This was begun with care to stay very superficial and develop the proper plane. Care taken here will prevent early entry into the peritoneal cavity. The dissection of the enterocele is continued all the way to the neck of the enterocele sac. After the enterocele has been completely isolated, the enterocele sac is reduced and closed.   A 0 Vicryl suture was placed through the peritoneum and into the prerectal fascia that overlies the rectum. A circumferential closure of the defect is then performed.  Individual areas of bleeding were made hemostatic with the electrocautery.  The rectovaginal fibromuscular tissue was exposed bilaterally to the margins of the levator ani muscles and plicated in the midline with a series of vertical mattress stitches of 0 Vicryl, bringing together the perineal body in the midline between the hymenal remnants.  A series of vertical mattress stitches were used to plicate the perineal body beneath the perineal skin thereby plicating the  superficial and deep transverse perineal muscles and bulbocavernosus muscles.  Care was taken to make sure that vaginal caliber/ introitus allowed 2 - 3 fingerbreadths to be placed without difficulty. There was no significant posterior vaginal wall contracture/ ridging at the completion of the plication. The bed of the repair was copiously irrigated. The vaginal mucosal margins of the vertical incision were trimmed with Metzenbaum scissors on each side and reapproximated with a running, locking stitch of 2-0 Vicryl.  Excellent hemostasis was observed along the suture line. Vaginal exam confirmed good reinforcement of the rectocele site without ridging or significant contracture of the vault.  Rectovaginal examination was performed with findings as described above. That this point we changed gowns and gloves and turned our attention to the abdomen. We turned the gas on the placed the patient in steep [place]. The pelvis was evaluated and noted to have good hemostasis.  The gas was turned off and the all instruments were removed from the abdomen. The umbilical nodule was grasped with an echo and elleptical incision made. The umbilical fashion  was reapproximated with 0 vicryl on a UR-6 needle.  The skin were closed Closed with 4.0 Monocryl suture.     The patient tolerated the procedure well. Needle, sponge lap, and instrument counts were correct x2. The patient was transferred to recovery in stable condition.    Dr. Pinky Ko  was present and participated in the entire procedure.    DICTATED BY Pinky Ko, DO    _________________________

## 2018-12-11 ENCOUNTER — TELEPHONE (OUTPATIENT)
Dept: UROGYNECOLOGY | Facility: CLINIC | Age: 58
End: 2018-12-11

## 2018-12-11 VITALS
OXYGEN SATURATION: 95 % | WEIGHT: 154.56 LBS | RESPIRATION RATE: 18 BRPM | TEMPERATURE: 98 F | BODY MASS INDEX: 28.44 KG/M2 | DIASTOLIC BLOOD PRESSURE: 79 MMHG | HEIGHT: 62 IN | HEART RATE: 90 BPM | SYSTOLIC BLOOD PRESSURE: 125 MMHG

## 2018-12-11 LAB
BASOPHILS # BLD AUTO: 0.05 K/UL
BASOPHILS NFR BLD: 0.4 %
DIFFERENTIAL METHOD: ABNORMAL
EOSINOPHIL # BLD AUTO: 0.1 K/UL
EOSINOPHIL NFR BLD: 0.4 %
ERYTHROCYTE [DISTWIDTH] IN BLOOD BY AUTOMATED COUNT: 12.8 %
HCT VFR BLD AUTO: 36.7 %
HGB BLD-MCNC: 11.8 G/DL
LYMPHOCYTES # BLD AUTO: 3 K/UL
LYMPHOCYTES NFR BLD: 27.1 %
MCH RBC QN AUTO: 30.3 PG
MCHC RBC AUTO-ENTMCNC: 32.2 G/DL
MCV RBC AUTO: 94 FL
MONOCYTES # BLD AUTO: 0.9 K/UL
MONOCYTES NFR BLD: 8.2 %
NEUTROPHILS # BLD AUTO: 7.1 K/UL
NEUTROPHILS NFR BLD: 63.7 %
PLATELET # BLD AUTO: 275 K/UL
PMV BLD AUTO: 9.1 FL
RBC # BLD AUTO: 3.89 M/UL
WBC # BLD AUTO: 11.21 K/UL

## 2018-12-11 PROCEDURE — 36415 COLL VENOUS BLD VENIPUNCTURE: CPT

## 2018-12-11 PROCEDURE — 63600175 PHARM REV CODE 636 W HCPCS: Performed by: STUDENT IN AN ORGANIZED HEALTH CARE EDUCATION/TRAINING PROGRAM

## 2018-12-11 PROCEDURE — 25000003 PHARM REV CODE 250: Performed by: STUDENT IN AN ORGANIZED HEALTH CARE EDUCATION/TRAINING PROGRAM

## 2018-12-11 PROCEDURE — 88305 TISSUE EXAM BY PATHOLOGIST: CPT | Performed by: PATHOLOGY

## 2018-12-11 PROCEDURE — 85025 COMPLETE CBC W/AUTO DIFF WBC: CPT

## 2018-12-11 RX ORDER — HYDROCODONE BITARTRATE AND ACETAMINOPHEN 5; 325 MG/1; MG/1
1 TABLET ORAL EVERY 6 HOURS PRN
Qty: 20 TABLET | Refills: 0 | Status: SHIPPED | OUTPATIENT
Start: 2018-12-11 | End: 2019-01-16

## 2018-12-11 RX ORDER — IBUPROFEN 800 MG/1
800 TABLET ORAL EVERY 8 HOURS PRN
Qty: 30 TABLET | Refills: 1 | OUTPATIENT
Start: 2018-12-11 | End: 2021-04-06

## 2018-12-11 RX ADMIN — THYROID, PORCINE 90 MG: 30 TABLET ORAL at 05:12

## 2018-12-11 RX ADMIN — HYDROCODONE BITARTRATE AND ACETAMINOPHEN 1 TABLET: 10; 325 TABLET ORAL at 02:12

## 2018-12-11 RX ADMIN — MUPIROCIN 1 G: 20 OINTMENT TOPICAL at 10:12

## 2018-12-11 RX ADMIN — IBUPROFEN 600 MG: 600 TABLET ORAL at 12:12

## 2018-12-11 RX ADMIN — IBUPROFEN 600 MG: 600 TABLET ORAL at 05:12

## 2018-12-11 RX ADMIN — HYDROCODONE BITARTRATE AND ACETAMINOPHEN 1 TABLET: 5; 325 TABLET ORAL at 04:12

## 2018-12-11 RX ADMIN — ENOXAPARIN SODIUM 40 MG: 100 INJECTION SUBCUTANEOUS at 10:12

## 2018-12-11 RX ADMIN — DEXTROSE MONOHYDRATE, SODIUM CHLORIDE, AND POTASSIUM CHLORIDE: 50; 9; 1.49 INJECTION, SOLUTION INTRAVENOUS at 04:12

## 2018-12-11 RX ADMIN — HYDROCODONE BITARTRATE AND ACETAMINOPHEN 1 TABLET: 10; 325 TABLET ORAL at 10:12

## 2018-12-11 NOTE — PLAN OF CARE
12/11/18 1156   Discharge Assessment   Assessment Type Discharge Planning Assessment   Confirmed/corrected address and phone number on facesheet? Yes   Assessment information obtained from? Patient;Caregiver;Medical Record   Communicated expected length of stay with patient/caregiver yes   Prior to hospitilization cognitive status: Alert/Oriented   Prior to hospitalization functional status: Independent   Current cognitive status: Alert/Oriented   Current Functional Status: Assistive Equipment   Lives With alone   Able to Return to Prior Arrangements yes   Is patient able to care for self after discharge? Yes   Equipment Currently Used at Home none   Do you have any problems affording any of your prescribed medications? No   Is the patient taking medications as prescribed? yes   Does the patient have transportation home? Yes   Transportation Anticipated family or friend will provide   Discharge Plan A Home   Patient/Family in Agreement with Plan yes

## 2018-12-11 NOTE — NURSING
Very sedated Pt arrived to floor via stretcher with HA Starkey and transferred self with assistance o bed. VSS on RA and afebrile. IVF started, SCDs applied, oriented to room, call light placed within reach, bed low and locked, and family at bedside.  No complaints of pain, only nausea. Oliver noted draining clear green urine to gravity. Incisions noted to abdomen; lap sites x3, CDI. No acute distress noted at this time. Will continue to monitor.

## 2018-12-11 NOTE — PROGRESS NOTES
Progress Note  Gynecology    Admit Date: 12/10/2018  LOS: 0    Reason for Admission:  <principal problem not specified>    SUBJECTIVE:     Alma Jackson is a 58 y.o.  who is POD#1 s/p TLH with posterior repair for the treatment of abnormal uterine bleeding and rectocele.    Pt doing well this morning. Pain is well controlled. Patient complains of some discomfort of the frenulum likely secondary to anesthesia equipment placement. Discomfort is somewhat relieved by chloriseptic lozenzes. She is tolerating a regular diet without N/V. Patient endorses some nausea overnight which has resolved. Ambulating without difficulty. Voiding without difficulty via goldberg. Reports some discomfort secondary to goldberg and vaginal packing. Passing flatus. She is not yet having bowel movements.    OBJECTIVE:     Vital Signs   Temp:  [97.6 °F (36.4 °C)-99.8 °F (37.7 °C)] 98.1 °F (36.7 °C)  Pulse:  [] 90  Resp:  [16-18] 18  SpO2:  [94 %-100 %] 95 %  BP: (119-155)/(56-81) 123/79      Intake/Output Summary (Last 24 hours) at 2018 0633  Last data filed at 2018 0300  Gross per 24 hour   Intake 2700 ml   Output 1750 ml   Net 950 ml     UO: 181 cc/hr over the past 8 hours    Physical Exam:  Gen: A&Ox3, NAD  CV: RRR  Pulm: LCTAB, normal respiratory effort  Abd: active bowel sounds, soft, non-distended, non-tender to palpation without rebound or guarding  Inc: three laparoscopic port sites with bandages in place; no drainage noted  Ext: PPP, no peripheral edema, TEDs/SCDs in place  : Goldberg in place draining clear yellow urine; vaginal packing removed; appropriate drainage on packing     Laboratory:  Recent Results (from the past 24 hour(s))   Type And Screen Preop    Collection Time: 12/10/18  7:27 AM   Result Value Ref Range    Group & Rh B POS     Indirect Jose E NEG    POCT glucose    Collection Time: 12/10/18  7:42 AM   Result Value Ref Range    POCT Glucose 84 70 - 110 mg/dL   CBC auto differential     Collection Time: 18  5:45 AM   Result Value Ref Range    WBC 11.21 3.90 - 12.70 K/uL    RBC 3.89 (L) 4.00 - 5.40 M/uL    Hemoglobin 11.8 (L) 12.0 - 16.0 g/dL    Hematocrit 36.7 (L) 37.0 - 48.5 %    MCV 94 82 - 98 fL    MCH 30.3 27.0 - 31.0 pg    MCHC 32.2 32.0 - 36.0 g/dL    RDW 12.8 11.5 - 14.5 %    Platelets 275 150 - 350 K/uL    MPV 9.1 (L) 9.2 - 12.9 fL    Gran # (ANC) 7.1 1.8 - 7.7 K/uL    Lymph # 3.0 1.0 - 4.8 K/uL    Mono # 0.9 0.3 - 1.0 K/uL    Eos # 0.1 0.0 - 0.5 K/uL    Baso # 0.05 0.00 - 0.20 K/uL    Gran% 63.7 38.0 - 73.0 %    Lymph% 27.1 18.0 - 48.0 %    Mono% 8.2 4.0 - 15.0 %    Eosinophil% 0.4 0.0 - 8.0 %    Basophil% 0.4 0.0 - 1.9 %    Differential Method Automated        Diagnostic Results:  n/a    ASSESSMENT/PLAN:     Active Hospital Problems    Diagnosis  POA    Cystocele with rectocele [N81.10, N81.6]  Yes    S/P laparoscopic hysterectomy, rectocele repair 12/10/18 [Z90.710]  Unknown      Resolved Hospital Problems   No resolved problems to display.       Assessment: 58 y.o.  POD#1 s/p TLH and posterior repair     Plan:   1. Post-op   - Routine post-op advances  - Continue PRN pain medications  - D/C goldberg and perform active voiding trial  - Encourage ambulation; lovenox to be administered this am   - Encourage IS  - CBC stable at 11.8/36.7  - UOP adequate: 181 cc/hr over 8 hours  - patient tolerating PO  - Antiemetics prn nausea/vomiting.    2. Hypothyroidism   - continue home armour thyroid     3. Hypertension   - BP: (119-155)/(56-81) 123/79  - home hctz held    - resume on discharge       Dispo: As patient meets appropriate post-op milestones, plan for discharge to home today.    Sangeetha Elena MD  OBGYN, PGY-1

## 2018-12-11 NOTE — NURSING
Active void trial this morning per MD order, 300 NS instilled, goldberg pulled and patient voided 300 cc. Patient ambulated to bathroom well.

## 2018-12-11 NOTE — PLAN OF CARE
Problem: Adult Inpatient Plan of Care  Goal: Plan of Care Review  Outcome: Ongoing (interventions implemented as appropriate)  VSS and afebrile, aaox4. No significant events this shift. Pt free of skin breakdowns. Goldberg to gravity draining clear yellow urine. Lap sites x3 with steri strips and bandaids CDI. Pain controlled by PO pain medication and tolerated well. Pt c/o nausea and discomfort due to goldberg. PRN anti emetic administered. Tolerating regular diet. Pt resting comfortably in bed. Plan of care reviewed with patient. Purposeful hourly rounding done. Call light at bedside, bed at lowest position, brakes on, non skid socks on. Will continue to monitor.

## 2018-12-11 NOTE — PLAN OF CARE
Problem: Adult Inpatient Plan of Care  Goal: Plan of Care Review  Outcome: Ongoing (interventions implemented as appropriate)  Patient unable to perform IS due to nausea. Placed at bedside for future instruct.

## 2018-12-11 NOTE — PLAN OF CARE
12/11/18 1158   Final Note   Assessment Type Final Discharge Note   Anticipated Discharge Disposition Home   Hospital Follow Up  Appt(s) scheduled? Yes   Discharge plans and expectations educations in teach back method with documentation complete? Yes   Right Care Referral Info   Post Acute Recommendation No Care   Referral Type See AVS

## 2018-12-11 NOTE — PLAN OF CARE
Discharge Planning:  Patient admitted on 12-10-18  LOS-day 1    Chart reviewed, Care plan discussed  Discussed care plan with treatment team,  attending Dr Worthington  Consults following are: case mgt.     PCP updated in [facility]: Dr Jara  Pharmacy, updated in [facility]: Jake in [facility]    DME at home: n/a    Current dispo: self reports no needs  Transportation: friend at the bedside    Case management  to follow

## 2018-12-11 NOTE — DISCHARGE SUMMARY
[facility]  Obstetrics & Gynecology  Discharge Summary    Patient Name: Alma Jackson  MRN: [id number]  Admission Date: 12/10/2018  Hospital Length of Stay: 0 days  Discharge Date and Time:  12/11/2018 6:48 AM  Attending Physician: Pinky Ko DO   Discharging Provider: Sangeetha Elena MD  Primary Care Provider: Hira Jara MD    Hospital Course: Patient presented for scheduled procedure. Patient was passed back to OR for scheduled TLH and posterior repair for the treatment of abnormal uterine bleeding and rectocele. Please see OP note for further details. Tolerated procedure well and patient was taken to recovery in a stable condition. Prior to discharge patient was able to void (passed active void trial), ambulate, tolerate PO and pain was well controlled with PO meds. Patient was given routine post-op instructions for which patient voiced understanding. Patient was subsequently discharged home.      Procedure(s) (LRB):  HYSTERECTOMY, TOTAL, LAPAROSCOPIC; excision of umbilical mole (N/A)  SALPINGO-OOPHORECTOMY, LAPAROSCOPIC (Bilateral)  COLPORRHAPHY, POSTERIOR (N/A)  CYSTOSCOPY (N/A)  REPAIR, ENTEROCELE (N/A)  DILATION, URETHRA (N/A)     Consults:     Significant Diagnostic Studies: Labs:   CBC   Recent Labs   Lab 12/11/18  0545   WBC 11.21   HGB 11.8*   HCT 36.7*          Pending Diagnostic Studies:     None        Final Active Diagnoses:    Diagnosis Date Noted POA    PRINCIPAL PROBLEM:  S/P laparoscopic hysterectomy, rectocele repair 12/10/18 [Z90.710] 12/10/2018 Unknown    Cystocele with rectocele [N81.10, N81.6] 12/10/2018 Yes      Problems Resolved During this Admission:        Discharged Condition: good    Disposition: Home or Self Care    Follow Up:  Follow-up Information     Pinky Ko DO In 2 weeks.    Specialties:  Gynecology, Urology  Why:  post op visit  Contact information:  [address]  [place]  [address]  [phone]             Pinky Ko, DO In 6 weeks.    Specialties:  Gynecology, Urology  Why:  post op visit  Contact information:  [address]  [place] [address]  [phone]                 Patient Instructions:      Other restrictions (specify):   Order Comments: Nothing in the vagina for six weeks     Notify your health care provider if you experience any of the following:  temperature >100.4     Notify your health care provider if you experience any of the following:  persistent nausea and vomiting or diarrhea     Notify your health care provider if you experience any of the following:  severe uncontrolled pain     Notify your health care provider if you experience any of the following:  redness, tenderness, or signs of infection (pain, swelling, redness, odor or green/yellow discharge around incision site)     Notify your health care provider if you experience any of the following:  difficulty breathing or increased cough     Notify your health care provider if you experience any of the following:  severe persistent headache     Notify your health care provider if you experience any of the following:  persistent dizziness, light-headedness, or visual disturbances     Notify your health care provider if you experience any of the following:  increased confusion or weakness     Notify your health care provider if you experience any of the following:   Order Comments: Heavy vaginal bleeding >1 pad/hour for greater than 2 hours     Activity as tolerated     Medications:  Reconciled Home Medications:      Medication List      CHANGE how you take these medications    * hydroCHLOROthiazide 25 MG tablet  Commonly known as:  HYDRODIURIL  Take 1 tablet (25 mg total) by mouth once daily.  What changed:    · when to take this  · reasons to take this     * hydroCHLOROthiazide 25 MG tablet  Commonly known as:  HYDRODIURIL  Take 25 mg by mouth.  What changed:  Another medication with the same name  was changed. Make sure you understand how and when to take each.         * This list has 2 medication(s) that are the same as other medications prescribed for you. Read the directions carefully, and ask your doctor or other care provider to review them with you.            CONTINUE taking these medications    * baclofen 10 MG tablet  Commonly known as:  LIORESAL  Take 10 mg by mouth as needed.     * baclofen 10 MG tablet  Commonly known as:  LIORESAL  Take 10 mg by mouth.     BOOSTRIX TDAP 2.5-8-5 Lf-mcg-Lf/0.5mL Syrg injection  Generic drug:  diphth,pertus(acell),tetanus  Inject into the muscle.     clonazePAM 0.5 MG tablet  Commonly known as:  KLONOPIN  Take 0.25 tablets by mouth.     * cyclobenzaprine 10 MG tablet  Commonly known as:  FLEXERIL  TK 1 T PO QHS     * cyclobenzaprine 10 MG tablet  Commonly known as:  FLEXERIL  TK 1 T PO QHS     * diltiaZEM 30 MG tablet  Commonly known as:  CARDIZEM  every 12 (twelve) hours.     * diltiaZEM 30 MG tablet  Commonly known as:  CARDIZEM  Take 30 mg by mouth.     * fluocinolone acetonide oil 0.01 % Drop  GAL AA BID     * fluocinolone acetonide oil 0.01 % Drop  GAL AA BID     * HYDROcodone-acetaminophen 5-325 mg per tablet  Commonly known as:  NORCO  TK 1 T PO  QHS PRF SEVERE NIGHT TIME PRN ACUTE PAIN     * HYDROcodone-acetaminophen 5-325 mg per tablet  Commonly known as:  NORCO  Take 1 tablet by mouth.     * nitrofurantoin (macrocrystal-monohydrate) 100 MG capsule  Commonly known as:  MACROBID  Take 1 capsule (100 mg total) by mouth 2 (two) times daily.     * nitrofurantoin (macrocrystal-monohydrate) 100 MG capsule  Commonly known as:  MACROBID  Take 1 capsule (100 mg total) by mouth 2 (two) times daily.     nystatin-triamcinolone cream  Commonly known as:  MYCOLOG II  Apply to affected area 2 times daily     ondansetron 4 MG Tbdl  Commonly known as:  ZOFRAN-ODT  Take 1 tablet (4 mg total) by mouth every 6 (six) hours as needed.     phenazopyridine 200 MG tablet  Commonly  known as:  PYRIDIUM  Take 200 mg by mouth.     * thyroid (pork) 90 mg Tab  Commonly known as:  ARMOUR THYROID  Take 1 tablet (90 mg total) by mouth before breakfast.     * ARMOUR THYROID 90 mg Tab  Generic drug:  thyroid (pork)  Take 1 tablet by mouth.         * This list has 14 medication(s) that are the same as other medications prescribed for you. Read the directions carefully, and ask your doctor or other care provider to review them with you.                Sangeetha Elena MD  Obstetrics & Gynecology  [facility]

## 2018-12-12 ENCOUNTER — PATIENT MESSAGE (OUTPATIENT)
Dept: UROGYNECOLOGY | Facility: CLINIC | Age: 58
End: 2018-12-12

## 2018-12-14 DIAGNOSIS — E89.0 POSTABLATIVE HYPOTHYROIDISM: ICD-10-CM

## 2018-12-14 RX ORDER — THYROID, PORCINE 90 MG/1
TABLET ORAL
Qty: 90 TABLET | Refills: 0 | Status: SHIPPED | OUTPATIENT
Start: 2018-12-14 | End: 2019-04-20 | Stop reason: SDUPTHER

## 2018-12-19 ENCOUNTER — LAB VISIT (OUTPATIENT)
Dept: LAB | Facility: OTHER | Age: 58
End: 2018-12-19
Attending: INTERNAL MEDICINE
Payer: COMMERCIAL

## 2018-12-19 ENCOUNTER — CLINICAL SUPPORT (OUTPATIENT)
Dept: OBSTETRICS AND GYNECOLOGY | Facility: CLINIC | Age: 58
End: 2018-12-19
Payer: COMMERCIAL

## 2018-12-19 DIAGNOSIS — E89.0 POSTABLATIVE HYPOTHYROIDISM: ICD-10-CM

## 2018-12-19 LAB — TSH SERPL DL<=0.005 MIU/L-ACNC: 1.85 UIU/ML

## 2018-12-19 PROCEDURE — 36415 COLL VENOUS BLD VENIPUNCTURE: CPT

## 2018-12-19 PROCEDURE — 99999 PR PBB SHADOW E&M-EST. PATIENT-LVL III: CPT | Mod: PBBFAC,,,

## 2018-12-19 PROCEDURE — 84443 ASSAY THYROID STIM HORMONE: CPT

## 2018-12-19 PROCEDURE — 96372 THER/PROPH/DIAG INJ SC/IM: CPT | Mod: S$GLB,,, | Performed by: OBSTETRICS & GYNECOLOGY

## 2018-12-19 RX ADMIN — TESTOSTERONE CYPIONATE 50 MG: 200 INJECTION, SOLUTION INTRAMUSCULAR at 01:12

## 2018-12-20 ENCOUNTER — OFFICE VISIT (OUTPATIENT)
Dept: UROGYNECOLOGY | Facility: CLINIC | Age: 58
End: 2018-12-20
Payer: COMMERCIAL

## 2018-12-20 VITALS
WEIGHT: 150.38 LBS | HEIGHT: 62 IN | DIASTOLIC BLOOD PRESSURE: 80 MMHG | BODY MASS INDEX: 27.67 KG/M2 | SYSTOLIC BLOOD PRESSURE: 120 MMHG

## 2018-12-20 DIAGNOSIS — Z98.890 POST-OPERATIVE STATE: Primary | ICD-10-CM

## 2018-12-20 PROCEDURE — 99999 PR PBB SHADOW E&M-EST. PATIENT-LVL IV: CPT | Mod: PBBFAC,,, | Performed by: OBSTETRICS & GYNECOLOGY

## 2018-12-20 PROCEDURE — 99024 POSTOP FOLLOW-UP VISIT: CPT | Mod: S$GLB,,, | Performed by: OBSTETRICS & GYNECOLOGY

## 2018-12-20 NOTE — PROGRESS NOTES
Post Op Note     2018  Alma Jackson is a 58 y.o. female status post total laparoscopic hysterectomy for abnormal uterine bleeding and pelvic relaxation on 2018.   doing well with some problems : vaginal bleeding less than 1 pad per day.     1. Total Laparoscopic Hysterectomy  2.  Laparoscopic Bilateral Salpingo-oophorectomy  3.  Extensive Lysis of bladder to lower uterine segment (50 minutes of dissection)   4.  Enterocele repair   5.  Rectocele repair  6.  Urethral dilation   7.  Cystourethroscopy  8.  Excision of umbilical lesion      Incontinence episode: No,   OAB symptoms: Yes -   Incomplete emptying: No  Prolapse symptoms: No        Review of patient's allergies indicates:   Allergen Reactions    Azithromycin (bulk) Rash     z-pack      Past Medical History:   Diagnosis Date    Cataract     Fibromyalgia     Hx of colonic polyps     Hypertension     Hyperthyroidism     Hypothyroidism     Irregular heart beat     Ophthalmic Graves disease      Past Surgical History:   Procedure Laterality Date    CANTHOPLASTY Bilateral 2012    Performed by Rodo Carreon [facility] at [facility] OR 1ST FLR     SECTION, CLASSIC      COLONOSCOPY N/A 2016    Procedure: COLONOSCOPY;  Surgeon: Benny Menchaca MD;  Location: [facility] (4TH FLR);  Service: Endoscopy;  Laterality: N/A;  requests Dr Menchaca    COLONOSCOPY N/A 2016    Performed by Benny Menchaca MD at [facility] ENDO (4TH FLR)    COLPORRHAPHY, POSTERIOR N/A 12/10/2018    Performed by Pinky Ko DO at [facility] OR    CYSTOSCOPY N/A 12/10/2018    Procedure: CYSTOSCOPY;  Surgeon: Pinky Ko DO;  Location: [facility] OR;  Service: OB/GYN;  Laterality: N/A;    CYSTOSCOPY N/A 12/10/2018    Performed by Pinky Ko DO at [facility] OR    DECOMPRESSION, ORBIT Bilateral 2012    Performed by Rodo Carreon MD at [facility] OR 1ST FLR    DILATION OF URETHRA N/A 12/10/2018    Procedure: DILATION, URETHRA;  Surgeon:  Pinky Ko DO;  Location: [facility] OR;  Service: OB/GYN;  Laterality: N/A;    DILATION, URETHRA N/A 12/10/2018    Performed by Pinky Ko DO at [facility] OR    ENTEROCELE REPAIR N/A 12/10/2018    Procedure: REPAIR, ENTEROCELE;  Surgeon: Pinky Ko DO;  Location: [facility] OR;  Service: OB/GYN;  Laterality: N/A;    EYE SURGERY      HYSTERECTOMY, TOTAL, LAPAROSCOPIC; excision of umbilical mole N/A 12/10/2018    Performed by Pinky Ko DO at [facility] OR    LAPAROSCOPIC SALPINGO-OOPHORECTOMY Bilateral 12/10/2018    Procedure: SALPINGO-OOPHORECTOMY, LAPAROSCOPIC;  Surgeon: Pinky Ko DO;  Location: [facility] OR;  Service: OB/GYN;  Laterality: Bilateral;    LAPAROSCOPIC TOTAL HYSTERECTOMY N/A 12/10/2018    Procedure: HYSTERECTOMY, TOTAL, LAPAROSCOPIC; excision of umbilical mole;  Surgeon: Pinky Ko DO;  Location: [facility] OR;  Service: OB/GYN;  Laterality: N/A;    lasik ou      POSTERIOR REPAIR N/A 12/10/2018    Procedure: COLPORRHAPHY, POSTERIOR;  Surgeon: Pinky Ko DO;  Location: [facility] OR;  Service: OB/GYN;  Laterality: N/A;    REPAIR, ENTEROCELE N/A 12/10/2018    Performed by Pinky Ko DO at [facility] OR    REPAIR, RETRACTION, EYELID Bilateral 11/29/2012    Performed by [facility] MD at [facility] OR 1ST FLR    SALPINGO-OOPHORECTOMY, LAPAROSCOPIC Bilateral 12/10/2018    Performed by Pinky Ko DO at [facility] OR    thyroid radiation      TUBAL LIGATION       Family History   Problem Relation Age of Onset    Heart failure Mother     Thyroid disease Mother     Stroke Mother     Parkinsonism Father     Hypertension Maternal Grandmother     Stroke Maternal Grandmother     Diabetes Maternal Grandfather     Stroke Paternal Grandfather     Amblyopia Neg Hx     Blindness Neg Hx     Cancer Neg Hx     Cataracts Neg Hx     Glaucoma Neg Hx     Macular degeneration Neg Hx     Retinal detachment Neg Hx     Strabismus Neg Hx      Breast cancer Neg Hx     Colon cancer Neg Hx     Ovarian cancer Neg Hx        Current Outpatient Medications on File Prior to Visit   Medication Sig Dispense Refill    ARMOUR THYROID 90 mg Tab TAKE 1 TABLET(90 MG) BY MOUTH BEFORE BREAKFAST 90 tablet 0    baclofen (LIORESAL) 10 MG tablet Take 10 mg by mouth as needed.  1    baclofen (LIORESAL) 10 MG tablet Take 10 mg by mouth.      clonazePAM (KLONOPIN) 0.5 MG tablet Take 0.25 tablets by mouth.      cyclobenzaprine (FLEXERIL) 10 MG tablet TK 1 T PO QHS  1    cyclobenzaprine (FLEXERIL) 10 MG tablet TK 1 T PO QHS      diltiaZEM (CARDIZEM) 30 MG tablet every 12 (twelve) hours.   3    diltiaZEM (CARDIZEM) 30 MG tablet Take 30 mg by mouth.      diphth,pertus,acell,,tetanus (BOOSTRIX) 2.5-8-5 Lf-mcg-Lf/0.5mL Syrg injection Inject into the muscle. 0.5 mL 0    fluocinolone acetonide oil 0.01 % Drop GAL AA BID  2    fluocinolone acetonide oil 0.01 % Drop GAL AA BID      hydrochlorothiazide (HYDRODIURIL) 25 MG tablet Take 1 tablet (25 mg total) by mouth once daily. (Patient taking differently: Take 25 mg by mouth as needed. ) 90 tablet 3    hydroCHLOROthiazide (HYDRODIURIL) 25 MG tablet Take 25 mg by mouth.      HYDROcodone-acetaminophen (NORCO) 5-325 mg per tablet Take 1 tablet by mouth every 6 (six) hours as needed for Pain. 20 tablet 0    hydrocodone-acetaminophen 5-325mg (NORCO) 5-325 mg per tablet TK 1 T PO  QHS PRF SEVERE NIGHT TIME PRN ACUTE PAIN  0    ibuprofen (ADVIL,MOTRIN) 800 MG tablet Take 1 tablet (800 mg total) by mouth every 8 (eight) hours as needed for Pain. 30 tablet 1    nitrofurantoin, macrocrystal-monohydrate, (MACROBID) 100 MG capsule Take 1 capsule (100 mg total) by mouth 2 (two) times daily. 14 capsule 0    nitrofurantoin, macrocrystal-monohydrate, (MACROBID) 100 MG capsule Take 1 capsule (100 mg total) by mouth 2 (two) times daily. 14 capsule 0    nystatin-triamcinolone (MYCOLOG II) cream Apply to affected area 2 times daily       ondansetron (ZOFRAN-ODT) 4 MG TbDL Take 1 tablet (4 mg total) by mouth every 6 (six) hours as needed. 20 tablet 0    phenazopyridine (PYRIDIUM) 200 MG tablet Take 200 mg by mouth.      thyroid, pork, (ARMOUR THYROID) 90 mg Tab Take 1 tablet by mouth.       Current Facility-Administered Medications on File Prior to Visit   Medication Dose Route Frequency Provider Last Rate Last Dose    estradiol cypionate 5 mg/mL injection 5 mg  5 mg Intramuscular Q28 Days Ruby Villegas MD   5 mg at 12/19/18 1318    testosterone cypionate injection 50 mg  50 mg Intramuscular Q28 Days Ruby Villegas MD   50 mg at 12/19/18 1318         PE  There were no vitals filed for this visit.  Lung:clear to auscultation and percussion, no chest deformities noted  Heart:RRR, normal S1 & S2, no murmurs, rubs, or gallops, 2+ peripheral pulses  Abdomen: BS normal.  Abdomen soft, non-tender.  No masses or organomegaly  Incision: healing well.  Extremities: normal.  Vagina: normal vagina, suture in place     POP-Q:     Aa                             Ba                             C                            GH                            PB                             TVL                        Ap                             Bp                             D                              PVR:  mL  UA: normal      Operative findings again reviewed. Pathology report discussed.    Impression: Normal post-operative course.    Plan: Follow up visit in in 4 weeks. Anticipated return   to work 4 weeks.

## 2018-12-31 NOTE — PROGRESS NOTES
Post Op Note     2018  Alma Jackson is a 58 y.o. female status post total laparoscopic hysterectomy for abnormal uterine bleeding and pelvic relaxation on 2018.   doing well with some problems : vaginal bleeding less than 1 pad per day.     1. Total Laparoscopic Hysterectomy  2.  Laparoscopic Bilateral Salpingo-oophorectomy  3.  Extensive Lysis of bladder to lower uterine segment (50 minutes of dissection)   4.  Enterocele repair   5.  Rectocele repair  6.  Urethral dilation   7.  Cystourethroscopy  8.  Excision of umbilical lesion      Incontinence episode: No,   OAB symptoms: Yes -   Incomplete emptying: No  Prolapse symptoms: No        Review of patient's allergies indicates:   Allergen Reactions    Azithromycin (bulk) Rash     z-pack      Past Medical History:   Diagnosis Date    Cataract     Fibromyalgia     Hx of colonic polyps     Hypertension     Hyperthyroidism     Hypothyroidism     Irregular heart beat     Ophthalmic Graves disease      Past Surgical History:   Procedure Laterality Date    CANTHOPLASTY Bilateral 2012    Performed by [facility] at [facility] OR 1ST FLR     SECTION, CLASSIC      COLONOSCOPY N/A 2016    Procedure: COLONOSCOPY;  Surgeon: Benny Menchaca MD;  Location: [facility] (4TH FLR);  Service: Endoscopy;  Laterality: N/A;  requests Dr Menchaca    COLONOSCOPY N/A 2016    Performed by Benny [facility]chaca MD at [facility] ENDO (4TH FLR)    COLPORRHAPHY, POSTERIOR N/A 12/10/2018    Per[facility] by [facility] OR    CYSTOSCOPY N/A 12/10/2018    Procedure: CYSTOSCOPY;  Surgeon: Pinky Ko DO;  Location: [facility] OR;  Service: OB/GYN;  Laterality: N/A;    CYSTOSCOPY N/A 12/10/2018    Performed by Pinky Ko DO at [facility] OR    DECOMPRESSION, ORBIT Bilateral 2012    Performed by [facility]reon MD at [facility] OR 1ST FLR    DILATION OF URETHRA N/A 12/10/2018    Procedure: DILATION, URETHRA;  Surgeon:  Pinky Ko DO;  Location: [facility] OR;  Service: OB/GYN;  Laterality: N/A;    DILATION, URETHRA N/A 12/10/2018    Performed by Pinky Ko DO at [facility] OR    ENTEROCELE REPAIR N/A 12/10/2018    Procedure: REPAIR, ENTEROCELE;  Surgeon: Pinky Ko DO;  Location: [facility] OR;  Service: OB/GYN;  Laterality: N/A;    EYE SURGERY      HYSTERECTOMY, TOTAL, LAPAROSCOPIC; excision of umbilical mole N/A 12/10/2018    Performed by Pinky Ko DO at [facility] OR    LAPAROSCOPIC SALPINGO-OOPHORECTOMY Bilateral 12/10/2018    Procedure: SALPINGO-OOPHORECTOMY, LAPAROSCOPIC;  Surgeon: Pinky Ko DO;  Location: [facility] OR;  Service: OB/GYN;  Laterality: Bilateral;    LAPAROSCOPIC TOTAL HYSTERECTOMY N/A 12/10/2018    Procedure: HYSTERECTOMY, TOTAL, LAPAROSCOPIC; excision of umbilical mole;  Surgeon: Pinky Ko DO;  Location: [facility] OR;  Service: OB/GYN;  Laterality: N/A;    lasik ou      POSTERIOR REPAIR N/A 12/10/2018    Procedure: COLPORRHAPHY, POSTERIOR;  Surgeon: Pinky Ko DO;  Location: [facility] OR;  Service: OB/GYN;  Laterality: N/A;    REPAIR, ENTEROCELE N/A 12/10/2018    Performed by Pinky Ko DO at [facility] OR    REPAIR, RETRACTION, EYELID Bilateral 11/29/2012    [facility] 1ST FLR    SALPINGO-OOPHORECTOMY, LAPAROSCOPIC Bilateral 12/10/2018    Performed by Pinky Ko DO at [facility] OR    thyroid radiation      TUBAL LIGATION       Family History   Problem Relation Age of Onset    Heart failure Mother     Thyroid disease Mother     Stroke Mother     Parkinsonism Father     Hypertension Maternal Grandmother     Stroke Maternal Grandmother     Diabetes Maternal Grandfather     Stroke Paternal Grandfather     Amblyopia Neg Hx     Blindness Neg Hx     Cancer Neg Hx     Cataracts Neg Hx     Glaucoma Neg Hx     Macular degeneration Neg Hx     Retinal detachment Neg Hx     Strabismus Neg Hx      Breast cancer Neg Hx     Colon cancer Neg Hx     Ovarian cancer Neg Hx        Current Outpatient Medications on File Prior to Visit   Medication Sig Dispense Refill    ARMOUR THYROID 90 mg Tab TAKE 1 TABLET(90 MG) BY MOUTH BEFORE BREAKFAST 90 tablet 0    baclofen (LIORESAL) 10 MG tablet Take 10 mg by mouth as needed.  1    baclofen (LIORESAL) 10 MG tablet Take 10 mg by mouth.      clonazePAM (KLONOPIN) 0.5 MG tablet Take 0.25 tablets by mouth.      cyclobenzaprine (FLEXERIL) 10 MG tablet TK 1 T PO QHS  1    cyclobenzaprine (FLEXERIL) 10 MG tablet TK 1 T PO QHS      diltiaZEM (CARDIZEM) 30 MG tablet every 12 (twelve) hours.   3    diltiaZEM (CARDIZEM) 30 MG tablet Take 30 mg by mouth.      diphth,pertus,acell,,tetanus (BOOSTRIX) 2.5-8-5 Lf-mcg-Lf/0.5mL Syrg injection Inject into the muscle. 0.5 mL 0    fluocinolone acetonide oil 0.01 % Drop GAL AA BID  2    fluocinolone acetonide oil 0.01 % Drop GAL AA BID      hydrochlorothiazide (HYDRODIURIL) 25 MG tablet Take 1 tablet (25 mg total) by mouth once daily. (Patient taking differently: Take 25 mg by mouth as needed. ) 90 tablet 3    hydroCHLOROthiazide (HYDRODIURIL) 25 MG tablet Take 25 mg by mouth.      HYDROcodone-acetaminophen (NORCO) 5-325 mg per tablet Take 1 tablet by mouth every 6 (six) hours as needed for Pain. 20 tablet 0    hydrocodone-acetaminophen 5-325mg (NORCO) 5-325 mg per tablet TK 1 T PO  QHS PRF SEVERE NIGHT TIME PRN ACUTE PAIN  0    ibuprofen (ADVIL,MOTRIN) 800 MG tablet Take 1 tablet (800 mg total) by mouth every 8 (eight) hours as needed for Pain. 30 tablet 1    nitrofurantoin, macrocrystal-monohydrate, (MACROBID) 100 MG capsule Take 1 capsule (100 mg total) by mouth 2 (two) times daily. 14 capsule 0    nitrofurantoin, macrocrystal-monohydrate, (MACROBID) 100 MG capsule Take 1 capsule (100 mg total) by mouth 2 (two) times daily. 14 capsule 0    nystatin-triamcinolone (MYCOLOG II) cream Apply to affected area 2 times daily       ondansetron (ZOFRAN-ODT) 4 MG TbDL Take 1 tablet (4 mg total) by mouth every 6 (six) hours as needed. 20 tablet 0    phenazopyridine (PYRIDIUM) 200 MG tablet Take 200 mg by mouth.      thyroid, pork, (ARMOUR THYROID) 90 mg Tab Take 1 tablet by mouth.       Current Facility-Administered Medications on File Prior to Visit   Medication Dose Route Frequency Provider Last Rate Last Dose    estradiol cypionate 5 mg/mL injection 5 mg  5 mg Intramuscular Q28 Days Ruby Villegas MD   5 mg at 12/19/18 1318    testosterone cypionate injection 50 mg  50 mg Intramuscular Q28 Days Ruby Villegas MD   50 mg at 12/19/18 1318         PE  There were no vitals filed for this visit.  Lung:clear to auscultation and percussion, no chest deformities noted  Heart:RRR, normal S1 & S2, no murmurs, rubs, or gallops, 2+ peripheral pulses  Abdomen: BS normal.  Abdomen soft, non-tender.  No masses or organomegaly  Incision: healing well.  Extremities: normal.  Vagina: normal vagina, suture in place     POP-Q:     Aa                             Ba                             C                            GH                            PB                             TVL                        Ap                             Bp                             D                              PVR:  mL  UA: normal      Operative findings again reviewed. Pathology report discussed.    Impression: Normal post-operative course.    Plan: Follow up visit in in 4 weeks. Anticipated return   to work 4 weeks.

## 2019-01-16 ENCOUNTER — CLINICAL SUPPORT (OUTPATIENT)
Dept: OBSTETRICS AND GYNECOLOGY | Facility: CLINIC | Age: 59
End: 2019-01-16
Payer: COMMERCIAL

## 2019-01-16 ENCOUNTER — OFFICE VISIT (OUTPATIENT)
Dept: UROGYNECOLOGY | Facility: CLINIC | Age: 59
End: 2019-01-16
Payer: COMMERCIAL

## 2019-01-16 VITALS
SYSTOLIC BLOOD PRESSURE: 120 MMHG | DIASTOLIC BLOOD PRESSURE: 80 MMHG | BODY MASS INDEX: 27.67 KG/M2 | HEIGHT: 62 IN | WEIGHT: 150.38 LBS

## 2019-01-16 DIAGNOSIS — R20.0 NUMBNESS AND TINGLING: ICD-10-CM

## 2019-01-16 DIAGNOSIS — R20.2 NUMBNESS AND TINGLING: ICD-10-CM

## 2019-01-16 DIAGNOSIS — Z98.890 POST-OPERATIVE STATE: Primary | ICD-10-CM

## 2019-01-16 PROCEDURE — 96372 THER/PROPH/DIAG INJ SC/IM: CPT | Mod: S$GLB,,, | Performed by: OBSTETRICS & GYNECOLOGY

## 2019-01-16 PROCEDURE — 99999 PR PBB SHADOW E&M-EST. PATIENT-LVL IV: ICD-10-PCS | Mod: PBBFAC,,, | Performed by: NURSE PRACTITIONER

## 2019-01-16 PROCEDURE — 99999 PR PBB SHADOW E&M-EST. PATIENT-LVL IV: CPT | Mod: PBBFAC,,, | Performed by: NURSE PRACTITIONER

## 2019-01-16 PROCEDURE — 96372 PR INJECTION,THERAP/PROPH/DIAG2ST, IM OR SUBCUT: ICD-10-PCS | Mod: S$GLB,,, | Performed by: OBSTETRICS & GYNECOLOGY

## 2019-01-16 PROCEDURE — 99024 PR POST-OP FOLLOW-UP VISIT: ICD-10-PCS | Mod: S$GLB,,, | Performed by: NURSE PRACTITIONER

## 2019-01-16 PROCEDURE — 99024 POSTOP FOLLOW-UP VISIT: CPT | Mod: S$GLB,,, | Performed by: NURSE PRACTITIONER

## 2019-01-16 PROCEDURE — 99999 PR PBB SHADOW E&M-EST. PATIENT-LVL III: ICD-10-PCS | Mod: PBBFAC,,,

## 2019-01-16 PROCEDURE — 99999 PR PBB SHADOW E&M-EST. PATIENT-LVL III: CPT | Mod: PBBFAC,,,

## 2019-01-16 RX ADMIN — TESTOSTERONE CYPIONATE 50 MG: 200 INJECTION, SOLUTION INTRAMUSCULAR at 10:01

## 2019-01-16 NOTE — PATIENT INSTRUCTIONS
1. Post op healing well-- no lifting > 50 pounds without assitance. No intercourse x 4 more weeks  2. Neurology referral  3. RTC 4 weeks

## 2019-01-16 NOTE — PROGRESS NOTES
Post Op Note     2018  Alma Jackson is a 58 y.o. female status post total laparoscopic hysterectomy for abnormal uterine bleeding and pelvic relaxation on 2018.   doing well with some problems :2 weeks ago had facial numbness, tingling in all 4 extremities, ?white heads on her scalp, and facial flushing x 1 week.  Still has numbness in lower lip.  Not relieved with benadryl or claritin    1. Total Laparoscopic Hysterectomy  2.  Laparoscopic Bilateral Salpingo-oophorectomy  3.  Extensive Lysis of bladder to lower uterine segment (50 minutes of dissection)   4.  Enterocele repair   5.  Rectocele repair  6.  Urethral dilation   7.  Cystourethroscopy  8.  Excision of umbilical lesion      Incontinence episode: ADDISON with sneeze-- no difference since surgery,   OAB symptoms: No   Incomplete emptying: No  Prolapse symptoms: No        Review of patient's allergies indicates:   Allergen Reactions    Azithromycin (bulk) Rash     z-pack      Past Medical History:   Diagnosis Date    Cataract     Fibromyalgia     Hx of colonic polyps     Hypertension     Hyperthyroidism     Hypothyroidism     Irregular heart beat     Ophthalmic Graves disease      Past Surgical History:   Procedure Laterality Date    CANTHOPLASTY Bilateral 2012    Performed by [facility] at [facility] OR 1ST FLR     SECTION, CLASSIC      COLONOSCOPY N/A 2016    Procedure: COLONOSCOPY;  Surgeon: Benny Menchaca MD;  Location: [facility] ([address] [place] [address]);  Service: Endoscopy;  Laterality: N/A;  requests Dr Menchaca    COLONOSCOPY N/A 2016    [facility]    COLPORRHAPHY, POSTERIOR N/A 12/10/2018    [facility] Pinky Ko [facility] OR    CYSTOSCOPY N/A 12/10/2018    Procedure: CYSTOSCOPY;  Surgeon: Pinky Ko DO;  Location[facility] OR;  Service: OB/GYN;  Laterality: N/A;    CYSTOSCOPY N/A 12/10/2018    Performed by [facility]y Ko DO at  [facility] OR    DECOMPRESSION, ORBIT Bilateral 11/29/2012    Performed by [facility] at [facility] OR 1ST FLR    DILATION OF URETHRA N/A 12/10/2018    Procedure: DILATION, URETHRA;  Surgeon: Pinky Ko DO;  Location: [facility] OR;  Service: OB/GYN;  Laterality: N/A;    DILATION, URETHRA N/A 12/10/2018    Performed by Pinky Ko DO at [facility] OR    ENTEROCELE REPAIR N/A 12/10/2018    Procedure: REPAIR, ENTEROCELE;  Surgeon: Pinky Ko DO;  Location: [facility] OR;  Service: OB/GYN;  Laterality: N/A;    EYE SURGERY      HYSTERECTOMY, TOTAL, LAPAROSCOPIC; excision of umbilical mole N/A 12/10/2018    Performed by Pinky Ko DO at [facility] OR    LAPAROSCOPIC SALPINGO-OOPHORECTOMY Bilateral 12/10/2018    Procedure: SALPINGO-OOPHORECTOMY, LAPAROSCOPIC;  Surgeon: Pinky Ko DO;  Location: [facility] OR;  Service: OB/GYN;  Laterality: Bilateral;    LAPAROSCOPIC TOTAL HYSTERECTOMY N/A 12/10/2018    Procedure: HYSTERECTOMY, TOTAL, LAPAROSCOPIC; excision of umbilical mole;  Surgeon: Pinky Ko DO;  Location: [facility] OR;  Service: OB/GYN;  Laterality: N/A;    lasik ou      POSTERIOR REPAIR N/A 12/10/2018    Procedure: COLPORRHAPHY, POSTERIOR;  Surgeon: Pinky Ko DO;  Location: [facility] OR;  Service: OB/GYN;  Laterality: N/A;    REPAIR, ENTEROCELE N/A 12/10/2018    Performed by Pinky Ko DO at [facility] OR    REPAIR, RETRACTION, EYELID Bilateral 11/29/2012    Performed by [facility] at [facility] OR 1ST FLR    SALPINGO-OOPHORECTOMY, LAPAROSCOPIC Bilateral 12/10/2018    Performed by [facility] Ko DO at [facility] OR    thyroid radiation      TUBAL LIGATION       Family History   Problem Relation Age of Onset    Heart failure Mother     Thyroid disease Mother     Stroke Mother     Parkinsonism Father     Hypertension Maternal Grandmother     Stroke Maternal Grandmother     Diabetes Maternal Grandfather     Stroke Paternal Grandfather      Amblyopia Neg Hx     Blindness Neg Hx     Cancer Neg Hx     Cataracts Neg Hx     Glaucoma Neg Hx     Macular degeneration Neg Hx     Retinal detachment Neg Hx     Strabismus Neg Hx     Breast cancer Neg Hx     Colon cancer Neg Hx     Ovarian cancer Neg Hx        Current Outpatient Medications on File Prior to Visit   Medication Sig Dispense Refill    ARMOUR THYROID 90 mg Tab TAKE 1 TABLET(90 MG) BY MOUTH BEFORE BREAKFAST 90 tablet 0    baclofen (LIORESAL) 10 MG tablet Take 10 mg by mouth as needed.  1    baclofen (LIORESAL) 10 MG tablet Take 10 mg by mouth.      clonazePAM (KLONOPIN) 0.5 MG tablet Take 0.25 tablets by mouth.      cyclobenzaprine (FLEXERIL) 10 MG tablet TK 1 T PO QHS  1    cyclobenzaprine (FLEXERIL) 10 MG tablet TK 1 T PO QHS      diltiaZEM (CARDIZEM) 30 MG tablet every 12 (twelve) hours.   3    diltiaZEM (CARDIZEM) 30 MG tablet Take 30 mg by mouth.      diphth,pertus,acell,,tetanus (BOOSTRIX) 2.5-8-5 Lf-mcg-Lf/0.5mL Syrg injection Inject into the muscle. 0.5 mL 0    fluocinolone acetonide oil 0.01 % Drop GAL AA BID  2    fluocinolone acetonide oil 0.01 % Drop GAL AA BID      hydrochlorothiazide (HYDRODIURIL) 25 MG tablet Take 1 tablet (25 mg total) by mouth once daily. (Patient taking differently: Take 25 mg by mouth as needed. ) 90 tablet 3    hydroCHLOROthiazide (HYDRODIURIL) 25 MG tablet Take 25 mg by mouth.      HYDROcodone-acetaminophen (NORCO) 5-325 mg per tablet Take 1 tablet by mouth every 6 (six) hours as needed for Pain. 20 tablet 0    hydrocodone-acetaminophen 5-325mg (NORCO) 5-325 mg per tablet TK 1 T PO  QHS PRF SEVERE NIGHT TIME PRN ACUTE PAIN  0    ibuprofen (ADVIL,MOTRIN) 800 MG tablet Take 1 tablet (800 mg total) by mouth every 8 (eight) hours as needed for Pain. 30 tablet 1    nitrofurantoin, macrocrystal-monohydrate, (MACROBID) 100 MG capsule Take 1 capsule (100 mg total) by mouth 2 (two) times daily. 14 capsule 0    nitrofurantoin,  macrocrystal-monohydrate, (MACROBID) 100 MG capsule Take 1 capsule (100 mg total) by mouth 2 (two) times daily. 14 capsule 0    nystatin-triamcinolone (MYCOLOG II) cream Apply to affected area 2 times daily      ondansetron (ZOFRAN-ODT) 4 MG TbDL Take 1 tablet (4 mg total) by mouth every 6 (six) hours as needed. 20 tablet 0    phenazopyridine (PYRIDIUM) 200 MG tablet Take 200 mg by mouth.      thyroid, pork, (ARMOUR THYROID) 90 mg Tab Take 1 tablet by mouth.       Current Facility-Administered Medications on File Prior to Visit   Medication Dose Route Frequency Provider Last Rate Last Dose    estradiol cypionate 5 mg/mL injection 5 mg  5 mg Intramuscular Q28 Days Ruby Villegas MD   5 mg at 12/19/18 1318    testosterone cypionate injection 50 mg  50 mg Intramuscular Q28 Days Ruby Villegas MD   50 mg at 12/19/18 1318         PE  There were no vitals filed for this visit.  Lung:clear to auscultation and percussion, no chest deformities noted  Heart:RRR, normal S1 & S2, no murmurs, rubs, or gallops, 2+ peripheral pulses  Abdomen: BS normal.  Abdomen soft, non-tender.  No masses or organomegaly  Incision: healing well.  Extremities: normal.  Vagina: normal vagina, incisions healing well. Few sutures on posterior incisions near perineal body.    Dr. Ko present during exam    1. Post-operative state     2. Numbness and tingling  Ambulatory consult to Neurology     Plan:  1. Post op healing well-- no lifting > 50 pounds without assitance. No intercourse x 4 more weeks  2. Neurology referral  3. RTC 4 weeks

## 2019-02-06 ENCOUNTER — LAB VISIT (OUTPATIENT)
Dept: LAB | Facility: HOSPITAL | Age: 59
End: 2019-02-06
Attending: INTERNAL MEDICINE
Payer: COMMERCIAL

## 2019-02-06 DIAGNOSIS — E89.0 POSTABLATIVE HYPOTHYROIDISM: ICD-10-CM

## 2019-02-06 LAB
T4 FREE SERPL-MCNC: 1.03 NG/DL
TSH SERPL DL<=0.005 MIU/L-ACNC: 0.08 UIU/ML

## 2019-02-06 PROCEDURE — 84443 ASSAY THYROID STIM HORMONE: CPT

## 2019-02-06 PROCEDURE — 36415 COLL VENOUS BLD VENIPUNCTURE: CPT | Mod: PO

## 2019-02-06 PROCEDURE — 84439 ASSAY OF FREE THYROXINE: CPT

## 2019-02-13 ENCOUNTER — OFFICE VISIT (OUTPATIENT)
Dept: UROGYNECOLOGY | Facility: CLINIC | Age: 59
End: 2019-02-13
Payer: COMMERCIAL

## 2019-02-13 ENCOUNTER — CLINICAL SUPPORT (OUTPATIENT)
Dept: OBSTETRICS AND GYNECOLOGY | Facility: CLINIC | Age: 59
End: 2019-02-13
Payer: COMMERCIAL

## 2019-02-13 VITALS
HEIGHT: 62 IN | SYSTOLIC BLOOD PRESSURE: 122 MMHG | DIASTOLIC BLOOD PRESSURE: 70 MMHG | BODY MASS INDEX: 28.03 KG/M2 | WEIGHT: 152.31 LBS

## 2019-02-13 DIAGNOSIS — R20.0 NUMBNESS: ICD-10-CM

## 2019-02-13 DIAGNOSIS — N39.3 STRESS INCONTINENCE IN FEMALE: ICD-10-CM

## 2019-02-13 DIAGNOSIS — M62.89 PELVIC FLOOR TENSION: Primary | ICD-10-CM

## 2019-02-13 DIAGNOSIS — Z79.890 HORMONE REPLACEMENT THERAPY (HRT): Primary | ICD-10-CM

## 2019-02-13 PROCEDURE — 99999 PR PBB SHADOW E&M-EST. PATIENT-LVL IV: CPT | Mod: PBBFAC,,, | Performed by: NURSE PRACTITIONER

## 2019-02-13 PROCEDURE — 99024 PR POST-OP FOLLOW-UP VISIT: ICD-10-PCS | Mod: S$GLB,,, | Performed by: NURSE PRACTITIONER

## 2019-02-13 PROCEDURE — 99024 POSTOP FOLLOW-UP VISIT: CPT | Mod: S$GLB,,, | Performed by: NURSE PRACTITIONER

## 2019-02-13 PROCEDURE — 96372 PR INJECTION,THERAP/PROPH/DIAG2ST, IM OR SUBCUT: ICD-10-PCS | Mod: S$GLB,,, | Performed by: OBSTETRICS & GYNECOLOGY

## 2019-02-13 PROCEDURE — 99999 PR PBB SHADOW E&M-EST. PATIENT-LVL IV: ICD-10-PCS | Mod: PBBFAC,,, | Performed by: NURSE PRACTITIONER

## 2019-02-13 PROCEDURE — 96372 THER/PROPH/DIAG INJ SC/IM: CPT | Mod: S$GLB,,, | Performed by: OBSTETRICS & GYNECOLOGY

## 2019-02-13 NOTE — PROGRESS NOTES
Post Op Note     2018  Alma Jackson is a 58 y.o. female status post total laparoscopic hysterectomy for abnormal uterine bleeding and pelvic relaxation on 2018.   doing well.  + dyspareunia with intercourse. Having intermittent periods of numbess from parts of her face to her extremities-- no correlative factor or pattern. She is schedule for neurology soon, but needs to reschedule due to work conflicts.       1. Total Laparoscopic Hysterectomy  2.  Laparoscopic Bilateral Salpingo-oophorectomy  3.  Extensive Lysis of bladder to lower uterine segment (50 minutes of dissection)   4.  Enterocele repair   5.  Rectocele repair  6.  Urethral dilation   7.  Cystourethroscopy  8.  Excision of umbilical lesion      Incontinence episode: ADDISON with sneeze-- no difference since surgery,   OAB symptoms: No   Incomplete emptying: No  Prolapse symptoms: No        Review of patient's allergies indicates:   Allergen Reactions    Azithromycin (bulk) Rash     z-pack      Past Medical History:   Diagnosis Date    Cataract     Fibromyalgia     Hx of colonic polyps     Hypertension     Hyperthyroidism     Hypothyroidism     Irregular heart beat     Ophthalmic Graves disease      Past Surgical History:   Procedure Laterality Date    CANTHOPLASTY Bilateral 2012    Performed [facility] 1ST FLR     SECTION, CLASSIC      COLONOSCOPY N/A 2016    Procedure: COLONOSCOPY;  Surgeon: Benny Menchaca MD;  [facility]  Service: Endoscopy;  Laterality: N/A;  requests Dr Menchaca    COLONOSCOPY N/A 2016    Performed by Benny Menchaca MD at [facility] (4TH FLR)    COLPORRHAPHY, POSTERIOR N/A 12/10/2018    [facility] OR    CYSTOSCOPY N/A 12/10/2018    Procedure: CYSTOSCOPY;  Surgeon: Pinky Ko DO;  Location: [facility] OR;  Service: OB/GYN;  Laterality: N/A;    CYSTOSCOPY N/A 12/10/2018    Performed by  Pinky [facility] DO at [facility] OR    DECOMPRESSION, ORBIT Bilateral 11/29/2012    Performed by [facility] at [facility] OR 1ST FLR    DILATION OF URETHRA N/A 12/10/2018    Procedure: DILATION, URETHRA;  Surgeon: Pinky Ko DO;  Location: [facility] OR;  Service: OB/GYN;  Laterality: N/A;    DILATION, URETHRA N/A 12/10/2018    Performed by Pinky Ko DO at [facility] OR    ENTEROCELE REPAIR N/A 12/10/2018    Procedure: REPAIR, ENTEROCELE;  Surgeon: Pinky Ko DO;  Location: [facility] OR;  Service: OB/GYN;  Laterality: N/A;    EYE SURGERY      HYSTERECTOMY, TOTAL, LAPAROSCOPIC; excision of umbilical mole N/A 12/10/2018    Performed by Pinky Ko DO at [facility] OR    LAPAROSCOPIC SALPINGO-OOPHORECTOMY Bilateral 12/10/2018    Procedure: SALPINGO-OOPHORECTOMY, LAPAROSCOPIC;  Surgeon: Pinky Ko DO;  Location: [facility] OR;  Service: OB/GYN;  Laterality: Bilateral;    LAPAROSCOPIC TOTAL HYSTERECTOMY N/A 12/10/2018    Procedure: HYSTERECTOMY, TOTAL, LAPAROSCOPIC; excision of umbilical mole;  Surgeon: Pinky Ko DO;  Location: [facility] OR;  Service: OB/GYN;  Laterality: N/A;    lasik ou      POSTERIOR REPAIR N/A 12/10/2018    Procedure: COLPORRHAPHY, POSTERIOR;  Surgeon: Pinky Ko DO;  Location: [facility] OR;  Service: OB/GYN;  Laterality: N/A;    REPAIR, ENTEROCELE N/A 12/10/2018    Performed by Pinky Ko DO at [facility] OR    REPAIR, RETRACTION, EYELID Bilateral 11/29/2012    Performed by [facility] at [facility] OR 1ST FLR    SALPINGO-OOPHORECTOMY, LAPAROSCOPIC Bilateral 12/10/2018    Performed by Pinky Ko DO at [facility] OR    thyroid radiation      TUBAL LIGATION       Family History   Problem Relation Age of Onset    Heart failure Mother     Thyroid disease Mother     Stroke Mother     Parkinsonism Father     Hypertension Maternal Grandmother     Stroke Maternal Grandmother     Diabetes Maternal Grandfather     Stroke  Paternal Grandfather     Amblyopia Neg Hx     Blindness Neg Hx     Cancer Neg Hx     Cataracts Neg Hx     Glaucoma Neg Hx     Macular degeneration Neg Hx     Retinal detachment Neg Hx     Strabismus Neg Hx     Breast cancer Neg Hx     Colon cancer Neg Hx     Ovarian cancer Neg Hx        Current Outpatient Medications on File Prior to Visit   Medication Sig Dispense Refill    ARMOUR THYROID 90 mg Tab TAKE 1 TABLET(90 MG) BY MOUTH BEFORE BREAKFAST 90 tablet 0    baclofen (LIORESAL) 10 MG tablet Take 10 mg by mouth as needed.  1    baclofen (LIORESAL) 10 MG tablet Take 10 mg by mouth.      clonazePAM (KLONOPIN) 0.5 MG tablet Take 0.25 tablets by mouth.      cyclobenzaprine (FLEXERIL) 10 MG tablet TK 1 T PO QHS  1    cyclobenzaprine (FLEXERIL) 10 MG tablet TK 1 T PO QHS      diltiaZEM (CARDIZEM) 30 MG tablet every 12 (twelve) hours.   3    diltiaZEM (CARDIZEM) 30 MG tablet Take 30 mg by mouth.      diphth,pertus,acell,,tetanus (BOOSTRIX) 2.5-8-5 Lf-mcg-Lf/0.5mL Syrg injection Inject into the muscle. 0.5 mL 0    fluocinolone acetonide oil 0.01 % Drop GAL AA BID  2    fluocinolone acetonide oil 0.01 % Drop GAL AA BID      hydrochlorothiazide (HYDRODIURIL) 25 MG tablet Take 1 tablet (25 mg total) by mouth once daily. (Patient taking differently: Take 25 mg by mouth as needed. ) 90 tablet 3    hydroCHLOROthiazide (HYDRODIURIL) 25 MG tablet Take 25 mg by mouth.      HYDROcodone-acetaminophen (NORCO) 5-325 mg per tablet Take 1 tablet by mouth every 6 (six) hours as needed for Pain. 20 tablet 0    hydrocodone-acetaminophen 5-325mg (NORCO) 5-325 mg per tablet TK 1 T PO  QHS PRF SEVERE NIGHT TIME PRN ACUTE PAIN  0    ibuprofen (ADVIL,MOTRIN) 800 MG tablet Take 1 tablet (800 mg total) by mouth every 8 (eight) hours as needed for Pain. 30 tablet 1    nitrofurantoin, macrocrystal-monohydrate, (MACROBID) 100 MG capsule Take 1 capsule (100 mg total) by mouth 2 (two) times daily. 14 capsule 0     "nitrofurantoin, macrocrystal-monohydrate, (MACROBID) 100 MG capsule Take 1 capsule (100 mg total) by mouth 2 (two) times daily. 14 capsule 0    nystatin-triamcinolone (MYCOLOG II) cream Apply to affected area 2 times daily      ondansetron (ZOFRAN-ODT) 4 MG TbDL Take 1 tablet (4 mg total) by mouth every 6 (six) hours as needed. 20 tablet 0    phenazopyridine (PYRIDIUM) 200 MG tablet Take 200 mg by mouth.      thyroid, pork, (ARMOUR THYROID) 90 mg Tab Take 1 tablet by mouth.       Current Facility-Administered Medications on File Prior to Visit   Medication Dose Route Frequency Provider Last Rate Last Dose    estradiol cypionate 5 mg/mL injection 5 mg  5 mg Intramuscular Q28 Days Ruby Villegas MD   5 mg at 12/19/18 1318    testosterone cypionate injection 50 mg  50 mg Intramuscular Q28 Days Ruby Villegas MD   50 mg at 12/19/18 1318         /70 (BP Location: Right arm, Patient Position: Sitting, BP Method: Medium (Manual))   Ht 5' 2" (1.575 m)   Wt 69.1 kg (152 lb 5.4 oz)   LMP  (LMP Unknown)   BMI 27.86 kg/m²   Abdomen soft, nontender.   Vagina: normal vagina, incisions well healed.  +TTP in bilateral levator ani and OI      1. Pelvic floor tension  Ambulatory consult to Physical Therapy   2. Stress incontinence in female  Ambulatory consult to Physical Therapy   3. Numbness       Plan:  1. Post op well healedl-- no lifting > 50 pounds without assitance.  2. Neurology referral  3. Start pelvic floor PT with Elo Payne ([facility]): (p) [phone].  (f) [phone].    4. RTC 4 months  "

## 2019-02-13 NOTE — PROGRESS NOTES
Here for  hormone therapy injection, no complaints at this time , Injection given as ordered, tolerated well, no report of pain prior to or after injection. Return to clinic as scheduled.      Site - LB     Testosterone  50 mg  Depo Estradiol  5  mg

## 2019-02-13 NOTE — PATIENT INSTRUCTIONS
1. Post op well healedl-- no lifting > 50 pounds without assitance.  2. Neurology referral  3. Start pelvic floor PT with Elo Payne ([facility]): (p) [phone].  (f) [phone].    4. RTC 4 months

## 2019-03-11 ENCOUNTER — PATIENT MESSAGE (OUTPATIENT)
Dept: ENDOCRINOLOGY | Facility: CLINIC | Age: 59
End: 2019-03-11

## 2019-03-13 ENCOUNTER — CLINICAL SUPPORT (OUTPATIENT)
Dept: OBSTETRICS AND GYNECOLOGY | Facility: CLINIC | Age: 59
End: 2019-03-13
Payer: COMMERCIAL

## 2019-03-13 DIAGNOSIS — Z79.890 HORMONE REPLACEMENT THERAPY (HRT): Primary | ICD-10-CM

## 2019-03-13 PROCEDURE — 99999 PR PBB SHADOW E&M-EST. PATIENT-LVL II: ICD-10-PCS | Mod: PBBFAC,,,

## 2019-03-13 PROCEDURE — 96372 PR INJECTION,THERAP/PROPH/DIAG2ST, IM OR SUBCUT: ICD-10-PCS | Mod: S$GLB,,, | Performed by: OBSTETRICS & GYNECOLOGY

## 2019-03-13 PROCEDURE — 96372 THER/PROPH/DIAG INJ SC/IM: CPT | Mod: S$GLB,,, | Performed by: OBSTETRICS & GYNECOLOGY

## 2019-03-13 PROCEDURE — 99999 PR PBB SHADOW E&M-EST. PATIENT-LVL II: CPT | Mod: PBBFAC,,,

## 2019-03-13 RX ORDER — TESTOSTERONE CYPIONATE 200 MG/ML
50 INJECTION, SOLUTION INTRAMUSCULAR
Status: COMPLETED | OUTPATIENT
Start: 2019-03-13 | End: 2019-05-08

## 2019-03-13 RX ADMIN — TESTOSTERONE CYPIONATE 50 MG: 200 INJECTION, SOLUTION INTRAMUSCULAR at 01:03

## 2019-03-21 ENCOUNTER — PATIENT MESSAGE (OUTPATIENT)
Dept: OBSTETRICS AND GYNECOLOGY | Facility: CLINIC | Age: 59
End: 2019-03-21

## 2019-03-21 DIAGNOSIS — Z78.0 MENOPAUSE: Primary | ICD-10-CM

## 2019-03-21 RX ORDER — TESTOSTERONE CYPIONATE 200 MG/ML
75 INJECTION, SOLUTION INTRAMUSCULAR
Status: SHIPPED | OUTPATIENT
Start: 2019-03-21 | End: 2019-09-05

## 2019-03-26 ENCOUNTER — OFFICE VISIT (OUTPATIENT)
Dept: OPHTHALMOLOGY | Facility: CLINIC | Age: 59
End: 2019-03-26
Payer: COMMERCIAL

## 2019-03-26 DIAGNOSIS — H53.2 DIPLOPIA: ICD-10-CM

## 2019-03-26 DIAGNOSIS — E05.00 GRAVES DISEASE: Primary | ICD-10-CM

## 2019-03-26 PROCEDURE — 99999 PR PBB SHADOW E&M-EST. PATIENT-LVL II: ICD-10-PCS | Mod: PBBFAC,,, | Performed by: OPHTHALMOLOGY

## 2019-03-26 PROCEDURE — 92014 PR EYE EXAM, EST PATIENT,COMPREHESV: ICD-10-PCS | Mod: S$GLB,,, | Performed by: OPHTHALMOLOGY

## 2019-03-26 PROCEDURE — 92014 COMPRE OPH EXAM EST PT 1/>: CPT | Mod: S$GLB,,, | Performed by: OPHTHALMOLOGY

## 2019-03-26 PROCEDURE — 99999 PR PBB SHADOW E&M-EST. PATIENT-LVL II: CPT | Mod: PBBFAC,,, | Performed by: OPHTHALMOLOGY

## 2019-03-26 NOTE — PROGRESS NOTES
HPI     Here for overdue f/u graves disease  DLS 10/16/17  Graves disease  Dry eyes   Blepharitis  Pt states 1 month ago she started having eye pain, and has noticed eyes   are starting to bulge. Diplopia when she is tired  Was seen last fall at Dr Aquino office ?PVD  Still using AT bid   S/p orbital decompression 2012 Dr Carreon  Right orbital fullness sensation and stabbing pains 5x since hysterectomy   in January     Last edited by Elodia Darling MD on 3/26/2019  3:24 PM. (History)            Assessment /Plan     For exam results, see Encounter Report.    Graves disease    Diplopia      Patient with stable Halley measurements, full Ishihara and normal appearing nerves.  Discussed with patient that current exam shows healthy optic nerves though symptom of peripheral field vision loss is concerning.    Recommend VF with Dr. Martinez tomorrow and TSI to assess whether thyroid disease is active. May consider obtaining imaging pending VF and TSI results.    3/27/19: TSI within normal limits. HVF 24-2 results reviewed and no visual field deficits noted. Will need repeat HVF 24-2 prior to next visit to reconfirm.     Return in 6 months sooner any worsening.      I have reviewed and concur with the resident's history, physical, assessment, and plan.  I have personally interviewed and examined the patient.

## 2019-03-27 ENCOUNTER — TELEPHONE (OUTPATIENT)
Dept: OPHTHALMOLOGY | Facility: CLINIC | Age: 59
End: 2019-03-27

## 2019-03-27 DIAGNOSIS — E05.00 GRAVES DISEASE: Primary | ICD-10-CM

## 2019-03-29 ENCOUNTER — LAB VISIT (OUTPATIENT)
Dept: LAB | Facility: HOSPITAL | Age: 59
End: 2019-03-29
Attending: OPHTHALMOLOGY
Payer: COMMERCIAL

## 2019-03-29 DIAGNOSIS — E05.00 GRAVES DISEASE: ICD-10-CM

## 2019-03-29 PROCEDURE — 84445 ASSAY OF TSI GLOBULIN: CPT

## 2019-03-29 PROCEDURE — 36415 COLL VENOUS BLD VENIPUNCTURE: CPT | Mod: PO

## 2019-04-01 ENCOUNTER — PATIENT MESSAGE (OUTPATIENT)
Dept: OPHTHALMOLOGY | Facility: CLINIC | Age: 59
End: 2019-04-01

## 2019-04-01 LAB — TSI SER-ACNC: <0.1 IU/L

## 2019-04-05 ENCOUNTER — PATIENT MESSAGE (OUTPATIENT)
Dept: ENDOCRINOLOGY | Facility: CLINIC | Age: 59
End: 2019-04-05

## 2019-04-10 ENCOUNTER — CLINICAL SUPPORT (OUTPATIENT)
Dept: OBSTETRICS AND GYNECOLOGY | Facility: CLINIC | Age: 59
End: 2019-04-10
Payer: COMMERCIAL

## 2019-04-10 DIAGNOSIS — Z79.890 HORMONE REPLACEMENT THERAPY (HRT): Primary | ICD-10-CM

## 2019-04-10 PROCEDURE — 96372 PR INJECTION,THERAP/PROPH/DIAG2ST, IM OR SUBCUT: ICD-10-PCS | Mod: S$GLB,,, | Performed by: OBSTETRICS & GYNECOLOGY

## 2019-04-10 PROCEDURE — 96372 THER/PROPH/DIAG INJ SC/IM: CPT | Mod: S$GLB,,, | Performed by: OBSTETRICS & GYNECOLOGY

## 2019-04-10 RX ADMIN — TESTOSTERONE CYPIONATE 50 MG: 200 INJECTION, SOLUTION INTRAMUSCULAR at 01:04

## 2019-04-20 ENCOUNTER — PATIENT MESSAGE (OUTPATIENT)
Dept: ENDOCRINOLOGY | Facility: CLINIC | Age: 59
End: 2019-04-20

## 2019-04-20 DIAGNOSIS — E89.0 POSTABLATIVE HYPOTHYROIDISM: ICD-10-CM

## 2019-04-22 ENCOUNTER — OFFICE VISIT (OUTPATIENT)
Dept: NEUROLOGY | Facility: CLINIC | Age: 59
End: 2019-04-22
Payer: COMMERCIAL

## 2019-04-22 VITALS
BODY MASS INDEX: 28.07 KG/M2 | HEART RATE: 80 BPM | DIASTOLIC BLOOD PRESSURE: 90 MMHG | HEIGHT: 62 IN | SYSTOLIC BLOOD PRESSURE: 150 MMHG | WEIGHT: 152.56 LBS

## 2019-04-22 DIAGNOSIS — R20.2 PARESTHESIAS: Primary | ICD-10-CM

## 2019-04-22 PROCEDURE — 99999 PR PBB SHADOW E&M-EST. PATIENT-LVL III: CPT | Mod: PBBFAC,,, | Performed by: PSYCHIATRY & NEUROLOGY

## 2019-04-22 PROCEDURE — 3077F SYST BP >= 140 MM HG: CPT | Mod: CPTII,S$GLB,, | Performed by: PSYCHIATRY & NEUROLOGY

## 2019-04-22 PROCEDURE — 99205 OFFICE O/P NEW HI 60 MIN: CPT | Mod: S$GLB,,, | Performed by: PSYCHIATRY & NEUROLOGY

## 2019-04-22 PROCEDURE — 3077F PR MOST RECENT SYSTOLIC BLOOD PRESSURE >= 140 MM HG: ICD-10-PCS | Mod: CPTII,S$GLB,, | Performed by: PSYCHIATRY & NEUROLOGY

## 2019-04-22 PROCEDURE — 99205 PR OFFICE/OUTPT VISIT, NEW, LEVL V, 60-74 MIN: ICD-10-PCS | Mod: S$GLB,,, | Performed by: PSYCHIATRY & NEUROLOGY

## 2019-04-22 PROCEDURE — 3080F DIAST BP >= 90 MM HG: CPT | Mod: CPTII,S$GLB,, | Performed by: PSYCHIATRY & NEUROLOGY

## 2019-04-22 PROCEDURE — 3008F PR BODY MASS INDEX (BMI) DOCUMENTED: ICD-10-PCS | Mod: CPTII,S$GLB,, | Performed by: PSYCHIATRY & NEUROLOGY

## 2019-04-22 PROCEDURE — 3008F BODY MASS INDEX DOCD: CPT | Mod: CPTII,S$GLB,, | Performed by: PSYCHIATRY & NEUROLOGY

## 2019-04-22 PROCEDURE — 99999 PR PBB SHADOW E&M-EST. PATIENT-LVL III: ICD-10-PCS | Mod: PBBFAC,,, | Performed by: PSYCHIATRY & NEUROLOGY

## 2019-04-22 PROCEDURE — 3080F PR MOST RECENT DIASTOLIC BLOOD PRESSURE >= 90 MM HG: ICD-10-PCS | Mod: CPTII,S$GLB,, | Performed by: PSYCHIATRY & NEUROLOGY

## 2019-04-22 RX ORDER — THYROID, PORCINE 90 MG/1
TABLET ORAL
Qty: 90 TABLET | Refills: 0 | Status: SHIPPED | OUTPATIENT
Start: 2019-04-22 | End: 2019-07-17 | Stop reason: SDUPTHER

## 2019-04-22 NOTE — PROGRESS NOTES
[facility] - NEUROLOGY  [facility] [place]    Date: 2019   Patient Name: Alma Jackson   MRN: [id number]   PCP: Hira Jara  Referring Provider: Archana Iyer NP    Assessment:      This is Alma Jackson, 58 y.o. female with fibromyalgia, thyroid eye disease, and several months migratory paresthesias, will screen for metabolic v inflammatory v demyelinating.     Plan:      -  MRI brain, patient declined contrast  -  Labs         I discussed side effects of the medications. I asked the patient to  stop the medication if she notices serious adverse effects as we discussed and to seek immediate medical attention at an ER.     Waqar Garcia MD  [facility]   Department of Neurology    Subjective:      HPI:   Ms. Alma Jackson is a 58 y.o. female who presents with a chief complaint of paresthesias    Patient notes that she underwent aunbroa-mjomwowg-mhyvxseozwpd 2018 and developed paresthesias 4-6 weeks later.  These may be present on face, arms, legs in non-length dependent, non-dermatomal distribution.  She may go a day or more without paresthesias and denies any associated motor symptoms.    PAST MEDICAL HISTORY:  Past Medical History:   Diagnosis Date    Cataract     Fibromyalgia     Hx of colonic polyps     Hypertension     Hyperthyroidism     Hypothyroidism     Irregular heart beat     Ophthalmic Graves disease        PAST SURGICAL HISTORY:  Past Surgical History:   Procedure Laterality Date    CANTHOPLASTY Bilateral 2012    Performed [facility] at [facility] 1ST FLR     SECTION, CLASSIC      COLONOSCOPY N/A 2016    Performed by Benny Menchaca MD at [facility] ENDO (4TH FLR)    COLPORRHAPHY, POSTERIOR N/A 12/10/2018    [facility]    CYSTOSCOPY N/A 12/10/2018    [facility] at [facility] OR    DECOMPRESSION, ORBIT  Bilateral 11/29/2012    Performed by [facility] at [facility] 1ST FLR    DILATION, URETHRA N/A 12/10/2018    Performed by [facility] at [facility] OR    EYE SURGERY      HYSTERECTOMY, TOTAL, LAPAROSCOPIC; excision of umbilical mole N/A 12/10/2018    Performed by [facility] DO at [facility] OR    lasik ou      REPAIR, ENTEROCELE N/A 12/10/2018    Performed by [facility] DO at [facility] OR    REPAIR, RETRACTION, EYELID Bilateral 11/29/2012    Performed by [facility] at [facility] OR 1ST FLR    SALPINGO-OOPHORECTOMY, LAPAROSCOPIC Bilateral 12/10/2018    Performed by [facility] at [facility] OR    thyroid radiation      TUBAL LIGATION         CURRENT MEDS:  Current Outpatient Medications   Medication Sig Dispense Refill    ARMOUR THYROID 90 mg Tab TAKE 1 TABLET(90MG) BY MOUTH DAILY BEFORE BREAKFAST 90 tablet 0    baclofen (LIORESAL) 10 MG tablet Take 10 mg by mouth as needed.  1    clonazePAM (KLONOPIN) 0.5 MG tablet Take 0.25 tablets by mouth.      cyclobenzaprine (FLEXERIL) 10 MG tablet TK 1 T PO QHS  1    diltiaZEM (CARDIZEM) 30 MG tablet every 12 (twelve) hours.   3    diphth,pertus,acell,,tetanus (BOOSTRIX) 2.5-8-5 Lf-mcg-Lf/0.5mL Syrg injection Inject into the muscle. 0.5 mL 0    fluocinolone acetonide oil 0.01 % Drop GAL AA BID  2    hydrochlorothiazide (HYDRODIURIL) 25 MG tablet Take 1 tablet (25 mg total) by mouth once daily. (Patient taking differently: Take 25 mg by mouth as needed. ) 90 tablet 3    ibuprofen (ADVIL,MOTRIN) 800 MG tablet Take 1 tablet (800 mg total) by mouth every 8 (eight) hours as needed for Pain. 30 tablet 1    nystatin-triamcinolone (MYCOLOG II) cream Apply to affected area 2 times daily      ondansetron (ZOFRAN-ODT) 4 MG TbDL Take 1 tablet (4 mg total) by mouth every 6 (six) hours as needed. 20 tablet 0    phenazopyridine (PYRIDIUM) 200 MG tablet Take 200 mg by mouth.       Current Facility-Administered Medications  "  Medication Dose Route Frequency Provider Last Rate Last Dose    estradiol cypionate 5 mg/mL injection 5 mg  5 mg Intramuscular Q28 Days Ruby Villegas MD   5 mg at 04/10/19 1327    testosterone cypionate injection 50 mg  50 mg Intramuscular Q28 Days Ruby Villegas MD   50 mg at 04/10/19 1329    testosterone cypionate injection 76 mg  76 mg Intramuscular Q28 Days Ruby Villegas MD           ALLERGIES:  Review of patient's allergies indicates:   Allergen Reactions    Azithromycin (bulk) Rash     z-pack       FAMILY HISTORY:  Family History   Problem Relation Age of Onset    Heart failure Mother     Thyroid disease Mother     Stroke Mother     Parkinsonism Father     Hypertension Maternal Grandmother     Stroke Maternal Grandmother     Diabetes Maternal Grandfather     Stroke Paternal Grandfather     Amblyopia Neg Hx     Blindness Neg Hx     Cancer Neg Hx     Cataracts Neg Hx     Glaucoma Neg Hx     Macular degeneration Neg Hx     Retinal detachment Neg Hx     Strabismus Neg Hx     Breast cancer Neg Hx     Colon cancer Neg Hx     Ovarian cancer Neg Hx        SOCIAL HISTORY:  Social History     Tobacco Use    Smoking status: Current Every Day Smoker     Types: Vaping with nicotine     Last attempt to quit: 2011     Years since quittin.8    Smokeless tobacco: Never Used   Substance Use Topics    Alcohol use: Yes     Alcohol/week: 0.0 oz     Comment: SOCIALLY    Drug use: No       Review of Systems:  12 review of systems is negative except for the symptoms mentioned in HPI.        Objective:     Vitals:    19 1132   BP: (!) 150/90   Pulse: 80   Weight: 69.2 kg (152 lb 8.9 oz)   Height: 5' 2" (1.575 m)       General: NAD, well nourished   Eyes: no tearing, discharge, no erythema   ENT: moist mucous membranes of the oral cavity, nares patent    Neck: Supple, full range of motion  Cardiovascular: Warm and well perfused, pulses equal and symmetrical  Lungs: Normal " work of breathing, normal chest wall excursions  Skin: No rash, lesions, or breakdown on exposed skin  Psychiatry: Mood and affect are appropriate   Abdomen: soft, non tender, non distended  Extremeties: No cyanosis, clubbing or edema.    Neurological   MENTAL STATUS: Alert and oriented to person, place, and time. Attention and concentration within normal limits. Speech without dysarthria, able to name and repeat without difficulty. Recent and remote memory within normal limits   CRANIAL NERVES: Visual fields intact. PERRL. EOMI. Facial sensation intact. Face symmetrical. Hearing grossly intact. Full shoulder shrug bilaterally. Tongue protrudes midline   SENSORY: Sensation is intact to light touch throughout.   Negative Romberg.   MOTOR: Normal bulk and tone. No pronator drift.  5/5 deltoid, biceps, triceps, interosseous, hand  bilaterally. 5/5 iliopsoas, knee extension/flexion, foot dorsi/plantarflexion bilaterally.    REFLEXES: Symmetric and 2+ throughout, 1-2 beats clonus and neg orozco bilaterally.   CEREBELLAR/COORDINATION/GAIT: Gait steady with normal arm swing and stride length, difficulty with tandem.  Heel to shin intact. Finger to nose intact. Normal rapid alternating movements.

## 2019-04-22 NOTE — LETTER
April 22, 2019      Archana Iyer, NP  [address]  [facility] [address]           [facility]  [address]  [facility] [address]  Phone: [phone]  Fax: [phone]          Patient: Alma Jackson   MR Number: [id number]   YOB: 1960   Date of Visit: 4/22/2019       Dear Archana Iyer:    Thank you for referring Alma Jackson to me for evaluation. Attached you will find relevant portions of my assessment and plan of care.    If you have questions, please do not hesitate to call me. I look forward to following Alma Jackson along with you.    Sincerely,    Waqar Garcia MD    Enclosure  CC:  No Recipients    If you would like to receive this communication electronically, please contact [email or web address] or [phone] to request more information on [employer] Link access.    For providers and/or their staff who would like to refer a patient to [facility], please contact us through our one-stop-shop provider referral line, [facility] , at [phone].    If you feel you have received this communication in error or would no longer like to receive these types of communications, please e-mail [email or web address]

## 2019-04-24 ENCOUNTER — PATIENT MESSAGE (OUTPATIENT)
Dept: OPHTHALMOLOGY | Facility: CLINIC | Age: 59
End: 2019-04-24

## 2019-04-25 ENCOUNTER — HOSPITAL ENCOUNTER (OUTPATIENT)
Dept: RADIOLOGY | Facility: HOSPITAL | Age: 59
Discharge: HOME | End: 2019-04-25
Attending: PSYCHIATRY & NEUROLOGY
Payer: COMMERCIAL

## 2019-04-25 DIAGNOSIS — R20.2 PARESTHESIAS: ICD-10-CM

## 2019-04-25 PROCEDURE — 70551 MRI BRAIN STEM W/O DYE: CPT | Mod: TC

## 2019-04-25 PROCEDURE — 70551 MRI BRAIN WITHOUT CONTRAST: ICD-10-PCS | Mod: 26,,, | Performed by: RADIOLOGY

## 2019-04-25 PROCEDURE — 70551 MRI BRAIN STEM W/O DYE: CPT | Mod: 26,,, | Performed by: RADIOLOGY

## 2019-05-01 RX ORDER — THYROID 90 MG/1
TABLET ORAL
Qty: 90 TABLET | Refills: 0 | OUTPATIENT
Start: 2019-05-01

## 2019-05-03 ENCOUNTER — PATIENT MESSAGE (OUTPATIENT)
Dept: ENDOCRINOLOGY | Facility: CLINIC | Age: 59
End: 2019-05-03

## 2019-05-08 ENCOUNTER — CLINICAL SUPPORT (OUTPATIENT)
Dept: OBSTETRICS AND GYNECOLOGY | Facility: CLINIC | Age: 59
End: 2019-05-08
Payer: COMMERCIAL

## 2019-05-08 PROCEDURE — 99999 PR PBB SHADOW E&M-EST. PATIENT-LVL II: CPT | Mod: PBBFAC,,,

## 2019-05-08 PROCEDURE — 99999 PR PBB SHADOW E&M-EST. PATIENT-LVL II: ICD-10-PCS | Mod: PBBFAC,,,

## 2019-05-08 PROCEDURE — 96372 PR INJECTION,THERAP/PROPH/DIAG2ST, IM OR SUBCUT: ICD-10-PCS | Mod: S$GLB,,, | Performed by: OBSTETRICS & GYNECOLOGY

## 2019-05-08 PROCEDURE — 96372 THER/PROPH/DIAG INJ SC/IM: CPT | Mod: S$GLB,,, | Performed by: OBSTETRICS & GYNECOLOGY

## 2019-05-08 RX ADMIN — TESTOSTERONE CYPIONATE 50 MG: 200 INJECTION, SOLUTION INTRAMUSCULAR at 01:05

## 2019-06-05 ENCOUNTER — CLINICAL SUPPORT (OUTPATIENT)
Dept: OBSTETRICS AND GYNECOLOGY | Facility: CLINIC | Age: 59
End: 2019-06-05
Payer: COMMERCIAL

## 2019-06-05 PROCEDURE — 96372 THER/PROPH/DIAG INJ SC/IM: CPT | Mod: S$GLB,,, | Performed by: OBSTETRICS & GYNECOLOGY

## 2019-06-05 PROCEDURE — 99999 PR PBB SHADOW E&M-EST. PATIENT-LVL I: ICD-10-PCS | Mod: PBBFAC,,,

## 2019-06-05 PROCEDURE — 96372 PR INJECTION,THERAP/PROPH/DIAG2ST, IM OR SUBCUT: ICD-10-PCS | Mod: S$GLB,,, | Performed by: OBSTETRICS & GYNECOLOGY

## 2019-06-05 PROCEDURE — 99999 PR PBB SHADOW E&M-EST. PATIENT-LVL I: CPT | Mod: PBBFAC,,,

## 2019-06-05 RX ADMIN — TESTOSTERONE CYPIONATE 76 MG: 200 INJECTION, SOLUTION INTRAMUSCULAR at 01:06

## 2019-06-05 NOTE — PROGRESS NOTES
Here for hormone therapy injection, no complaints at this time, Injection given as ordered, tolerated well, no report of pain prior to or after injection. Return to clinic as scheduled.     Site - LB    Testosterone 76 mg  Depo Estradiol 5 mg    Clinic Supplied Medication

## 2019-06-13 ENCOUNTER — OFFICE VISIT (OUTPATIENT)
Dept: UROGYNECOLOGY | Facility: CLINIC | Age: 59
End: 2019-06-13
Payer: COMMERCIAL

## 2019-06-13 VITALS
BODY MASS INDEX: 27.75 KG/M2 | DIASTOLIC BLOOD PRESSURE: 80 MMHG | HEIGHT: 62 IN | WEIGHT: 150.81 LBS | SYSTOLIC BLOOD PRESSURE: 120 MMHG

## 2019-06-13 DIAGNOSIS — M62.89 PELVIC FLOOR TENSION: Primary | ICD-10-CM

## 2019-06-13 PROCEDURE — 3074F PR MOST RECENT SYSTOLIC BLOOD PRESSURE < 130 MM HG: ICD-10-PCS | Mod: CPTII,S$GLB,, | Performed by: NURSE PRACTITIONER

## 2019-06-13 PROCEDURE — 3079F PR MOST RECENT DIASTOLIC BLOOD PRESSURE 80-89 MM HG: ICD-10-PCS | Mod: CPTII,S$GLB,, | Performed by: NURSE PRACTITIONER

## 2019-06-13 PROCEDURE — 3008F PR BODY MASS INDEX (BMI) DOCUMENTED: ICD-10-PCS | Mod: CPTII,S$GLB,, | Performed by: NURSE PRACTITIONER

## 2019-06-13 PROCEDURE — 3008F BODY MASS INDEX DOCD: CPT | Mod: CPTII,S$GLB,, | Performed by: NURSE PRACTITIONER

## 2019-06-13 PROCEDURE — 99213 OFFICE O/P EST LOW 20 MIN: CPT | Mod: S$GLB,,, | Performed by: NURSE PRACTITIONER

## 2019-06-13 PROCEDURE — 3079F DIAST BP 80-89 MM HG: CPT | Mod: CPTII,S$GLB,, | Performed by: NURSE PRACTITIONER

## 2019-06-13 PROCEDURE — 99999 PR PBB SHADOW E&M-EST. PATIENT-LVL III: ICD-10-PCS | Mod: PBBFAC,,, | Performed by: NURSE PRACTITIONER

## 2019-06-13 PROCEDURE — 99213 PR OFFICE/OUTPT VISIT, EST, LEVL III, 20-29 MIN: ICD-10-PCS | Mod: S$GLB,,, | Performed by: NURSE PRACTITIONER

## 2019-06-13 PROCEDURE — 99999 PR PBB SHADOW E&M-EST. PATIENT-LVL III: CPT | Mod: PBBFAC,,, | Performed by: NURSE PRACTITIONER

## 2019-06-13 PROCEDURE — 3074F SYST BP LT 130 MM HG: CPT | Mod: CPTII,S$GLB,, | Performed by: NURSE PRACTITIONER

## 2019-06-13 RX ORDER — AMITRIPTYLINE HYDROCHLORIDE 10 MG/1
10 TABLET, FILM COATED ORAL NIGHTLY
Qty: 70 TABLET | Refills: 0 | Status: SHIPPED | OUTPATIENT
Start: 2019-06-13 | End: 2019-07-17 | Stop reason: SDUPTHER

## 2019-06-13 NOTE — PATIENT INSTRUCTIONS
1. no lifting > 50 pounds without assitance.  2 continue pelvic floor PT with Elo Payne ([facility]): (p) [phone].  (f) [phone].    3. Amitriptyline 10 mg nightly -- can increase by 10 mg weekly up to 50 mg  4. RTC 6 months

## 2019-06-13 NOTE — PROGRESS NOTES
2019  Alma Jackson is a 58 y.o. female status post total laparoscopic hysterectomy for abnormal uterine bleeding and pelvic relaxation on 2018.   doing well. Still going to pelvic floor PT with Elo Payne.  Dyspareunia is improved.  Still has pain around opening of vagina. Neuro workup was negative.    1. Total Laparoscopic Hysterectomy  2.  Laparoscopic Bilateral Salpingo-oophorectomy  3.  Extensive Lysis of bladder to lower uterine segment (50 minutes of dissection)   4.  Enterocele repair   5.  Rectocele repair  6.  Urethral dilation   7.  Cystourethroscopy  8.  Excision of umbilical lesion      Incontinence episode: ADDISON with sneeze-- no difference since surgery,   OAB symptoms: No   Incomplete emptying: No  Prolapse symptoms: No        Review of patient's allergies indicates:   Allergen Reactions    Azithromycin (bulk) Rash     z-pack      Past Medical History:   Diagnosis Date    Cataract     Fibromyalgia     Hx of colonic polyps     Hypertension     Hyperthyroidism     Hypothyroidism     Irregular heart beat     Ophthalmic Graves disease      Past Surgical History:   Procedure Laterality Date    CANTHOPLASTY Bilateral 2012    Performed by [facility] 1ST FLR     SECTION, CLASSIC      COLONOSCOPY N/A 2016    Procedure: COLONOSCOPY;  Surgeon: Benny Menchaca MD;  Location: [facility] ([facility] FLR);  Service: Endoscopy;  Laterality: N/A;  requests Dr Menchaca    COLONOSCOPY N/A 2016    Performed by Benny [facility]chaca MD at [facility] (4TH FLR)    COLPORRHAPHY, POSTERIOR N/A 12/10/2018    [facility] by [facility] OR    CYSTOSCOPY N/A 12/10/2018    Procedure: CYSTOSCOPY;  Surgeon: Pinky Ko DO;  Location: [facility] OR;  Service: OB/GYN;  Laterality: N/A;    CYSTOSCOPY N/A 12/10/2018    [facility]formed by [facility] at [facility] OR    DECOMPRESSION, ORBIT Bilateral 2012    Performed  by [facility]o [facility]reon MD at [facility] OR 1ST FLR    DILATION OF URETHRA N/A 12/10/2018    Procedure: DILATION, URETHRA;  Surgeon: Pinky Ko DO;  Location: [facility] [place] OR;  Service: OB/GYN;  Laterality: N/A;    DILATION, URETHRA N/A 12/10/2018    Performed by Pinky Ko DO at [facility] [place] OR    ENTEROCELE REPAIR N/A 12/10/2018    Procedure: REPAIR, ENTEROCELE;  Surgeon: Pinky Ko DO;  Location: [facility] [place] OR;  Service: OB/GYN;  Laterality: N/A;    EYE SURGERY      HYSTERECTOMY, TOTAL, LAPAROSCOPIC; excision of umbilical mole N/A 12/10/2018    Performed by Pinky Ko DO at [facility] [place] OR    LAPAROSCOPIC SALPINGO-OOPHORECTOMY Bilateral 12/10/2018    Procedure: SALPINGO-OOPHORECTOMY, LAPAROSCOPIC;  Surgeon: Pinky Ko DO;  Location: [facility] [place] OR;  Service: OB/GYN;  Laterality: Bilateral;    LAPAROSCOPIC TOTAL HYSTERECTOMY N/A 12/10/2018    Procedure: HYSTERECTOMY, TOTAL, LAPAROSCOPIC; excision of umbilical mole;  Surgeon: Pinky Ko DO;  Location: [facility] [place] OR;  Service: OB/GYN;  Laterality: N/A;    lasik ou      POSTERIOR REPAIR N/A 12/10/2018    Procedure: COLPORRHAPHY, POSTERIOR;  Surgeon: Pinky Ko DO;  Location: [facility] [place] OR;  Service: OB/GYN;  Laterality: N/A;    REPAIR, ENTEROCELE N/A 12/10/2018    Performed by Pinky Ko DO at [facility] [place] OR    REPAIR, RETRACTION, EYELID Bilateral 11/29/2012    Performed by Rodo [facility]reon MD at [facility] OR 1ST FLR    SALPINGO-OOPHORECTOMY, LAPAROSCOPIC Bilateral 12/10/2018    Performed by Pinky Ko DO at [facility] [place] OR    thyroid radiation      TUBAL LIGATION       Family History   Problem Relation Age of Onset    Heart failure Mother     Thyroid disease Mother     Stroke Mother     Parkinsonism Father     Hypertension Maternal Grandmother     Stroke Maternal Grandmother     Diabetes Maternal Grandfather     Stroke Paternal Grandfather     Amblyopia Neg Hx     Blindness Neg Hx     Cancer Neg Hx      Cataracts Neg Hx     Glaucoma Neg Hx     Macular degeneration Neg Hx     Retinal detachment Neg Hx     Strabismus Neg Hx     Breast cancer Neg Hx     Colon cancer Neg Hx     Ovarian cancer Neg Hx        Current Outpatient Medications on File Prior to Visit   Medication Sig Dispense Refill    ARMOUR THYROID 90 mg Tab TAKE 1 TABLET(90 MG) BY MOUTH BEFORE BREAKFAST 90 tablet 0    baclofen (LIORESAL) 10 MG tablet Take 10 mg by mouth as needed.  1    baclofen (LIORESAL) 10 MG tablet Take 10 mg by mouth.      clonazePAM (KLONOPIN) 0.5 MG tablet Take 0.25 tablets by mouth.      cyclobenzaprine (FLEXERIL) 10 MG tablet TK 1 T PO QHS  1    cyclobenzaprine (FLEXERIL) 10 MG tablet TK 1 T PO QHS      diltiaZEM (CARDIZEM) 30 MG tablet every 12 (twelve) hours.   3    diltiaZEM (CARDIZEM) 30 MG tablet Take 30 mg by mouth.      diphth,pertus,acell,,tetanus (BOOSTRIX) 2.5-8-5 Lf-mcg-Lf/0.5mL Syrg injection Inject into the muscle. 0.5 mL 0    fluocinolone acetonide oil 0.01 % Drop GAL AA BID  2    fluocinolone acetonide oil 0.01 % Drop GAL AA BID      hydrochlorothiazide (HYDRODIURIL) 25 MG tablet Take 1 tablet (25 mg total) by mouth once daily. (Patient taking differently: Take 25 mg by mouth as needed. ) 90 tablet 3    hydroCHLOROthiazide (HYDRODIURIL) 25 MG tablet Take 25 mg by mouth.      HYDROcodone-acetaminophen (NORCO) 5-325 mg per tablet Take 1 tablet by mouth every 6 (six) hours as needed for Pain. 20 tablet 0    hydrocodone-acetaminophen 5-325mg (NORCO) 5-325 mg per tablet TK 1 T PO  QHS PRF SEVERE NIGHT TIME PRN ACUTE PAIN  0    ibuprofen (ADVIL,MOTRIN) 800 MG tablet Take 1 tablet (800 mg total) by mouth every 8 (eight) hours as needed for Pain. 30 tablet 1    nitrofurantoin, macrocrystal-monohydrate, (MACROBID) 100 MG capsule Take 1 capsule (100 mg total) by mouth 2 (two) times daily. 14 capsule 0    nitrofurantoin, macrocrystal-monohydrate, (MACROBID) 100 MG capsule Take 1 capsule (100 mg total)  "by mouth 2 (two) times daily. 14 capsule 0    nystatin-triamcinolone (MYCOLOG II) cream Apply to affected area 2 times daily      ondansetron (ZOFRAN-ODT) 4 MG TbDL Take 1 tablet (4 mg total) by mouth every 6 (six) hours as needed. 20 tablet 0    phenazopyridine (PYRIDIUM) 200 MG tablet Take 200 mg by mouth.      thyroid, pork, (ARMOUR THYROID) 90 mg Tab Take 1 tablet by mouth.       Current Facility-Administered Medications on File Prior to Visit   Medication Dose Route Frequency Provider Last Rate Last Dose    estradiol cypionate 5 mg/mL injection 5 mg  5 mg Intramuscular Q28 Days Ruby Villegas MD   5 mg at 12/19/18 1318    testosterone cypionate injection 50 mg  50 mg Intramuscular Q28 Days Ruby Villegas MD   50 mg at 12/19/18 1318         /80 (BP Location: Left arm, Patient Position: Sitting, BP Method: Medium (Manual))   Ht 5' 2" (1.575 m)   Wt 68.4 kg (150 lb 12.7 oz)   LMP  (LMP Unknown)   BMI 27.58 kg/m²   Abdomen soft, nontender.       Pelvic deferred    1. Pelvic floor tension       Plan:  1.  no lifting > 50 pounds without assitance.  2 continue pelvic floor PT with Elo Payne ([facility] [place]): (p) [phone].  (f) [phone].    3. Amitriptyline 10 mg nightly -- can increase by 10 mg weekly up to 50 mg  4. RTC 6 months  "

## 2019-06-14 ENCOUNTER — PATIENT MESSAGE (OUTPATIENT)
Dept: UROGYNECOLOGY | Facility: CLINIC | Age: 59
End: 2019-06-14

## 2019-07-03 ENCOUNTER — CLINICAL SUPPORT (OUTPATIENT)
Dept: OBSTETRICS AND GYNECOLOGY | Facility: CLINIC | Age: 59
End: 2019-07-03
Payer: COMMERCIAL

## 2019-07-03 PROCEDURE — 96372 THER/PROPH/DIAG INJ SC/IM: CPT | Mod: S$GLB,,, | Performed by: OBSTETRICS & GYNECOLOGY

## 2019-07-03 PROCEDURE — 96372 PR INJECTION,THERAP/PROPH/DIAG2ST, IM OR SUBCUT: ICD-10-PCS | Mod: S$GLB,,, | Performed by: OBSTETRICS & GYNECOLOGY

## 2019-07-03 PROCEDURE — 99999 PR PBB SHADOW E&M-EST. PATIENT-LVL I: CPT | Mod: PBBFAC,,,

## 2019-07-03 PROCEDURE — 99999 PR PBB SHADOW E&M-EST. PATIENT-LVL I: ICD-10-PCS | Mod: PBBFAC,,,

## 2019-07-03 RX ADMIN — TESTOSTERONE CYPIONATE 76 MG: 200 INJECTION, SOLUTION INTRAMUSCULAR at 03:07

## 2019-07-17 ENCOUNTER — PATIENT MESSAGE (OUTPATIENT)
Dept: OBSTETRICS AND GYNECOLOGY | Facility: CLINIC | Age: 59
End: 2019-07-17

## 2019-07-17 ENCOUNTER — PATIENT MESSAGE (OUTPATIENT)
Dept: UROGYNECOLOGY | Facility: CLINIC | Age: 59
End: 2019-07-17

## 2019-07-17 DIAGNOSIS — E89.0 POSTABLATIVE HYPOTHYROIDISM: ICD-10-CM

## 2019-07-17 DIAGNOSIS — M62.89 PELVIC FLOOR TENSION: Primary | ICD-10-CM

## 2019-07-17 RX ORDER — AMITRIPTYLINE HYDROCHLORIDE 10 MG/1
10 TABLET, FILM COATED ORAL NIGHTLY
Qty: 90 TABLET | Refills: 0 | Status: SHIPPED | OUTPATIENT
Start: 2019-07-17 | End: 2019-10-17 | Stop reason: SDUPTHER

## 2019-07-18 DIAGNOSIS — R94.6 ABNORMAL THYROID FUNCTION TEST: Primary | ICD-10-CM

## 2019-07-18 RX ORDER — THYROID 90 MG/1
TABLET ORAL
Qty: 90 TABLET | Refills: 0 | Status: SHIPPED | OUTPATIENT
Start: 2019-07-18 | End: 2019-09-05 | Stop reason: SDUPTHER

## 2019-07-18 RX ORDER — THYROID, PORCINE 90 MG/1
TABLET ORAL
Qty: 90 TABLET | Refills: 0 | Status: SHIPPED | OUTPATIENT
Start: 2019-07-18 | End: 2019-07-18 | Stop reason: SDUPTHER

## 2019-07-18 NOTE — TELEPHONE ENCOUNTER
Last seen by Dr. Rodriguez 2018     Lab Results   Component Value Date    TSH 0.080 (L) 02/06/2019     PLAN:  Repeat labs   Schedule f/u   No future refills.

## 2019-07-24 ENCOUNTER — PATIENT MESSAGE (OUTPATIENT)
Dept: OBSTETRICS AND GYNECOLOGY | Facility: CLINIC | Age: 59
End: 2019-07-24

## 2019-08-01 ENCOUNTER — CLINICAL SUPPORT (OUTPATIENT)
Dept: OBSTETRICS AND GYNECOLOGY | Facility: CLINIC | Age: 59
End: 2019-08-01
Payer: COMMERCIAL

## 2019-08-01 PROCEDURE — 96372 PR INJECTION,THERAP/PROPH/DIAG2ST, IM OR SUBCUT: ICD-10-PCS | Mod: S$GLB,,, | Performed by: OBSTETRICS & GYNECOLOGY

## 2019-08-01 PROCEDURE — 99999 PR PBB SHADOW E&M-EST. PATIENT-LVL I: CPT | Mod: PBBFAC,,,

## 2019-08-01 PROCEDURE — 99999 PR PBB SHADOW E&M-EST. PATIENT-LVL I: ICD-10-PCS | Mod: PBBFAC,,,

## 2019-08-01 PROCEDURE — 96372 THER/PROPH/DIAG INJ SC/IM: CPT | Mod: S$GLB,,, | Performed by: OBSTETRICS & GYNECOLOGY

## 2019-08-01 RX ADMIN — TESTOSTERONE CYPIONATE 76 MG: 200 INJECTION, SOLUTION INTRAMUSCULAR at 01:08

## 2019-08-29 ENCOUNTER — CLINICAL SUPPORT (OUTPATIENT)
Dept: OBSTETRICS AND GYNECOLOGY | Facility: CLINIC | Age: 59
End: 2019-08-29
Payer: COMMERCIAL

## 2019-08-29 PROCEDURE — 96372 PR INJECTION,THERAP/PROPH/DIAG2ST, IM OR SUBCUT: ICD-10-PCS | Mod: S$GLB,,, | Performed by: OBSTETRICS & GYNECOLOGY

## 2019-08-29 PROCEDURE — 99999 PR PBB SHADOW E&M-EST. PATIENT-LVL I: CPT | Mod: PBBFAC,,,

## 2019-08-29 PROCEDURE — 99999 PR PBB SHADOW E&M-EST. PATIENT-LVL I: ICD-10-PCS | Mod: PBBFAC,,,

## 2019-08-29 PROCEDURE — 96372 THER/PROPH/DIAG INJ SC/IM: CPT | Mod: S$GLB,,, | Performed by: OBSTETRICS & GYNECOLOGY

## 2019-08-29 RX ADMIN — TESTOSTERONE CYPIONATE 76 MG: 200 INJECTION, SOLUTION INTRAMUSCULAR at 01:08

## 2019-08-29 NOTE — PROGRESS NOTES
Here for hormone therapy injection, no complaints at this time , Injection given as ordered, tolerated well, no report of pain prior to or after injection. Return to clinic as scheduled.     Site - RB    Testosterone 76 mg  Depo Estradiol 5 mg    Clinic Supplied Medication

## 2019-08-30 ENCOUNTER — LAB VISIT (OUTPATIENT)
Dept: LAB | Facility: HOSPITAL | Age: 59
End: 2019-08-30
Attending: INTERNAL MEDICINE
Payer: COMMERCIAL

## 2019-08-30 DIAGNOSIS — R94.6 ABNORMAL THYROID FUNCTION TEST: ICD-10-CM

## 2019-08-30 LAB — TSH SERPL DL<=0.005 MIU/L-ACNC: 0.61 UIU/ML (ref 0.4–4)

## 2019-08-30 PROCEDURE — 84443 ASSAY THYROID STIM HORMONE: CPT

## 2019-08-30 PROCEDURE — 36415 COLL VENOUS BLD VENIPUNCTURE: CPT | Mod: PO

## 2019-09-05 ENCOUNTER — OFFICE VISIT (OUTPATIENT)
Dept: ENDOCRINOLOGY | Facility: CLINIC | Age: 59
End: 2019-09-05
Payer: COMMERCIAL

## 2019-09-05 ENCOUNTER — PATIENT MESSAGE (OUTPATIENT)
Dept: OBSTETRICS AND GYNECOLOGY | Facility: CLINIC | Age: 59
End: 2019-09-05

## 2019-09-05 VITALS
HEART RATE: 100 BPM | SYSTOLIC BLOOD PRESSURE: 126 MMHG | DIASTOLIC BLOOD PRESSURE: 84 MMHG | WEIGHT: 153 LBS | BODY MASS INDEX: 28.16 KG/M2 | HEIGHT: 62 IN

## 2019-09-05 DIAGNOSIS — E89.0 POSTABLATIVE HYPOTHYROIDISM: Primary | ICD-10-CM

## 2019-09-05 DIAGNOSIS — M85.80 OSTEOPENIA DETERMINED BY X-RAY: ICD-10-CM

## 2019-09-05 DIAGNOSIS — M85.80 OSTEOPENIA, UNSPECIFIED LOCATION: ICD-10-CM

## 2019-09-05 DIAGNOSIS — E55.9 VITAMIN D DEFICIENCY: ICD-10-CM

## 2019-09-05 PROCEDURE — 3074F PR MOST RECENT SYSTOLIC BLOOD PRESSURE < 130 MM HG: ICD-10-PCS | Mod: CPTII,S$GLB,, | Performed by: GENERAL ACUTE CARE HOSPITAL

## 2019-09-05 PROCEDURE — 99999 PR PBB SHADOW E&M-EST. PATIENT-LVL IV: CPT | Mod: PBBFAC,,, | Performed by: GENERAL ACUTE CARE HOSPITAL

## 2019-09-05 PROCEDURE — 3008F BODY MASS INDEX DOCD: CPT | Mod: CPTII,S$GLB,, | Performed by: GENERAL ACUTE CARE HOSPITAL

## 2019-09-05 PROCEDURE — 99999 PR PBB SHADOW E&M-EST. PATIENT-LVL IV: ICD-10-PCS | Mod: PBBFAC,,, | Performed by: GENERAL ACUTE CARE HOSPITAL

## 2019-09-05 PROCEDURE — 3079F PR MOST RECENT DIASTOLIC BLOOD PRESSURE 80-89 MM HG: ICD-10-PCS | Mod: CPTII,S$GLB,, | Performed by: GENERAL ACUTE CARE HOSPITAL

## 2019-09-05 PROCEDURE — 99214 OFFICE O/P EST MOD 30 MIN: CPT | Mod: S$GLB,,, | Performed by: GENERAL ACUTE CARE HOSPITAL

## 2019-09-05 PROCEDURE — 3008F PR BODY MASS INDEX (BMI) DOCUMENTED: ICD-10-PCS | Mod: CPTII,S$GLB,, | Performed by: GENERAL ACUTE CARE HOSPITAL

## 2019-09-05 PROCEDURE — 99214 PR OFFICE/OUTPT VISIT, EST, LEVL IV, 30-39 MIN: ICD-10-PCS | Mod: S$GLB,,, | Performed by: GENERAL ACUTE CARE HOSPITAL

## 2019-09-05 PROCEDURE — 3074F SYST BP LT 130 MM HG: CPT | Mod: CPTII,S$GLB,, | Performed by: GENERAL ACUTE CARE HOSPITAL

## 2019-09-05 PROCEDURE — 3079F DIAST BP 80-89 MM HG: CPT | Mod: CPTII,S$GLB,, | Performed by: GENERAL ACUTE CARE HOSPITAL

## 2019-09-05 RX ORDER — VIT C/E/ZN/COPPR/LUTEIN/ZEAXAN 250MG-90MG
2000 CAPSULE ORAL DAILY
Qty: 120 CAPSULE | Refills: 3 | COMMUNITY
Start: 2019-09-05 | End: 2021-04-06

## 2019-09-05 RX ORDER — LATANOPROST 50 UG/ML
SOLUTION/ DROPS OPHTHALMIC
Refills: 11 | COMMUNITY
Start: 2019-08-24 | End: 2020-07-04 | Stop reason: SDUPTHER

## 2019-09-05 RX ORDER — THYROID 90 MG/1
TABLET ORAL
Qty: 90 TABLET | Refills: 0 | Status: SHIPPED | OUTPATIENT
Start: 2019-09-05 | End: 2019-11-26 | Stop reason: SDUPTHER

## 2019-09-05 RX ORDER — DILTIAZEM HYDROCHLORIDE 30 MG/1
TABLET, FILM COATED ORAL
COMMUNITY
Start: 2019-07-18 | End: 2019-09-05 | Stop reason: SDUPTHER

## 2019-09-05 NOTE — ASSESSMENT & PLAN NOTE
-S/p LICEA for Graves disease on 2009, Now with post-ablative hypothyroidism   -Clinically and chemically euthyroid   -TSH 0.66       Plan:   -C/w [place] thyroid 90mg PO daily before breakfast   -TFT prior to next visit

## 2019-09-05 NOTE — ASSESSMENT & PLAN NOTE
-DEXA on 2014 with evidence of osteopenia   -Denies steroid use   -Pt is s/p hysterectomy on estrogen replacement therapy     Plan:   -Will send for DEXA scan   -C/w Vitamin D 2,000 units daily   -Pt refusing to take Ca supplements

## 2019-09-05 NOTE — PROGRESS NOTES
"Subjective:      Patient ID: Alma Jackson is a 58 y.o. female.    Chief Complaint:  Post-ablative hypothyroidism       History of Present Illness  59 YO Female w/ post-ablative hypothyroidism 2/2 Grave's disease, Osteopenia, vitamin deficiency, HTN, HLD and s/p hysterectomy on Estrogen replacement that presents to the endocrinology clinic for follow up. Pt was last seen in clinic on 2016. Pt today reports feels well and states has been compliant with [place] thyroid, taking it before breakfast on an empty stomach.  Denies hypo or hyperthyroid symptoms.  Denies vaginal dryness, dyspareunia or hot flashes.      In regards to hypothyroidism   -Denies cold intolerance, fatigue, weight gain or dry skin     With regards to osteopenia     current medication: none    Calcium intake?  1000 mg a day   Vit D intake?  2000 iu a day   Weight bearing exercise?   Walking   Recent falls? No    No recent fractures         With regards to the vitamin d deficiency:  currently taking otc 1000 iu a day           Review of Systems   Constitutional: Negative for appetite change.   HENT: Negative for trouble swallowing.    Eyes: Negative for visual disturbance.   Respiratory: Negative for shortness of breath.    Cardiovascular: Negative for chest pain.   Gastrointestinal: Negative for constipation.   Endocrine: Negative for cold intolerance.   Skin: Negative for rash.   Hematological: Negative for adenopathy.   Psychiatric/Behavioral: Negative for agitation.       Objective:     /84   Pulse 100   Ht 5' 2" (1.575 m)   Wt 69.4 kg (153 lb)   LMP  (LMP Unknown)   BMI 27.98 kg/m²   BP Readings from Last 3 Encounters:   09/05/19 126/84   06/13/19 120/80   04/22/19 (!) 150/90     Wt Readings from Last 1 Encounters:   09/05/19 1027 69.4 kg (153 lb)     Body mass index is 27.98 kg/m².      Physical Exam   Neck: No thyromegaly present.   Cardiovascular: Normal rate.   No murmur heard.  Lymphadenopathy:     She has no cervical " adenopathy.   Psychiatric: She has a normal mood and affect.   Vitals reviewed.       Lab Review:   No results found for: HGBA1C  Lab Results   Component Value Date    CHOL 214 (H) 11/20/2018    HDL 49 11/20/2018    LDLCALC 130.0 11/20/2018    TRIG 175 (H) 11/20/2018    CHOLHDL 22.9 11/20/2018     Lab Results   Component Value Date     04/22/2019    K 3.5 04/22/2019     04/22/2019    CO2 28 04/22/2019    GLU 82 04/22/2019    BUN 14 04/22/2019    CREATININE 0.7 04/22/2019    CALCIUM 9.4 04/22/2019    PROT 7.8 04/22/2019    ALBUMIN 4.2 04/22/2019    BILITOT 0.6 04/22/2019    ALKPHOS 54 (L) 04/22/2019    AST 17 04/22/2019    ALT 15 04/22/2019    ANIONGAP 10 04/22/2019    ESTGFRAFRICA >60.0 04/22/2019    EGFRNONAA >60.0 04/22/2019    TSH 0.606 08/30/2019     Vit D, 25-Hydroxy   Date Value Ref Range Status   04/22/2019 23 (L) 30 - 96 ng/mL Final     Comment:     Vitamin D deficiency.........<10 ng/mL                              Vitamin D insufficiency......10-29 ng/mL       Vitamin D sufficiency........> or equal to 30 ng/mL  Vitamin D toxicity............>100 ng/mL         Assessment and Plan     Postablative hypothyroidism  -S/p LICEA for Graves disease on 2009, Now with post-ablative hypothyroidism   -Clinically and chemically euthyroid   -TSH 0.66       Plan:   -C/w [place] thyroid 90mg PO daily before breakfast   -TFT prior to next visit         Osteopenia  -DEXA on 2014 with evidence of osteopenia   -Denies steroid use   -Pt is s/p hysterectomy on estrogen replacement therapy     Plan:   -Will send for DEXA scan   -C/w Vitamin D 2,000 units daily   -Pt refusing to take Ca supplements     Vitamin D deficiency  -Vitamin D 23   -C/w Vitamin D 2,000 units daily     I, Janette Frey MD,  have personally taken the history and examined the patient and agree with the resident's note as stated above.    Yearly f/u

## 2019-09-16 ENCOUNTER — HOSPITAL ENCOUNTER (OUTPATIENT)
Dept: RADIOLOGY | Facility: CLINIC | Age: 59
Discharge: HOME | End: 2019-09-16
Attending: INTERNAL MEDICINE
Payer: COMMERCIAL

## 2019-09-16 DIAGNOSIS — M85.80 OSTEOPENIA DETERMINED BY X-RAY: ICD-10-CM

## 2019-09-16 PROCEDURE — 77080 DXA BONE DENSITY AXIAL: CPT | Mod: TC

## 2019-09-16 PROCEDURE — 77080 DEXA BONE DENSITY SPINE HIP: ICD-10-PCS | Mod: 26,,, | Performed by: INTERNAL MEDICINE

## 2019-09-16 PROCEDURE — 77080 DXA BONE DENSITY AXIAL: CPT | Mod: 26,,, | Performed by: INTERNAL MEDICINE

## 2019-09-20 ENCOUNTER — PATIENT MESSAGE (OUTPATIENT)
Dept: ENDOCRINOLOGY | Facility: CLINIC | Age: 59
End: 2019-09-20

## 2019-09-20 NOTE — TELEPHONE ENCOUNTER
DEXA with Osteopenia, Unchanged       BMD increased by 9.2%, and the BMD at the total hip has increased by 6.7%.      Continue with Vitamin D supplements     Repeat DEXA in 2 years

## 2019-09-26 ENCOUNTER — CLINICAL SUPPORT (OUTPATIENT)
Dept: OBSTETRICS AND GYNECOLOGY | Facility: CLINIC | Age: 59
End: 2019-09-26
Payer: COMMERCIAL

## 2019-09-26 DIAGNOSIS — Z79.890 HORMONE REPLACEMENT THERAPY (HRT): Primary | ICD-10-CM

## 2019-09-26 PROCEDURE — 96372 THER/PROPH/DIAG INJ SC/IM: CPT | Mod: S$GLB,,, | Performed by: OBSTETRICS & GYNECOLOGY

## 2019-09-26 PROCEDURE — 96372 PR INJECTION,THERAP/PROPH/DIAG2ST, IM OR SUBCUT: ICD-10-PCS | Mod: S$GLB,,, | Performed by: OBSTETRICS & GYNECOLOGY

## 2019-09-26 NOTE — PROGRESS NOTES
Here for hormone therapy injection, no complaints at this time , Injection given as ordered, tolerated well, no report of pain prior to or after injection. Return to clinic as scheduled.      Site - LB     Testosterone 76 mg  Depo Estradiol 5 mg

## 2019-10-17 ENCOUNTER — LAB VISIT (OUTPATIENT)
Dept: LAB | Facility: HOSPITAL | Age: 59
End: 2019-10-17
Attending: INTERNAL MEDICINE
Payer: COMMERCIAL

## 2019-10-17 DIAGNOSIS — E89.0 POSTABLATIVE HYPOTHYROIDISM: ICD-10-CM

## 2019-10-17 DIAGNOSIS — E55.9 VITAMIN D DEFICIENCY: ICD-10-CM

## 2019-10-17 DIAGNOSIS — I10 ESSENTIAL HYPERTENSION, MALIGNANT: Primary | ICD-10-CM

## 2019-10-17 DIAGNOSIS — E78.41 ELEVATED LIPOPROTEIN A LEVEL: ICD-10-CM

## 2019-10-17 DIAGNOSIS — Z00.01 ENCOUNTER FOR GENERAL ADULT MEDICAL EXAMINATION WITH ABNORMAL FINDINGS: ICD-10-CM

## 2019-10-17 DIAGNOSIS — N95.9 MENOPAUSAL PROBLEM: ICD-10-CM

## 2019-10-17 DIAGNOSIS — M62.89 PELVIC FLOOR TENSION: ICD-10-CM

## 2019-10-17 LAB
25(OH)D3+25(OH)D2 SERPL-MCNC: 25 NG/ML (ref 30–96)
ALBUMIN SERPL BCP-MCNC: 4.2 G/DL (ref 3.5–5.2)
ALP SERPL-CCNC: 59 U/L (ref 55–135)
ALT SERPL W/O P-5'-P-CCNC: 15 U/L (ref 10–44)
ANION GAP SERPL CALC-SCNC: 11 MMOL/L (ref 8–16)
AST SERPL-CCNC: 25 U/L (ref 10–40)
BASOPHILS # BLD AUTO: 0.07 K/UL (ref 0–0.2)
BASOPHILS NFR BLD: 0.8 % (ref 0–1.9)
BILIRUB SERPL-MCNC: 0.6 MG/DL (ref 0.1–1)
BILIRUB UR QL STRIP: NEGATIVE
BUN SERPL-MCNC: 19 MG/DL (ref 8–23)
CALCIUM SERPL-MCNC: 10 MG/DL (ref 8.7–10.5)
CHLORIDE SERPL-SCNC: 103 MMOL/L (ref 95–110)
CHOLEST SERPL-MCNC: 250 MG/DL (ref 120–199)
CHOLEST/HDLC SERPL: 5 {RATIO} (ref 2–5)
CLARITY UR REFRACT.AUTO: CLEAR
CO2 SERPL-SCNC: 27 MMOL/L (ref 23–29)
COLOR UR AUTO: YELLOW
CREAT SERPL-MCNC: 0.9 MG/DL (ref 0.5–1.4)
DIFFERENTIAL METHOD: NORMAL
EOSINOPHIL # BLD AUTO: 0.1 K/UL (ref 0–0.5)
EOSINOPHIL NFR BLD: 0.9 % (ref 0–8)
ERYTHROCYTE [DISTWIDTH] IN BLOOD BY AUTOMATED COUNT: 12.3 % (ref 11.5–14.5)
ERYTHROCYTE [SEDIMENTATION RATE] IN BLOOD BY WESTERGREN METHOD: 9 MM/HR (ref 0–36)
EST. GFR  (AFRICAN AMERICAN): >60 ML/MIN/1.73 M^2
EST. GFR  (NON AFRICAN AMERICAN): >60 ML/MIN/1.73 M^2
FERRITIN SERPL-MCNC: 61 NG/ML (ref 20–300)
GLUCOSE SERPL-MCNC: 88 MG/DL (ref 70–110)
GLUCOSE UR QL STRIP: NEGATIVE
HCT VFR BLD AUTO: 42.9 % (ref 37–48.5)
HDLC SERPL-MCNC: 50 MG/DL (ref 40–75)
HDLC SERPL: 20 % (ref 20–50)
HGB BLD-MCNC: 14.3 G/DL (ref 12–16)
HGB UR QL STRIP: ABNORMAL
IMM GRANULOCYTES # BLD AUTO: 0.03 K/UL (ref 0–0.04)
IMM GRANULOCYTES NFR BLD AUTO: 0.3 % (ref 0–0.5)
IRON SERPL-MCNC: 125 UG/DL (ref 30–160)
KETONES UR QL STRIP: NEGATIVE
LDLC SERPL CALC-MCNC: 122.2 MG/DL (ref 63–159)
LEUKOCYTE ESTERASE UR QL STRIP: NEGATIVE
LYMPHOCYTES # BLD AUTO: 3.1 K/UL (ref 1–4.8)
LYMPHOCYTES NFR BLD: 34.4 % (ref 18–48)
MCH RBC QN AUTO: 31 PG (ref 27–31)
MCHC RBC AUTO-ENTMCNC: 33.3 G/DL (ref 32–36)
MCV RBC AUTO: 93 FL (ref 82–98)
MICROSCOPIC COMMENT: ABNORMAL
MONOCYTES # BLD AUTO: 0.6 K/UL (ref 0.3–1)
MONOCYTES NFR BLD: 6.6 % (ref 4–15)
NEUTROPHILS # BLD AUTO: 5.1 K/UL (ref 1.8–7.7)
NEUTROPHILS NFR BLD: 57 % (ref 38–73)
NITRITE UR QL STRIP: NEGATIVE
NONHDLC SERPL-MCNC: 200 MG/DL
NRBC BLD-RTO: 0 /100 WBC
PH UR STRIP: 5 [PH] (ref 5–8)
PLATELET # BLD AUTO: 289 K/UL (ref 150–350)
PMV BLD AUTO: 9.9 FL (ref 9.2–12.9)
POTASSIUM SERPL-SCNC: 3.7 MMOL/L (ref 3.5–5.1)
PROT SERPL-MCNC: 7.4 G/DL (ref 6–8.4)
PROT UR QL STRIP: NEGATIVE
RBC # BLD AUTO: 4.62 M/UL (ref 4–5.4)
RBC #/AREA URNS AUTO: 7 /HPF (ref 0–4)
SATURATED IRON: 29 % (ref 20–50)
SODIUM SERPL-SCNC: 141 MMOL/L (ref 136–145)
SP GR UR STRIP: 1.03 (ref 1–1.03)
SQUAMOUS #/AREA URNS AUTO: 2 /HPF
TOTAL IRON BINDING CAPACITY: 432 UG/DL (ref 250–450)
TRANSFERRIN SERPL-MCNC: 292 MG/DL (ref 200–375)
TRANSFERRIN SERPL-MCNC: 292 MG/DL (ref 200–375)
TRIGL SERPL-MCNC: 389 MG/DL (ref 30–150)
TSH SERPL DL<=0.005 MIU/L-ACNC: 1.46 UIU/ML (ref 0.4–4)
TSH SERPL DL<=0.005 MIU/L-ACNC: 1.46 UIU/ML (ref 0.4–4)
URN SPEC COLLECT METH UR: ABNORMAL
WBC # BLD AUTO: 8.95 K/UL (ref 3.9–12.7)
WBC #/AREA URNS AUTO: 1 /HPF (ref 0–5)

## 2019-10-17 PROCEDURE — 81001 URINALYSIS AUTO W/SCOPE: CPT

## 2019-10-17 PROCEDURE — 86038 ANTINUCLEAR ANTIBODIES: CPT

## 2019-10-17 PROCEDURE — 83540 ASSAY OF IRON: CPT

## 2019-10-17 PROCEDURE — 36415 COLL VENOUS BLD VENIPUNCTURE: CPT | Mod: PO

## 2019-10-17 PROCEDURE — 84443 ASSAY THYROID STIM HORMONE: CPT

## 2019-10-17 PROCEDURE — 80061 LIPID PANEL: CPT

## 2019-10-17 PROCEDURE — 80053 COMPREHEN METABOLIC PANEL: CPT

## 2019-10-17 PROCEDURE — 85025 COMPLETE CBC W/AUTO DIFF WBC: CPT

## 2019-10-17 PROCEDURE — 82728 ASSAY OF FERRITIN: CPT

## 2019-10-17 PROCEDURE — 85652 RBC SED RATE AUTOMATED: CPT

## 2019-10-17 PROCEDURE — 82085 ASSAY OF ALDOLASE: CPT

## 2019-10-17 PROCEDURE — 82306 VITAMIN D 25 HYDROXY: CPT

## 2019-10-17 RX ORDER — AMITRIPTYLINE HYDROCHLORIDE 10 MG/1
TABLET, FILM COATED ORAL
Qty: 90 TABLET | Refills: 0 | Status: SHIPPED | OUTPATIENT
Start: 2019-10-17 | End: 2023-02-24

## 2019-10-18 ENCOUNTER — PATIENT MESSAGE (OUTPATIENT)
Dept: UROGYNECOLOGY | Facility: CLINIC | Age: 59
End: 2019-10-18

## 2019-10-18 DIAGNOSIS — R82.90 URINE ABNORMALITY: Primary | ICD-10-CM

## 2019-10-18 LAB — ANA SER QL IF: NORMAL

## 2019-10-18 NOTE — TELEPHONE ENCOUNTER
Shaggy HOWE there,    I just had a urinalysis and apparently have some blood in my urine. It's in my test results.  I assume it's probably an infection of some kind.     Can you please call in an antibiotic for me to [facility] in [address] [facility]?  I will be in [facility], but I won't be able to  until Wed afternoon since I am leaving this Monday at 8a.     If you'd like, I can do another urinalysis for you Thursday 10/24.I will be on the 4th Floor at 1p for my hormone shot.    Please let me know!

## 2019-10-18 NOTE — TELEPHONE ENCOUNTER
Patient states urinalysis showed blood. Denies urinary symptoms. Will do urine culture next week. JULIÁN PerryP-BC

## 2019-10-21 ENCOUNTER — PATIENT MESSAGE (OUTPATIENT)
Dept: ENDOCRINOLOGY | Facility: CLINIC | Age: 59
End: 2019-10-21

## 2019-10-21 DIAGNOSIS — E55.9 VITAMIN D DEFICIENCY: Primary | ICD-10-CM

## 2019-10-21 LAB — ALDOLASE SERPL-CCNC: 3.3 U/L (ref 1.2–7.6)

## 2019-10-21 RX ORDER — ERGOCALCIFEROL 1.25 MG/1
50000 CAPSULE ORAL
Qty: 4 CAPSULE | Refills: 0 | Status: SHIPPED | OUTPATIENT
Start: 2019-10-21 | End: 2020-05-28

## 2019-10-21 NOTE — TELEPHONE ENCOUNTER
TSH 1.46 WNL     Vitamin D 25 (LOW)     Plan:   -C/w [place] thyroid 90mg PO daily   -Start Vitamin D 50,000 units daily   -C/w Vitamin D 2,000 units daily

## 2019-10-24 ENCOUNTER — CLINICAL SUPPORT (OUTPATIENT)
Dept: OBSTETRICS AND GYNECOLOGY | Facility: CLINIC | Age: 59
End: 2019-10-24
Payer: COMMERCIAL

## 2019-10-24 ENCOUNTER — LAB VISIT (OUTPATIENT)
Dept: LAB | Facility: OTHER | Age: 59
End: 2019-10-24
Payer: COMMERCIAL

## 2019-10-24 DIAGNOSIS — Z79.890 HORMONE REPLACEMENT THERAPY (HRT): Primary | ICD-10-CM

## 2019-10-24 DIAGNOSIS — R82.90 URINE ABNORMALITY: ICD-10-CM

## 2019-10-24 PROCEDURE — 87086 URINE CULTURE/COLONY COUNT: CPT

## 2019-10-24 PROCEDURE — 99999 PR PBB SHADOW E&M-EST. PATIENT-LVL I: CPT | Mod: PBBFAC,,,

## 2019-10-24 PROCEDURE — 99999 PR PBB SHADOW E&M-EST. PATIENT-LVL I: ICD-10-PCS | Mod: PBBFAC,,,

## 2019-10-24 PROCEDURE — 96372 THER/PROPH/DIAG INJ SC/IM: CPT | Mod: S$GLB,,, | Performed by: OBSTETRICS & GYNECOLOGY

## 2019-10-24 PROCEDURE — 96372 PR INJECTION,THERAP/PROPH/DIAG2ST, IM OR SUBCUT: ICD-10-PCS | Mod: S$GLB,,, | Performed by: OBSTETRICS & GYNECOLOGY

## 2019-10-24 RX ORDER — TESTOSTERONE CYPIONATE 200 MG/ML
76 INJECTION, SOLUTION INTRAMUSCULAR
Status: DISCONTINUED | OUTPATIENT
Start: 2019-10-24 | End: 2019-12-10

## 2019-10-24 RX ADMIN — TESTOSTERONE CYPIONATE 76 MG: 200 INJECTION, SOLUTION INTRAMUSCULAR at 01:10

## 2019-10-25 LAB — BACTERIA UR CULT: NORMAL

## 2019-10-28 ENCOUNTER — PATIENT MESSAGE (OUTPATIENT)
Dept: UROGYNECOLOGY | Facility: CLINIC | Age: 59
End: 2019-10-28

## 2019-10-28 ENCOUNTER — LAB VISIT (OUTPATIENT)
Dept: LAB | Facility: HOSPITAL | Age: 59
End: 2019-10-28
Attending: INTERNAL MEDICINE
Payer: COMMERCIAL

## 2019-10-28 DIAGNOSIS — N95.9 MENOPAUSAL PROBLEM: ICD-10-CM

## 2019-10-28 DIAGNOSIS — Z00.01 ENCOUNTER FOR GENERAL ADULT MEDICAL EXAMINATION WITH ABNORMAL FINDINGS: ICD-10-CM

## 2019-10-28 DIAGNOSIS — I10 ESSENTIAL HYPERTENSION, MALIGNANT: ICD-10-CM

## 2019-10-28 DIAGNOSIS — E89.0 POSTABLATIVE HYPOTHYROIDISM: ICD-10-CM

## 2019-10-28 DIAGNOSIS — E78.41 ELEVATED LIPOPROTEIN A LEVEL: ICD-10-CM

## 2019-10-28 DIAGNOSIS — E55.9 VITAMIN D DEFICIENCY: ICD-10-CM

## 2019-10-28 LAB — OB PNL STL: NEGATIVE

## 2019-10-28 PROCEDURE — 82270 OCCULT BLOOD FECES: CPT

## 2019-11-21 ENCOUNTER — CLINICAL SUPPORT (OUTPATIENT)
Dept: OBSTETRICS AND GYNECOLOGY | Facility: CLINIC | Age: 59
End: 2019-11-21
Payer: COMMERCIAL

## 2019-11-21 PROCEDURE — 99999 PR PBB SHADOW E&M-EST. PATIENT-LVL I: ICD-10-PCS | Mod: PBBFAC,,,

## 2019-11-21 PROCEDURE — 96372 PR INJECTION,THERAP/PROPH/DIAG2ST, IM OR SUBCUT: ICD-10-PCS | Mod: S$GLB,,, | Performed by: OBSTETRICS & GYNECOLOGY

## 2019-11-21 PROCEDURE — 96372 THER/PROPH/DIAG INJ SC/IM: CPT | Mod: S$GLB,,, | Performed by: OBSTETRICS & GYNECOLOGY

## 2019-11-21 PROCEDURE — 99999 PR PBB SHADOW E&M-EST. PATIENT-LVL I: CPT | Mod: PBBFAC,,,

## 2019-11-21 RX ADMIN — TESTOSTERONE CYPIONATE 76 MG: 200 INJECTION, SOLUTION INTRAMUSCULAR at 01:11

## 2019-11-26 ENCOUNTER — PATIENT MESSAGE (OUTPATIENT)
Dept: ENDOCRINOLOGY | Facility: CLINIC | Age: 59
End: 2019-11-26

## 2019-11-26 DIAGNOSIS — E89.0 POSTABLATIVE HYPOTHYROIDISM: ICD-10-CM

## 2019-11-26 RX ORDER — THYROID 90 MG/1
TABLET ORAL
Qty: 90 TABLET | Refills: 2 | Status: SHIPPED | OUTPATIENT
Start: 2019-11-26 | End: 2020-05-28 | Stop reason: SDUPTHER

## 2019-12-03 ENCOUNTER — PATIENT MESSAGE (OUTPATIENT)
Dept: OBSTETRICS AND GYNECOLOGY | Facility: CLINIC | Age: 59
End: 2019-12-03

## 2019-12-05 ENCOUNTER — OFFICE VISIT (OUTPATIENT)
Dept: URGENT CARE | Facility: CLINIC | Age: 59
End: 2019-12-05
Payer: COMMERCIAL

## 2019-12-05 VITALS
OXYGEN SATURATION: 100 % | DIASTOLIC BLOOD PRESSURE: 76 MMHG | RESPIRATION RATE: 20 BRPM | WEIGHT: 153 LBS | HEART RATE: 90 BPM | BODY MASS INDEX: 28.16 KG/M2 | TEMPERATURE: 98 F | SYSTOLIC BLOOD PRESSURE: 125 MMHG | HEIGHT: 62 IN

## 2019-12-05 DIAGNOSIS — R11.0 NAUSEA: ICD-10-CM

## 2019-12-05 DIAGNOSIS — R68.83 CHILLS: ICD-10-CM

## 2019-12-05 DIAGNOSIS — J06.9 URI WITH COUGH AND CONGESTION: Primary | ICD-10-CM

## 2019-12-05 LAB
CTP QC/QA: YES
FLUAV AG NPH QL: NEGATIVE
FLUBV AG NPH QL: NEGATIVE

## 2019-12-05 PROCEDURE — 96372 THER/PROPH/DIAG INJ SC/IM: CPT | Mod: S$GLB,,, | Performed by: NURSE PRACTITIONER

## 2019-12-05 PROCEDURE — 87804 POCT INFLUENZA A/B: ICD-10-PCS | Mod: 59,QW,S$GLB, | Performed by: NURSE PRACTITIONER

## 2019-12-05 PROCEDURE — 96372 PR INJECTION,THERAP/PROPH/DIAG2ST, IM OR SUBCUT: ICD-10-PCS | Mod: S$GLB,,, | Performed by: NURSE PRACTITIONER

## 2019-12-05 PROCEDURE — 99214 PR OFFICE/OUTPT VISIT, EST, LEVL IV, 30-39 MIN: ICD-10-PCS | Mod: 25,S$GLB,, | Performed by: NURSE PRACTITIONER

## 2019-12-05 PROCEDURE — 99214 OFFICE O/P EST MOD 30 MIN: CPT | Mod: 25,S$GLB,, | Performed by: NURSE PRACTITIONER

## 2019-12-05 PROCEDURE — 87804 INFLUENZA ASSAY W/OPTIC: CPT | Mod: QW,S$GLB,, | Performed by: NURSE PRACTITIONER

## 2019-12-05 RX ORDER — ONDANSETRON 4 MG/1
4 TABLET, ORALLY DISINTEGRATING ORAL EVERY 6 HOURS PRN
Qty: 12 TABLET | Refills: 0 | Status: SHIPPED | OUTPATIENT
Start: 2019-12-05 | End: 2021-02-09

## 2019-12-05 RX ORDER — BETAMETHASONE SODIUM PHOSPHATE AND BETAMETHASONE ACETATE 3; 3 MG/ML; MG/ML
6 INJECTION, SUSPENSION INTRA-ARTICULAR; INTRALESIONAL; INTRAMUSCULAR; SOFT TISSUE
Status: COMPLETED | OUTPATIENT
Start: 2019-12-05 | End: 2019-12-05

## 2019-12-05 RX ADMIN — BETAMETHASONE SODIUM PHOSPHATE AND BETAMETHASONE ACETATE 6 MG: 3; 3 INJECTION, SUSPENSION INTRA-ARTICULAR; INTRALESIONAL; INTRAMUSCULAR; SOFT TISSUE at 10:12

## 2019-12-05 NOTE — PATIENT INSTRUCTIONS
Use an antihistamine such as Claritin, Zyrtec or Allegra to dry you out.     Use mucinex (guaifenisin) to break up mucous up to 2400mg/day to loosen any mucous. The mucinex DM pill has a cough suppressant that can be used at night to stop the tickle at the back of your throat.    Use Flonase 1-2 sprays/nostril per day. It is a local acting steroid nasal spray, if you develop a bloody nose, stop using the medication immediately.    Use warm salt water gargles to ease your throat pain. Warm salt water gargles as needed for sore throat-  1/2 tsp salt to 1 cup warm water, gargle as desired. Hot tea with honey.     Patient drink plenty of fluids and get rest.  Patient follow-up with PCP if symptoms persist or worsen.      Viral Upper Respiratory Illness (Adult)  You have a viral upper respiratory illness (URI), which is another term for the common cold. This illness is contagious during the first few days. It is spread through the air by coughing and sneezing. It may also be spread by direct contact (touching the sick person and then touching your own eyes, nose, or mouth). Frequent handwashing will decrease risk of spread. Most viral illnesses go away within 7 to 10 days with rest and simple home remedies. Sometimes the illness may last for several weeks. Antibiotics will not kill a virus, and they are generally not prescribed for this condition.    Home care  · If symptoms are severe, rest at home for the first 2 to 3 days. When you resume activity, don't let yourself get too tired.  · Avoid being exposed to cigarette smoke (yours or others).  · You may use acetaminophen or ibuprofen to control pain and fever, unless another medicine was prescribed. (Note: If you have chronic liver or kidney disease, have ever had a stomach ulcer or gastrointestinal bleeding, or are taking blood-thinning medicines, talk with your healthcare provider before using these medicines.) Aspirin should never be given to anyone under 18 years  of age who is ill with a viral infection or fever. It may cause severe liver or brain damage.  · Your appetite may be poor, so a light diet is fine. Avoid dehydration by drinking 6 to 8 glasses of fluids per day (water, soft drinks, juices, tea, or soup). Extra fluids will help loosen secretions in the nose and lungs.  · Over-the-counter cold medicines will not shorten the length of time youre sick, but they may be helpful for the following symptoms: cough, sore throat, and nasal and sinus congestion. (Note: Do not use decongestants if you have high blood pressure.)  Follow-up care  Follow up with your healthcare provider, or as advised.  When to seek medical advice  Call your healthcare provider right away if any of these occur:  · Cough with lots of colored sputum (mucus)  · Severe headache; face, neck, or ear pain  · Difficulty swallowing due to throat pain  · Fever of 100.4°F (38°C)  Call 911, or get immediate medical care  Call emergency services right away if any of these occur:  · Chest pain, shortness of breath, wheezing, or difficulty breathing  · Coughing up blood  · Inability to swallow due to throat pain  Date Last Reviewed: 9/13/2015  © 2565-3720 [employer]. [address] [place] [address], [place] [address]. All rights reserved. This information is not intended as a substitute for professional medical care. Always follow your healthcare professional's instructions.

## 2019-12-05 NOTE — PROGRESS NOTES
"Subjective:       Patient ID: Alma Jackson is a 59 y.o. female.    Vitals:  height is 5' 2" (1.575 m) and weight is 69.4 kg (153 lb). Her oral temperature is 98 °F (36.7 °C). Her blood pressure is 125/76 and her pulse is 90. Her respiration is 20 and oxygen saturation is 100%.     Chief Complaint: Sinus Problem    Patient here today with c /o PND, and runny nose, cough    Sinus Problem   This is a new problem. The current episode started yesterday. The problem has been gradually worsening since onset. There has been no fever. Her pain is at a severity of 0/10. She is experiencing no pain. Associated symptoms include congestion, coughing, headaches and sinus pressure. Pertinent negatives include no chills, diaphoresis, ear pain, hoarse voice, neck pain, shortness of breath, sneezing, sore throat or swollen glands. Past treatments include oral decongestants. The treatment provided no relief.       Constitution: Negative for chills, sweating, fatigue and fever.   HENT: Positive for congestion and sinus pressure. Negative for ear pain, sinus pain, sore throat and voice change.    Neck: Negative for neck pain and painful lymph nodes.   Eyes: Negative for eye redness.   Respiratory: Positive for cough. Negative for chest tightness, sputum production, bloody sputum, COPD, shortness of breath, stridor, wheezing and asthma.    Gastrointestinal: Negative for nausea and vomiting.   Musculoskeletal: Negative for muscle ache.   Skin: Negative for rash.   Allergic/Immunologic: Negative for seasonal allergies, asthma and sneezing.   Neurological: Positive for headaches.   Hematologic/Lymphatic: Negative for swollen lymph nodes.       Objective:      Physical Exam   Constitutional: She is oriented to person, place, and time. She appears well-developed and well-nourished. She is cooperative.  Non-toxic appearance. She does not appear ill. No distress.   HENT:   Head: Normocephalic and atraumatic.   Right Ear: Hearing, " tympanic membrane, external ear and ear canal normal.   Left Ear: Hearing, tympanic membrane, external ear and ear canal normal.   Nose: Mucosal edema and rhinorrhea present. No nasal deformity. No epistaxis. Right sinus exhibits no maxillary sinus tenderness and no frontal sinus tenderness. Left sinus exhibits no maxillary sinus tenderness and no frontal sinus tenderness.   Mouth/Throat: Uvula is midline and mucous membranes are normal. No trismus in the jaw. Normal dentition. No uvula swelling. Posterior oropharyngeal erythema and cobblestoning present.   Eyes: Conjunctivae and lids are normal. Right eye exhibits no discharge. Left eye exhibits no discharge. No scleral icterus.   Neck: Trachea normal, normal range of motion, full passive range of motion without pain and phonation normal. Neck supple.   Cardiovascular: Normal rate, regular rhythm, normal heart sounds, intact distal pulses and normal pulses.   Pulmonary/Chest: Effort normal and breath sounds normal. No respiratory distress.   Abdominal: Soft. Normal appearance and bowel sounds are normal. She exhibits no distension, no pulsatile midline mass and no mass. There is no tenderness.   Musculoskeletal: Normal range of motion. She exhibits no edema or deformity.   Neurological: She is alert and oriented to person, place, and time. She exhibits normal muscle tone. Coordination normal.   Skin: Skin is warm, dry, intact, not diaphoretic and not pale.   Psychiatric: She has a normal mood and affect. Her speech is normal and behavior is normal. Judgment and thought content normal. Cognition and memory are normal.   Nursing note and vitals reviewed.        Results for orders placed or performed in visit on 12/05/19   POCT Influenza A/B   Result Value Ref Range    Rapid Influenza A Ag Negative Negative    Rapid Influenza B Ag Negative Negative     Acceptable Yes      Assessment:       1. URI with cough and congestion    2. Chills    3. Nausea         Plan:         URI with cough and congestion  -     betamethasone acetate-betamethasone sodium phosphate injection 6 mg    Chills  -     POCT Influenza A/B    Nausea  -     ondansetron (ZOFRAN-ODT) 4 MG TbDL; Take 1 tablet (4 mg total) by mouth every 6 (six) hours as needed.  Dispense: 12 tablet; Refill: 0      Patient Instructions   Use an antihistamine such as Claritin, Zyrtec or Allegra to dry you out.     Use mucinex (guaifenisin) to break up mucous up to 2400mg/day to loosen any mucous. The mucinex DM pill has a cough suppressant that can be used at night to stop the tickle at the back of your throat.    Use Flonase 1-2 sprays/nostril per day. It is a local acting steroid nasal spray, if you develop a bloody nose, stop using the medication immediately.    Use warm salt water gargles to ease your throat pain. Warm salt water gargles as needed for sore throat-  1/2 tsp salt to 1 cup warm water, gargle as desired. Hot tea with honey.     Patient drink plenty of fluids and get rest.  Patient follow-up with PCP if symptoms persist or worsen.      Viral Upper Respiratory Illness (Adult)  You have a viral upper respiratory illness (URI), which is another term for the common cold. This illness is contagious during the first few days. It is spread through the air by coughing and sneezing. It may also be spread by direct contact (touching the sick person and then touching your own eyes, nose, or mouth). Frequent handwashing will decrease risk of spread. Most viral illnesses go away within 7 to 10 days with rest and simple home remedies. Sometimes the illness may last for several weeks. Antibiotics will not kill a virus, and they are generally not prescribed for this condition.    Home care  · If symptoms are severe, rest at home for the first 2 to 3 days. When you resume activity, don't let yourself get too tired.  · Avoid being exposed to cigarette smoke (yours or others).  · You may use acetaminophen or ibuprofen to  control pain and fever, unless another medicine was prescribed. (Note: If you have chronic liver or kidney disease, have ever had a stomach ulcer or gastrointestinal bleeding, or are taking blood-thinning medicines, talk with your healthcare provider before using these medicines.) Aspirin should never be given to anyone under 18 years of age who is ill with a viral infection or fever. It may cause severe liver or brain damage.  · Your appetite may be poor, so a light diet is fine. Avoid dehydration by drinking 6 to 8 glasses of fluids per day (water, soft drinks, juices, tea, or soup). Extra fluids will help loosen secretions in the nose and lungs.  · Over-the-counter cold medicines will not shorten the length of time youre sick, but they may be helpful for the following symptoms: cough, sore throat, and nasal and sinus congestion. (Note: Do not use decongestants if you have high blood pressure.)  Follow-up care  Follow up with your healthcare provider, or as advised.  When to seek medical advice  Call your healthcare provider right away if any of these occur:  · Cough with lots of colored sputum (mucus)  · Severe headache; face, neck, or ear pain  · Difficulty swallowing due to throat pain  · Fever of 100.4°F (38°C)  Call 911, or get immediate medical care  Call emergency services right away if any of these occur:  · Chest pain, shortness of breath, wheezing, or difficulty breathing  · Coughing up blood  · Inability to swallow due to throat pain  Date Last Reviewed: 9/13/2015  © 6759-8546 [employer]. [address] [place] [address], [place] [address]. All rights reserved. This information is not intended as a substitute for professional medical care. Always follow your healthcare professional's instructions.

## 2019-12-07 ENCOUNTER — PATIENT MESSAGE (OUTPATIENT)
Dept: OBSTETRICS AND GYNECOLOGY | Facility: CLINIC | Age: 59
End: 2019-12-07

## 2019-12-10 DIAGNOSIS — Z78.0 MENOPAUSE: Primary | ICD-10-CM

## 2019-12-10 RX ORDER — TESTOSTERONE CYPIONATE 200 MG/ML
50 INJECTION, SOLUTION INTRAMUSCULAR
Status: SHIPPED | OUTPATIENT
Start: 2019-12-11 | End: 2020-05-27

## 2019-12-11 ENCOUNTER — OFFICE VISIT (OUTPATIENT)
Dept: UROGYNECOLOGY | Facility: CLINIC | Age: 59
End: 2019-12-11
Payer: COMMERCIAL

## 2019-12-11 VITALS
BODY MASS INDEX: 28.93 KG/M2 | DIASTOLIC BLOOD PRESSURE: 80 MMHG | SYSTOLIC BLOOD PRESSURE: 140 MMHG | HEIGHT: 62 IN | WEIGHT: 157.19 LBS

## 2019-12-11 DIAGNOSIS — M62.89 PELVIC FLOOR TENSION: Primary | ICD-10-CM

## 2019-12-11 PROCEDURE — 99999 PR PBB SHADOW E&M-EST. PATIENT-LVL IV: CPT | Mod: PBBFAC,,, | Performed by: NURSE PRACTITIONER

## 2019-12-11 PROCEDURE — 3077F PR MOST RECENT SYSTOLIC BLOOD PRESSURE >= 140 MM HG: ICD-10-PCS | Mod: CPTII,S$GLB,, | Performed by: NURSE PRACTITIONER

## 2019-12-11 PROCEDURE — 99999 PR PBB SHADOW E&M-EST. PATIENT-LVL IV: ICD-10-PCS | Mod: PBBFAC,,, | Performed by: NURSE PRACTITIONER

## 2019-12-11 PROCEDURE — 3079F DIAST BP 80-89 MM HG: CPT | Mod: CPTII,S$GLB,, | Performed by: NURSE PRACTITIONER

## 2019-12-11 PROCEDURE — 3079F PR MOST RECENT DIASTOLIC BLOOD PRESSURE 80-89 MM HG: ICD-10-PCS | Mod: CPTII,S$GLB,, | Performed by: NURSE PRACTITIONER

## 2019-12-11 PROCEDURE — 3008F PR BODY MASS INDEX (BMI) DOCUMENTED: ICD-10-PCS | Mod: CPTII,S$GLB,, | Performed by: NURSE PRACTITIONER

## 2019-12-11 PROCEDURE — 99213 PR OFFICE/OUTPT VISIT, EST, LEVL III, 20-29 MIN: ICD-10-PCS | Mod: S$GLB,,, | Performed by: NURSE PRACTITIONER

## 2019-12-11 PROCEDURE — 3008F BODY MASS INDEX DOCD: CPT | Mod: CPTII,S$GLB,, | Performed by: NURSE PRACTITIONER

## 2019-12-11 PROCEDURE — 99213 OFFICE O/P EST LOW 20 MIN: CPT | Mod: S$GLB,,, | Performed by: NURSE PRACTITIONER

## 2019-12-11 PROCEDURE — 3077F SYST BP >= 140 MM HG: CPT | Mod: CPTII,S$GLB,, | Performed by: NURSE PRACTITIONER

## 2019-12-11 NOTE — PROGRESS NOTES
2019  Alma Jackson is a 58 y.o. female status post total laparoscopic hysterectomy for abnormal uterine bleeding and pelvic relaxation on 2018.   doing well. Completed pelvic floor PT with Elo Payne.  Dyspareunia is improved.  Able to have intercourse.  Amitriptyline caused eye pressure to increase.     Still having mottling/ increased in temperature  in upper arms.  Neuro workup was negative.    1. Total Laparoscopic Hysterectomy  2.  Laparoscopic Bilateral Salpingo-oophorectomy  3.  Extensive Lysis of bladder to lower uterine segment (50 minutes of dissection)   4.  Enterocele repair   5.  Rectocele repair  6.  Urethral dilation   7.  Cystourethroscopy  8.  Excision of umbilical lesion      Incontinence episode: ADDISON with sneeze-- no difference since surgery--improved with PT   OAB symptoms: No   Incomplete emptying: No  Prolapse symptoms: No        Review of patient's allergies indicates:   Allergen Reactions    Azithromycin (bulk) Rash     z-pack      Past Medical History:   Diagnosis Date    Cataract     Fibromyalgia     Hx of colonic polyps     Hypertension     Hyperthyroidism     Hypothyroidism     Irregular heart beat     Ophthalmic Graves disease      Past Surgical History:   Procedure Laterality Date    CANTHOPLASTY Bilateral 2012    Performed [facility] at [facility] 1ST FLR     SECTION, CLASSIC      COLONOSCOPY N/A 2016    Procedure: COLONOSCOPY;  Surgeon: Benny Menchaca MD;  [facility]  Service: Endoscopy;  Laterality: N/A;  requests Dr Menchaca    COLONOSCOPY N/A 2016    [facility]    COLPORRHAPHY, POSTERIOR N/A 12/10/2018    Performed by Pinky Ko DO at [facility] [place]    CYSTOSCOPY N/A 12/10/2018    Procedure: CYSTOSCOPY;  Surgeon: Pinky Ko DO;  Location: [facility] [place];  Service: OB/GYN;  Laterality: N/A;    CYSTOSCOPY N/A 12/10/2018     Performed by Pinky Ko DO at [facility] OR    DECOMPRESSION, ORBIT Bilateral 11/29/2012    Performed [facility] at [facility] 1ST FLR    DILATION OF URETHRA N/A 12/10/2018    Procedure: DILATION, URETHRA;  Surgeon: Pinky Ko DO;  Location: [facility] OR;  Service: OB/GYN;  Laterality: N/A;    DILATION, URETHRA N/A 12/10/2018    Performed by Pinky Ko DO at [facility] OR    ENTEROCELE REPAIR N/A 12/10/2018    Procedure: REPAIR, ENTEROCELE;  Surgeon: Pinky Ko DO;  Location: [facility] OR;  Service: OB/GYN;  Laterality: N/A;    EYE SURGERY      HYSTERECTOMY, TOTAL, LAPAROSCOPIC; excision of umbilical mole N/A 12/10/2018    Performed by Pinky Ko DO at [facility] OR    LAPAROSCOPIC SALPINGO-OOPHORECTOMY Bilateral 12/10/2018    Procedure: SALPINGO-OOPHORECTOMY, LAPAROSCOPIC;  Surgeon: Pinky Ko DO;  Location: [facility] OR;  Service: OB/GYN;  Laterality: Bilateral;    LAPAROSCOPIC TOTAL HYSTERECTOMY N/A 12/10/2018    Procedure: HYSTERECTOMY, TOTAL, LAPAROSCOPIC; excision of umbilical mole;  Surgeon: Pinky Ko DO;  Location: [facility] OR;  Service: OB/GYN;  Laterality: N/A;    lasik ou      POSTERIOR REPAIR N/A 12/10/2018    Procedure: COLPORRHAPHY, POSTERIOR;  Surgeon: Pinky Ko DO;  Location: [facility] OR;  Service: OB/GYN;  Laterality: N/A;    REPAIR, ENTEROCELE N/A 12/10/2018    Performed by Pinky Ko DO at [facility] OR    REPAIR, RETRACTION, EYELID Bilateral 11/29/2012    Performed by [facility] at [facility] 1ST FLR    SALPINGO-OOPHORECTOMY, LAPAROSCOPIC Bilateral 12/10/2018    Performed by Pinky Ko DO at [facility] OR    thyroid radiation      TUBAL LIGATION       Family History   Problem Relation Age of Onset    Heart failure Mother     Thyroid disease Mother     Stroke Mother     Parkinsonism Father     Hypertension Maternal Grandmother     Stroke Maternal Grandmother     Diabetes Maternal  Grandfather     Stroke Paternal Grandfather     Amblyopia Neg Hx     Blindness Neg Hx     Cancer Neg Hx     Cataracts Neg Hx     Glaucoma Neg Hx     Macular degeneration Neg Hx     Retinal detachment Neg Hx     Strabismus Neg Hx     Breast cancer Neg Hx     Colon cancer Neg Hx     Ovarian cancer Neg Hx        Current Outpatient Medications on File Prior to Visit   Medication Sig Dispense Refill    ARMOUR THYROID 90 mg Tab TAKE 1 TABLET(90 MG) BY MOUTH BEFORE BREAKFAST 90 tablet 0    baclofen (LIORESAL) 10 MG tablet Take 10 mg by mouth as needed.  1    baclofen (LIORESAL) 10 MG tablet Take 10 mg by mouth.      clonazePAM (KLONOPIN) 0.5 MG tablet Take 0.25 tablets by mouth.      cyclobenzaprine (FLEXERIL) 10 MG tablet TK 1 T PO QHS  1    cyclobenzaprine (FLEXERIL) 10 MG tablet TK 1 T PO QHS      diltiaZEM (CARDIZEM) 30 MG tablet every 12 (twelve) hours.   3    diltiaZEM (CARDIZEM) 30 MG tablet Take 30 mg by mouth.      diphth,pertus,acell,,tetanus (BOOSTRIX) 2.5-8-5 Lf-mcg-Lf/0.5mL Syrg injection Inject into the muscle. 0.5 mL 0    fluocinolone acetonide oil 0.01 % Drop GAL AA BID  2    fluocinolone acetonide oil 0.01 % Drop GAL AA BID      hydrochlorothiazide (HYDRODIURIL) 25 MG tablet Take 1 tablet (25 mg total) by mouth once daily. (Patient taking differently: Take 25 mg by mouth as needed. ) 90 tablet 3    hydroCHLOROthiazide (HYDRODIURIL) 25 MG tablet Take 25 mg by mouth.      HYDROcodone-acetaminophen (NORCO) 5-325 mg per tablet Take 1 tablet by mouth every 6 (six) hours as needed for Pain. 20 tablet 0    hydrocodone-acetaminophen 5-325mg (NORCO) 5-325 mg per tablet TK 1 T PO  QHS PRF SEVERE NIGHT TIME PRN ACUTE PAIN  0    ibuprofen (ADVIL,MOTRIN) 800 MG tablet Take 1 tablet (800 mg total) by mouth every 8 (eight) hours as needed for Pain. 30 tablet 1    nitrofurantoin, macrocrystal-monohydrate, (MACROBID) 100 MG capsule Take 1 capsule (100 mg total) by mouth 2 (two) times daily. 14  "capsule 0    nitrofurantoin, macrocrystal-monohydrate, (MACROBID) 100 MG capsule Take 1 capsule (100 mg total) by mouth 2 (two) times daily. 14 capsule 0    nystatin-triamcinolone (MYCOLOG II) cream Apply to affected area 2 times daily      ondansetron (ZOFRAN-ODT) 4 MG TbDL Take 1 tablet (4 mg total) by mouth every 6 (six) hours as needed. 20 tablet 0    phenazopyridine (PYRIDIUM) 200 MG tablet Take 200 mg by mouth.      thyroid, pork, (ARMOUR THYROID) 90 mg Tab Take 1 tablet by mouth.       Current Facility-Administered Medications on File Prior to Visit   Medication Dose Route Frequency Provider Last Rate Last Dose    estradiol cypionate 5 mg/mL injection 5 mg  5 mg Intramuscular Q28 Days Ruby Villegas MD   5 mg at 12/19/18 1318    testosterone cypionate injection 50 mg  50 mg Intramuscular Q28 Days Ruby Villegas MD   50 mg at 12/19/18 1318         BP (!) 140/80 (BP Location: Left arm, Patient Position: Sitting, BP Method: Medium (Manual))   Ht 5' 2" (1.575 m)   Wt 71.3 kg (157 lb 3 oz)   LMP  (LMP Unknown)   BMI 28.75 kg/m²   Abdomen soft, nontender.       Pelvic deferred per patient repquest    1. Pelvic floor tension       Plan:  1.  no lifting > 50 pounds without assitance.  2. continue pelvic floor PT home exercise program  3. Follow up with Dr. Villegas for annual exams    Approximately 30 min were spent in consult, 90 % in discussion.    Archana JOSHI [facility]and [facility]  Division of Female Pelvic Medicine and Reconstructive Surgery  Department of Obstetrics & Gynecology      "

## 2019-12-11 NOTE — PATIENT INSTRUCTIONS
1.  no lifting > 50 pounds without assitance.  2 continue pelvic floor PT home exercise program  3. Follow up with Dr. Villegas for annual exams

## 2019-12-19 ENCOUNTER — CLINICAL SUPPORT (OUTPATIENT)
Dept: OBSTETRICS AND GYNECOLOGY | Facility: CLINIC | Age: 59
End: 2019-12-19
Payer: COMMERCIAL

## 2019-12-19 PROCEDURE — 99999 PR PBB SHADOW E&M-EST. PATIENT-LVL I: CPT | Mod: PBBFAC,,,

## 2019-12-19 PROCEDURE — 96372 PR INJECTION,THERAP/PROPH/DIAG2ST, IM OR SUBCUT: ICD-10-PCS | Mod: S$GLB,,, | Performed by: OBSTETRICS & GYNECOLOGY

## 2019-12-19 PROCEDURE — 99999 PR PBB SHADOW E&M-EST. PATIENT-LVL I: ICD-10-PCS | Mod: PBBFAC,,,

## 2019-12-19 PROCEDURE — 96372 THER/PROPH/DIAG INJ SC/IM: CPT | Mod: S$GLB,,, | Performed by: OBSTETRICS & GYNECOLOGY

## 2019-12-19 RX ADMIN — TESTOSTERONE CYPIONATE 50 MG: 200 INJECTION, SOLUTION INTRAMUSCULAR at 01:12

## 2019-12-19 NOTE — PROGRESS NOTES
Here for hormone therapy injection, no complaints at this time, Injection given as ordered, tolerated well, no report of pain prior to or after injection. Return to clinic as scheduled.     Site - RB    Testosterone 50 mg  Depo Estradiol 5 mg    Clinic Supplied Medication

## 2020-01-16 ENCOUNTER — CLINICAL SUPPORT (OUTPATIENT)
Dept: OBSTETRICS AND GYNECOLOGY | Facility: CLINIC | Age: 60
End: 2020-01-16
Payer: COMMERCIAL

## 2020-01-16 ENCOUNTER — TELEPHONE (OUTPATIENT)
Dept: OBSTETRICS AND GYNECOLOGY | Facility: CLINIC | Age: 60
End: 2020-01-16

## 2020-01-16 DIAGNOSIS — N95.1 MENOPAUSAL SYNDROME (HOT FLUSHES): Primary | ICD-10-CM

## 2020-01-16 DIAGNOSIS — Z78.0 MENOPAUSE: Primary | ICD-10-CM

## 2020-01-16 PROCEDURE — 96372 PR INJECTION,THERAP/PROPH/DIAG2ST, IM OR SUBCUT: ICD-10-PCS | Mod: S$GLB,,, | Performed by: OBSTETRICS & GYNECOLOGY

## 2020-01-16 PROCEDURE — 96372 THER/PROPH/DIAG INJ SC/IM: CPT | Mod: S$GLB,,, | Performed by: OBSTETRICS & GYNECOLOGY

## 2020-01-16 PROCEDURE — 99999 PR PBB SHADOW E&M-EST. PATIENT-LVL I: ICD-10-PCS | Mod: PBBFAC,,,

## 2020-01-16 PROCEDURE — 99999 PR PBB SHADOW E&M-EST. PATIENT-LVL I: CPT | Mod: PBBFAC,,,

## 2020-01-16 RX ADMIN — TESTOSTERONE CYPIONATE 50 MG: 200 INJECTION, SOLUTION INTRAMUSCULAR at 01:01

## 2020-01-16 NOTE — TELEPHONE ENCOUNTER
----- Message from Nicole Hodgson sent at 1/16/2020  1:10 PM CST -----  Contact: self  Pt needs orders in for hormone labs. Please call when done

## 2020-01-16 NOTE — PROGRESS NOTES
Here for hormone therapy injection, no complaints at this time, Injection given as ordered, tolerated well, no report of pain prior to or after injection. Return to clinic as scheduled.     Site -  LB    Testosterone 50 mg  Depo Estradiol 5 mg    Clinic Supplied Medication

## 2020-02-03 ENCOUNTER — LAB VISIT (OUTPATIENT)
Dept: LAB | Facility: HOSPITAL | Age: 60
End: 2020-02-03
Attending: OBSTETRICS & GYNECOLOGY
Payer: COMMERCIAL

## 2020-02-03 DIAGNOSIS — N95.1 MENOPAUSAL SYNDROME (HOT FLUSHES): ICD-10-CM

## 2020-02-03 LAB — ESTRADIOL SERPL-MCNC: 70 PG/ML

## 2020-02-03 PROCEDURE — 84402 ASSAY OF FREE TESTOSTERONE: CPT

## 2020-02-03 PROCEDURE — 82670 ASSAY OF TOTAL ESTRADIOL: CPT

## 2020-02-06 LAB — TESTOST FREE SERPL-MCNC: 2.4 PG/ML

## 2020-02-11 ENCOUNTER — PATIENT MESSAGE (OUTPATIENT)
Dept: OBSTETRICS AND GYNECOLOGY | Facility: CLINIC | Age: 60
End: 2020-02-11

## 2020-02-13 ENCOUNTER — CLINICAL SUPPORT (OUTPATIENT)
Dept: OBSTETRICS AND GYNECOLOGY | Facility: CLINIC | Age: 60
End: 2020-02-13
Payer: COMMERCIAL

## 2020-02-13 DIAGNOSIS — N95.1 MENOPAUSAL SYNDROME (HOT FLUSHES): Primary | ICD-10-CM

## 2020-02-13 PROCEDURE — 99999 PR PBB SHADOW E&M-EST. PATIENT-LVL I: CPT | Mod: PBBFAC,,,

## 2020-02-13 PROCEDURE — 96372 PR INJECTION,THERAP/PROPH/DIAG2ST, IM OR SUBCUT: ICD-10-PCS | Mod: S$GLB,,, | Performed by: OBSTETRICS & GYNECOLOGY

## 2020-02-13 PROCEDURE — 96372 THER/PROPH/DIAG INJ SC/IM: CPT | Mod: S$GLB,,, | Performed by: OBSTETRICS & GYNECOLOGY

## 2020-02-13 PROCEDURE — 99999 PR PBB SHADOW E&M-EST. PATIENT-LVL I: ICD-10-PCS | Mod: PBBFAC,,,

## 2020-02-13 RX ADMIN — TESTOSTERONE CYPIONATE 50 MG: 200 INJECTION, SOLUTION INTRAMUSCULAR at 01:02

## 2020-03-16 ENCOUNTER — TELEPHONE (OUTPATIENT)
Dept: OBSTETRICS AND GYNECOLOGY | Facility: CLINIC | Age: 60
End: 2020-03-16

## 2020-03-16 DIAGNOSIS — Z78.0 MENOPAUSE: Primary | ICD-10-CM

## 2020-03-16 NOTE — TELEPHONE ENCOUNTER
Spoke with patient and informed her of the recommendations from [facility] to reschedule non essential appointments. Patient requested information on what form of  Medication to use if she's unable to have injection.

## 2020-03-18 RX ORDER — ESTERIFIED ESTROGEN AND METHYLTESTOSTERONE .625; 1.25 MG/1; MG/1
1 TABLET ORAL DAILY
Qty: 30 TABLET | Refills: 5 | Status: SHIPPED | OUTPATIENT
Start: 2020-03-18 | End: 2021-04-06

## 2020-04-23 ENCOUNTER — OFFICE VISIT (OUTPATIENT)
Dept: OBSTETRICS AND GYNECOLOGY | Facility: CLINIC | Age: 60
End: 2020-04-23
Attending: OBSTETRICS & GYNECOLOGY
Payer: COMMERCIAL

## 2020-04-23 DIAGNOSIS — E89.41 SURGICAL MENOPAUSE, SYMPTOMATIC: Primary | ICD-10-CM

## 2020-04-23 DIAGNOSIS — Z79.890 POSTMENOPAUSAL HORMONE REPLACEMENT THERAPY: ICD-10-CM

## 2020-04-23 PROCEDURE — 99214 OFFICE O/P EST MOD 30 MIN: CPT | Mod: 95,,, | Performed by: OBSTETRICS & GYNECOLOGY

## 2020-04-23 PROCEDURE — 99214 PR OFFICE/OUTPT VISIT, EST, LEVL IV, 30-39 MIN: ICD-10-PCS | Mod: 95,,, | Performed by: OBSTETRICS & GYNECOLOGY

## 2020-04-23 RX ORDER — FINASTERIDE 5 MG/1
5 TABLET, FILM COATED ORAL DAILY
COMMUNITY
Start: 2020-03-04

## 2020-04-23 NOTE — PROGRESS NOTES
The patient location is: Home    The chief complaint leading to consultation is: Postmenopausal hormone replacement therapy  Visit type: audiovisual  Total time spent with patient: 30 minutes  Each patient to whom he or she provides medical services by telemedicine is:  (1) informed of the relationship between the physician and patient and the respective role of any other health care provider with respect to management of the patient; and (2) notified that he or she may decline to receive medical services by telemedicine and may withdraw from such care at any time.    Notes:     Subjective:       Patient ID: Alma Jackson is a 59 y.o. female.    Chief Complaint:  Menopause (Hormone therapy)      History of Present Illness  HPI  The patient presents today for transfer of care for hormone management and GYN care from her provider who is [place] state. She has been on hormone therapy for many years , originally with Dr. Raffaele Land, then more recently with Dr. Villegas. She underwent TLHBSO with rectocele repair in October 2018 and has since been well managed on injections of Estradiol and Testosterone with 300mg Progesterone orally. She has recently been diagnosed with an autoimmune disease causing hair loss and is undergoing treatment for this. She states that her Dermatologist recommended possibly decreasing her Testosterone dose (this was decreased from 76 mg monthly to 50 mg in December 2019). Due to the Covid Pandemic, in an effort to decrease exposure, her injections have been suspended temporarily and she was prescribed Estratest HS which she is currently taking and continues to take the 300mg compounded Prometrium from [facility]. She is also on Armourthyroid as prescribed by her Endocrinologist.     Other pertinent meds include: HCTZ and Diltiazem for HTN  Doxycycline for scalp condition, Finasteride for scalp condition    She has recently developed Hyperlipidemia (October 2019),(has  elevated total cholesterol and elevated Triglycerides) and has seen a Cardiologist who has advised Fish Oil and Folate , and recommended dietary and activity changes to improve these values, but has not added a statin agent at this time.     Last Mammogram: 2018: Normal  Last Dexa:  Mild Osteopenia  Last CBC: 14/42 in 2019      GYN & OB History  No LMP recorded (lmp unknown). Patient is postmenopausal.   Date of Last Pap: 2018    OB History    Para Term  AB Living   3 3 3         SAB TAB Ectopic Multiple Live Births                  # Outcome Date GA Lbr Arvind/2nd Weight Sex Delivery Anes PTL Lv   3 Term            2 Term            1 Term                Past Medical History:   Diagnosis Date    Cataract     Fibromyalgia     Hx of colonic polyps     Hypertension     Hyperthyroidism     Hypothyroidism     Irregular heart beat     Ophthalmic Graves disease        Past Surgical History:   Procedure Laterality Date     SECTION, CLASSIC      COLONOSCOPY N/A 2016    Procedure: COLONOSCOPY;  Surgeon: Benny Menchaca MD;  Location: [address]);  Service: Endoscopy;  Laterality: N/A;  requests Dr Menchaca    CYSTOSCOPY N/A 12/10/2018    Procedure: CYSTOSCOPY;  Surgeon: Pinky Ko DO;  Location: [facility];  Service: OB/GYN;  Laterality: N/A;    DILATION OF URETHRA N/A 12/10/2018    Procedure: DILATION, URETHRA;  Surgeon: Pinky Ko DO;  Location: [facility] OR;  Service: OB/GYN;  Laterality: N/A;    ENTEROCELE REPAIR N/A 12/10/2018    Procedure: REPAIR, ENTEROCELE;  Surgeon: Pinky Ko DO;  Location: [facility] OR;  Service: OB/GYN;  Laterality: N/A;    EYE SURGERY      LAPAROSCOPIC SALPINGO-OOPHORECTOMY Bilateral 12/10/2018    Procedure: SALPINGO-OOPHORECTOMY, LAPAROSCOPIC;  Surgeon: Pinky Ko DO;  Location: [facility] OR;  Service: OB/GYN;  Laterality: Bilateral;    LAPAROSCOPIC TOTAL HYSTERECTOMY N/A 12/10/2018    Procedure:  HYSTERECTOMY, TOTAL, LAPAROSCOPIC; excision of umbilical mole;  Surgeon: Pinky Ko DO;  Location: [facility] OR;  Service: OB/GYN;  Laterality: N/A;    lasik ou      POSTERIOR REPAIR N/A 12/10/2018    Procedure: COLPORRHAPHY, POSTERIOR;  Surgeon: Pinky Ko DO;  Location: [facility] OR;  Service: OB/GYN;  Laterality: N/A;    thyroid radiation      TUBAL LIGATION         Review of Systems  Review of Systems        Objective:      LMP  (LMP Unknown)   Physical Exam         Assessment:        1. Surgical menopause, symptomatic    2. Postmenopausal hormone replacement therapy               Plan:      1. Surgical menopause, symptomatic  Discussed with patient risks/benefits of continuing HRT , as well as significant health benefits of continuing HRT . Advised that will continue oral meds for the short term until we can transition back to transdermal /injection therapy, as she was well controlled on Estradiol 5 and Testosterone 50 with good blood levels and symptom free with those doses.   Plan on repeat labs in about 2-3 weeks .      - Testosterone, free; Future  - Estradiol; Future  - Progesterone; Future    2. Postmenopausal hormone replacement therapy    Continue Estratest HS for now, will transition back to Testosterone 50 with Estradiol 5mg IM q month. Can consider hormone pellets at that time. Discussed pros/cons of remaining on Prometrium as she has now had a hysterectomy.        Follow up in about 2 weeks (around 5/7/2020), or if symptoms worsen or fail to improve, for labs.

## 2020-05-04 ENCOUNTER — PATIENT MESSAGE (OUTPATIENT)
Dept: OBSTETRICS AND GYNECOLOGY | Facility: CLINIC | Age: 60
End: 2020-05-04

## 2020-05-05 PROBLEM — L66.10 LICHEN PLANOPILARIS: Status: ACTIVE | Noted: 2020-05-05

## 2020-05-05 PROBLEM — L66.1 LICHEN PLANOPILARIS: Status: ACTIVE | Noted: 2020-05-05

## 2020-05-05 NOTE — TELEPHONE ENCOUNTER
Please schedule for her to get hormone levels in [place] at [facility] this week

## 2020-05-08 ENCOUNTER — LAB VISIT (OUTPATIENT)
Dept: LAB | Facility: OTHER | Age: 60
End: 2020-05-08
Attending: OBSTETRICS & GYNECOLOGY
Payer: COMMERCIAL

## 2020-05-08 DIAGNOSIS — E89.41 SURGICAL MENOPAUSE, SYMPTOMATIC: ICD-10-CM

## 2020-05-08 LAB
ESTRADIOL SERPL-MCNC: 27 PG/ML
PROGEST SERPL-MCNC: 13 NG/ML

## 2020-05-08 PROCEDURE — 84144 ASSAY OF PROGESTERONE: CPT

## 2020-05-08 PROCEDURE — 82670 ASSAY OF TOTAL ESTRADIOL: CPT

## 2020-05-08 PROCEDURE — 84402 ASSAY OF FREE TESTOSTERONE: CPT

## 2020-05-11 LAB — TESTOST FREE SERPL-MCNC: 1.7 PG/ML

## 2020-05-20 ENCOUNTER — PATIENT MESSAGE (OUTPATIENT)
Dept: OBSTETRICS AND GYNECOLOGY | Facility: CLINIC | Age: 60
End: 2020-05-20

## 2020-05-20 DIAGNOSIS — N95.1 MENOPAUSAL SYNDROME: Primary | ICD-10-CM

## 2020-05-20 RX ORDER — TESTOSTERONE CYPIONATE 200 MG/ML
50 INJECTION, SOLUTION INTRAMUSCULAR
Status: SHIPPED | OUTPATIENT
Start: 2020-05-21 | End: 2020-11-05

## 2020-05-25 ENCOUNTER — OFFICE VISIT (OUTPATIENT)
Dept: ENDOCRINOLOGY | Facility: CLINIC | Age: 60
End: 2020-05-25
Attending: INTERNAL MEDICINE
Payer: COMMERCIAL

## 2020-05-25 DIAGNOSIS — E55.9 VITAMIN D DEFICIENCY: ICD-10-CM

## 2020-05-25 DIAGNOSIS — E89.0 POSTABLATIVE HYPOTHYROIDISM: Primary | ICD-10-CM

## 2020-05-25 DIAGNOSIS — M85.80 OSTEOPENIA, UNSPECIFIED LOCATION: ICD-10-CM

## 2020-05-25 PROCEDURE — 99214 OFFICE O/P EST MOD 30 MIN: CPT | Mod: 95,,, | Performed by: INTERNAL MEDICINE

## 2020-05-25 PROCEDURE — 99214 PR OFFICE/OUTPT VISIT, EST, LEVL IV, 30-39 MIN: ICD-10-PCS | Mod: 95,,, | Performed by: INTERNAL MEDICINE

## 2020-05-25 NOTE — ASSESSMENT & PLAN NOTE
--Clinically euthyroid and biochemically euthyroid by last TSH  --Will repeat TFT's now  --Reviewed TSH goals, and that goal is for a normal TSH  --Continue [place] thyroid 90 mg 6 days per week pending repeat TSH

## 2020-05-25 NOTE — ASSESSMENT & PLAN NOTE
--No history of fracture  --No falls  --On adequate calcium and vitamin D supplementation  --On HRT which will protect bones  --Repeat BMD in 9/2021

## 2020-05-25 NOTE — PROGRESS NOTES
Subjective:      Patient ID: Alma Jackson is a 59 y.o. female.    Chief Complaint:  Hypothyroidism    The patient location is: Home  The chief complaint leading to consultation is: Hypothyroidism    Visit type: audiovisual    Face to Face time with patient: 25 min  30 minutes of total time spent on the encounter, which includes face to face time and non-face to face time preparing to see the patient (eg, review of tests), Obtaining and/or reviewing separately obtained history, Documenting clinical information in the electronic or other health record, Independently interpreting results (not separately reported) and communicating results to the patient/family/caregiver, or Care coordination (not separately reported).       Each patient to whom he or she provides medical services by telemedicine is:  (1) informed of the relationship between the physician and patient and the respective role of any other health care provider with respect to management of the patient; and (2) notified that he or she may decline to receive medical services by telemedicine and may withdraw from such care at any time.      History of Present Illness  Ms. Jackson presents for follow up of hypothyroidism, osteopenia and vitamin D deficiency. Last visit with Reuben Garibay and Tk in 9/2019. Previous patient of Dr. Rodriguez.     Has active history of hypothyroidism, vit D def, osteopenia, and menopause on HRT.     Has history of Grave's disease s/p total thyroidectomy and LICEA ablation several years ago. 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 Carreon in 2012.       In regards to hypothyroidism   -Denies cold intolerance, fatigue, weight gain or dry skin      Results for ALMA JACKSON (MRN [id number]) as of 5/25/2020 13:12   Ref. Range 10/17/2019 15:40   TSH Latest Ref Range: 0.400 - 4.000 uIU/mL 1.461     Currently on [place] thyroid 90 mg 6 days per week.   Denies any tremor or  "increased anxiousness. Has chronic intermittent palpitations which she reports started several years ago after she had "broken-heart syndrome" and she follows with cardiology.     With regards to osteopenia      current medication: none    Calcium intake?  1000 mg a day   Vit D intake?  5000 iu a day   Weight bearing exercise?   Walking   Recent falls? No    No recent fractures     Last bone density 9/2019:  Lumbar Spine: Lumbar bone mineral density L1-L4 is 1.224g/cm2, which is a T-score of 1.7. The Z-score is 3.2.  Note: L3 was excluded from this study to allow comparison to the previous study in 2014 which also excluded L3.    Total Hip: Total hip bone mineral density is 0.863g/cm2.  The T-score is -0.6, and the Z-score is 0.3.    Femoral neck: Bone mineral density is 0.674g/cm2 and the T-score is -1.6 and the Z-score is -0.3 g/cm2.    There is a 6.9% risk of a major osteoporotic fracture and a 0.5% risk of hip fracture in the next 10 years (FRAX adjusted with Trabecular Bone Score).    Compared with previous DXA, BMD at the lumbar spine has increased by 9.2%, and the BMD at the total hip has increased by 6.7%.         With regards to the vitamin d deficiency:  currently taking otc 5000 iu a day    Review of Systems   Constitutional: Negative for chills and fever.   Gastrointestinal: Negative for nausea.   No CP  No SOB    Objective:   Physical Exam   Nursing note and vitals reviewed.    BP Readings from Last 3 Encounters:   12/11/19 (!) 140/80   12/05/19 125/76   09/05/19 126/84     Wt Readings from Last 1 Encounters:   12/11/19 1654 71.3 kg (157 lb 3 oz)       There is no height or weight on file to calculate BMI.    Lab Review:   No results found for: HGBA1C  Lab Results   Component Value Date    CHOL 250 (H) 10/17/2019    HDL 50 10/17/2019    LDLCALC 122.2 10/17/2019    TRIG 389 (H) 10/17/2019    CHOLHDL 20.0 10/17/2019     Lab Results   Component Value Date     10/17/2019    K 3.7 10/17/2019     " 10/17/2019    CO2 27 10/17/2019    GLU 88 10/17/2019    BUN 19 10/17/2019    CREATININE 0.9 10/17/2019    CALCIUM 10.0 10/17/2019    PROT 7.4 10/17/2019    ALBUMIN 4.2 10/17/2019    BILITOT 0.6 10/17/2019    ALKPHOS 59 10/17/2019    AST 25 10/17/2019    ALT 15 10/17/2019    ANIONGAP 11 10/17/2019    ESTGFRAFRICA >60.0 10/17/2019    EGFRNONAA >60.0 10/17/2019    TSH 1.461 10/17/2019    TSH 1.461 10/17/2019         Assessment and Plan     Hypothyroidism  --Clinically euthyroid and biochemically euthyroid by last TSH  --Will repeat TFT's now  --Reviewed TSH goals, and that goal is for a normal TSH  --Continue [place] thyroid 90 mg 6 days per week pending repeat TSH    Vitamin D deficiency  --Currently on ergo 41979 monthly and D3 OTC at 5000 units daily  --Repeat D level now, may be able to stop ergocalciferol    Osteopenia  --No history of fracture  --No falls  --On adequate calcium and vitamin D supplementation  --On HRT which will protect bones  --Repeat BMD in 9/2021    Labs now, follow up in one year    Torres Sarmiento M.D. Staff Endocrinology

## 2020-05-25 NOTE — ASSESSMENT & PLAN NOTE
--Currently on ergo 12795 monthly and D3 OTC at 5000 units daily  --Repeat D level now, may be able to stop ergocalciferol

## 2020-05-27 ENCOUNTER — CLINICAL SUPPORT (OUTPATIENT)
Dept: OBSTETRICS AND GYNECOLOGY | Facility: CLINIC | Age: 60
End: 2020-05-27
Payer: COMMERCIAL

## 2020-05-27 ENCOUNTER — LAB VISIT (OUTPATIENT)
Dept: LAB | Facility: OTHER | Age: 60
End: 2020-05-27
Attending: INTERNAL MEDICINE
Payer: COMMERCIAL

## 2020-05-27 DIAGNOSIS — M85.80 OSTEOPENIA, UNSPECIFIED LOCATION: ICD-10-CM

## 2020-05-27 DIAGNOSIS — E89.0 POSTABLATIVE HYPOTHYROIDISM: ICD-10-CM

## 2020-05-27 DIAGNOSIS — N95.1 MENOPAUSAL SYNDROME (HOT FLUSHES): Primary | ICD-10-CM

## 2020-05-27 DIAGNOSIS — E55.9 VITAMIN D DEFICIENCY: ICD-10-CM

## 2020-05-27 LAB
25(OH)D3+25(OH)D2 SERPL-MCNC: 77 NG/ML (ref 30–96)
T3 SERPL-MCNC: 133 NG/DL (ref 60–180)
T4 FREE SERPL-MCNC: 0.81 NG/DL (ref 0.71–1.51)
TSH SERPL DL<=0.005 MIU/L-ACNC: 3 UIU/ML (ref 0.4–4)

## 2020-05-27 PROCEDURE — 82306 VITAMIN D 25 HYDROXY: CPT

## 2020-05-27 PROCEDURE — 84480 ASSAY TRIIODOTHYRONINE (T3): CPT

## 2020-05-27 PROCEDURE — 96372 THER/PROPH/DIAG INJ SC/IM: CPT | Mod: S$GLB,,, | Performed by: OBSTETRICS & GYNECOLOGY

## 2020-05-27 PROCEDURE — 84439 ASSAY OF FREE THYROXINE: CPT

## 2020-05-27 PROCEDURE — 96372 PR INJECTION,THERAP/PROPH/DIAG2ST, IM OR SUBCUT: ICD-10-PCS | Mod: S$GLB,,, | Performed by: OBSTETRICS & GYNECOLOGY

## 2020-05-27 PROCEDURE — 84443 ASSAY THYROID STIM HORMONE: CPT

## 2020-05-27 PROCEDURE — 36415 COLL VENOUS BLD VENIPUNCTURE: CPT

## 2020-05-27 RX ORDER — TESTOSTERONE CYPIONATE 200 MG/ML
76 INJECTION, SOLUTION INTRAMUSCULAR
Status: COMPLETED | OUTPATIENT
Start: 2020-05-27 | End: 2020-05-27

## 2020-05-27 RX ORDER — DOXYCYCLINE HYCLATE 100 MG
100 TABLET ORAL 2 TIMES DAILY
COMMUNITY
End: 2021-02-09 | Stop reason: SDUPTHER

## 2020-05-27 RX ADMIN — TESTOSTERONE CYPIONATE 76 MG: 200 INJECTION, SOLUTION INTRAMUSCULAR at 02:05

## 2020-05-27 NOTE — PROGRESS NOTES
Patient arrives for her monthly hormone injection, restarting post COVID-19 precautions. MD orders received to restart with Depo-Estradiol 5mg and Testosterone 76mg. 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 Patient may consider discussing Aldactone in place of HCTZ with her cardiologist.      Depo-Estradiol 5mg and Testosterone 76mg administered IM to RIGHT upper outer quadrant of gluteus (site requested per patient) using aseptic technique and 22 gauge 1.5 inch needle.     Site secured with Band-Aid, needle tip remains intact, patient tolerated well without pain. Patient observed for 15 minutes post injection. Next injection scheduled accordingly. HA Contreras.

## 2020-05-28 ENCOUNTER — PATIENT MESSAGE (OUTPATIENT)
Dept: ENDOCRINOLOGY | Facility: CLINIC | Age: 60
End: 2020-05-28

## 2020-05-28 DIAGNOSIS — E89.0 POSTABLATIVE HYPOTHYROIDISM: Primary | ICD-10-CM

## 2020-05-28 RX ORDER — THYROID 90 MG/1
TABLET ORAL
Qty: 90 TABLET | Refills: 3 | Status: SHIPPED | OUTPATIENT
Start: 2020-05-28 | End: 2021-06-01

## 2020-05-29 ENCOUNTER — PATIENT MESSAGE (OUTPATIENT)
Dept: OBSTETRICS AND GYNECOLOGY | Facility: CLINIC | Age: 60
End: 2020-05-29

## 2020-05-29 DIAGNOSIS — N95.1 MENOPAUSAL SYMPTOM: Primary | ICD-10-CM

## 2020-06-01 RX ORDER — PROGESTERONE 200 MG/1
200 CAPSULE ORAL NIGHTLY
Qty: 30 CAPSULE | Refills: 12 | Status: SHIPPED | OUTPATIENT
Start: 2020-06-01 | End: 2021-06-01

## 2020-06-01 NOTE — TELEPHONE ENCOUNTER
Called patient and discussed, since she had her hysterectomy last year and was taking Prometrium 300 for bleeding issues, she is interested in possibly dropping her dose of progesterone. She does have trouble waking up in the AM.     Will decrease to 200mg Prometrium and will send to standard pharmacy.

## 2020-06-19 ENCOUNTER — TELEPHONE (OUTPATIENT)
Dept: OBSTETRICS AND GYNECOLOGY | Facility: CLINIC | Age: 60
End: 2020-06-19

## 2020-06-23 ENCOUNTER — CLINICAL SUPPORT (OUTPATIENT)
Dept: OBSTETRICS AND GYNECOLOGY | Facility: CLINIC | Age: 60
End: 2020-06-23
Payer: COMMERCIAL

## 2020-06-23 ENCOUNTER — TELEPHONE (OUTPATIENT)
Dept: OBSTETRICS AND GYNECOLOGY | Facility: CLINIC | Age: 60
End: 2020-06-23

## 2020-06-23 DIAGNOSIS — N95.1 MENOPAUSAL SYNDROME (HOT FLUSHES): Primary | ICD-10-CM

## 2020-06-23 PROCEDURE — 99999 PR PBB SHADOW E&M-EST. PATIENT-LVL I: ICD-10-PCS | Mod: PBBFAC,,,

## 2020-06-23 PROCEDURE — 96372 PR INJECTION,THERAP/PROPH/DIAG2ST, IM OR SUBCUT: ICD-10-PCS | Mod: S$GLB,,, | Performed by: OBSTETRICS & GYNECOLOGY

## 2020-06-23 PROCEDURE — 96372 THER/PROPH/DIAG INJ SC/IM: CPT | Mod: S$GLB,,, | Performed by: OBSTETRICS & GYNECOLOGY

## 2020-06-23 PROCEDURE — 99999 PR PBB SHADOW E&M-EST. PATIENT-LVL I: CPT | Mod: PBBFAC,,,

## 2020-06-23 RX ORDER — TESTOSTERONE CYPIONATE 200 MG/ML
76 INJECTION, SOLUTION INTRAMUSCULAR
Status: COMPLETED | OUTPATIENT
Start: 2020-06-23 | End: 2020-11-10

## 2020-06-23 RX ADMIN — TESTOSTERONE CYPIONATE 76 MG: 200 INJECTION, SOLUTION INTRAMUSCULAR at 01:06

## 2020-07-04 ENCOUNTER — HOSPITAL ENCOUNTER (EMERGENCY)
Facility: OTHER | Age: 60
Discharge: HOME | End: 2020-07-04
Attending: EMERGENCY MEDICINE
Payer: COMMERCIAL

## 2020-07-04 VITALS
SYSTOLIC BLOOD PRESSURE: 170 MMHG | HEART RATE: 74 BPM | BODY MASS INDEX: 26.68 KG/M2 | RESPIRATION RATE: 16 BRPM | TEMPERATURE: 98 F | WEIGHT: 145 LBS | HEIGHT: 62 IN | DIASTOLIC BLOOD PRESSURE: 81 MMHG | OXYGEN SATURATION: 97 %

## 2020-07-04 DIAGNOSIS — M79.606 LEG PAIN: ICD-10-CM

## 2020-07-04 DIAGNOSIS — M79.604 RIGHT LEG PAIN: Primary | ICD-10-CM

## 2020-07-04 PROCEDURE — 99284 EMERGENCY DEPT VISIT MOD MDM: CPT | Mod: 25

## 2020-07-04 RX ORDER — IBUPROFEN 600 MG/1
600 TABLET ORAL EVERY 6 HOURS PRN
Qty: 20 TABLET | Refills: 0 | Status: SHIPPED | OUTPATIENT
Start: 2020-07-04 | End: 2021-04-06

## 2020-07-05 NOTE — ED NOTES
Ultrasound at bedside. Patient rounding complete. Pt reports pain 4/10. Restroom and comfort needs addressed. Pt updated on POC. Call light in reach. Will continue to monitor.

## 2020-07-05 NOTE — ED TRIAGE NOTES
"Pt reports to ED with c/o RLE "dull" pain worsening today. Pt reports falling off a stool 5 weeks ago. Pt reports pain was around her ankle and is now radiating up the side of her leg and behind her R knee. Pt reports "normal" swelling to BLE. Pt states she follows a cardiologist and occasionally takes "fluid pills." Pt is concerned for a blood clot. Pt states the pain is worse when she lies in bed at night.   "

## 2020-07-05 NOTE — ED PROVIDER NOTES
Encounter Date: 2020    SCRIBE #1 NOTE: I, Saeid Quijano, am scribing for, and in the presence of, Dr. Rasmussen.       History     Chief Complaint   Patient presents with    Leg Pain     R leg pain starting in ankle, and radiating to behind knee reports recent fall.      Time seen by provider: 9:25 PM    This is a 59 y.o. female who presents with complaint of right leg pain that began approximately five weeks ago. The patient was cleaning her closet and fell off of a stool. She states that she bruised herself and shook it off. Since the fall she has been experiencing pain to the right ankle that radiates up the leg. Today, the pain began to radiate from the ankle, to the back of the knee, and up the thigh. She was concerned about having a blood clot, due to her the medications that she takes. The patient reports that she attend piliates twice a week.  She denies fever, sore throat, chest pain, shortness of breath, cough, nausea, vomiting, dysuria, and back pain.     The history is provided by the patient.     Review of patient's allergies indicates:   Allergen Reactions    Penicillins     Azithromycin (bulk) Rash     z-pack     Past Medical History:   Diagnosis Date    Cataract     Fibromyalgia     Hx of colonic polyps     Hyperlipidemia     Hypertension     Hyperthyroidism     Hypothyroidism     Irregular heart beat     Ophthalmic Graves disease      Past Surgical History:   Procedure Laterality Date     SECTION, CLASSIC      COLONOSCOPY N/A 2016    Procedure: COLONOSCOPY;  Surgeon: Benny Menchaca MD;  Location: [address]);  Service: Endoscopy;  Laterality: N/A;  requests Dr Menchaca    CYSTOSCOPY N/A 12/10/2018    Procedure: CYSTOSCOPY;  Surgeon: Pinky Ko DO;  Location: [facility] OR;  Service: OB/GYN;  Laterality: N/A;    DILATION OF URETHRA N/A 12/10/2018    Procedure: DILATION, URETHRA;  Surgeon: Pinky Ko DO;  Location: [facility];  Service: OB/GYN;   Laterality: N/A;    ENTEROCELE REPAIR N/A 12/10/2018    Procedure: REPAIR, ENTEROCELE;  Surgeon: Pinky Ko DO;  Location: [facility] OR;  Service: OB/GYN;  Laterality: N/A;    EYE SURGERY      LAPAROSCOPIC SALPINGO-OOPHORECTOMY Bilateral 12/10/2018    Procedure: SALPINGO-OOPHORECTOMY, LAPAROSCOPIC;  Surgeon: Pinky Ko DO;  Location: [facility] OR;  Service: OB/GYN;  Laterality: Bilateral;    LAPAROSCOPIC TOTAL HYSTERECTOMY N/A 12/10/2018    Procedure: HYSTERECTOMY, TOTAL, LAPAROSCOPIC; excision of umbilical mole;  Surgeon: Pinky Ko DO;  Location: [facility] OR;  Service: OB/GYN;  Laterality: N/A;    lasik ou      POSTERIOR REPAIR N/A 12/10/2018    Procedure: COLPORRHAPHY, POSTERIOR;  Surgeon: Pinky Ko DO;  Location: [facility] OR;  Service: OB/GYN;  Laterality: N/A;    thyroid radiation      TUBAL LIGATION       Family History   Problem Relation Age of Onset    Heart failure Mother     Thyroid disease Mother     Stroke Mother     Parkinsonism Father     Hypertension Maternal Grandmother     Stroke Maternal Grandmother     Diabetes Maternal Grandfather     Stroke Paternal Grandfather     Amblyopia Neg Hx     Blindness Neg Hx     Cancer Neg Hx     Cataracts Neg Hx     Glaucoma Neg Hx     Macular degeneration Neg Hx     Retinal detachment Neg Hx     Strabismus Neg Hx     Breast cancer Neg Hx     Colon cancer Neg Hx     Ovarian cancer Neg Hx      Social History     Tobacco Use    Smoking status: Current Every Day Smoker     Types: Vaping with nicotine     Last attempt to quit: 2011     Years since quittin.0    Smokeless tobacco: Never Used   Substance Use Topics    Alcohol use: Yes     Alcohol/week: 0.0 standard drinks     Comment: SOCIALLY    Drug use: No     Review of Systems   Constitutional: Negative for chills and fever.   HENT: Negative for congestion.    Eyes: Negative for discharge.   Respiratory: Negative for cough and shortness of breath.     Cardiovascular: Negative for chest pain.   Gastrointestinal: Negative for nausea.   Genitourinary: Negative for dysuria.   Musculoskeletal: Negative for back pain.   Neurological: Negative for dizziness.   Hematological: Does not bruise/bleed easily.   All other systems reviewed and are negative.      Physical Exam     Initial Vitals   BP Pulse Resp Temp SpO2   07/04/20 2113 07/04/20 2113 07/04/20 2113 07/04/20 2115 07/04/20 2113   (!) 180/104 94 16 98.1 °F (36.7 °C) 97 %      MAP       --                Physical Exam    Nursing note and vitals reviewed.  Constitutional: She appears well-developed and well-nourished.  Non-toxic appearance. She does not have a sickly appearance. No distress.   HENT:   Head: Normocephalic and atraumatic.   Nose: Nose normal.   Mouth/Throat: Oropharynx is clear and moist.   Eyes: Conjunctivae, EOM and lids are normal. Pupils are equal, round, and reactive to light. Right eye exhibits no nystagmus. Left eye exhibits no nystagmus.   Neck: Trachea normal, normal range of motion and phonation normal. Neck supple. No stridor present.   Cardiovascular: Normal rate, regular rhythm, normal heart sounds and normal pulses. Exam reveals no gallop and no friction rub.    No murmur heard.  Pulmonary/Chest: Breath sounds normal. No respiratory distress. She has no wheezes. She has no rhonchi. She has no rales.   Abdominal: Soft. Normal appearance and bowel sounds are normal. She exhibits no distension. There is no abdominal tenderness. There is no rigidity, no rebound, no guarding, no tenderness at McBurney's point and negative Cabello's sign.   Musculoskeletal: Tenderness present. No edema.      Comments: Tenderness to the medial aspect of the right leg and the proximal medial malleolus. The is a small area of ecchymosis. No palpable chords. No erythema or fluctuance. No bony tenderness to palpation of tibia or fibula.    Neurological: She is alert and oriented to person, place, and time. She has  normal strength and normal reflexes. She displays normal reflexes. No cranial nerve deficit or sensory deficit. She displays a negative Romberg sign. GCS eye subscore is 4. GCS verbal subscore is 5. GCS motor subscore is 6.   Skin: Skin is warm, dry and intact. Capillary refill takes less than 2 seconds. No rash noted. No pallor.   Psychiatric: Her speech is normal and behavior is normal.         ED Course   Procedures  Labs Reviewed - No data to display       Imaging Results          US Lower Extremity Veins Right (Final result)  Result time 07/04/20 23:01:40    Final result by Richy Walker MD (07/04/20 23:01:40)                 Impression:      No evidence of right lower extremity deep venous thrombosis.      Electronically signed by: Richy Walker MD  Date:    07/04/2020  Time:    23:01             Narrative:    EXAMINATION:  US LOWER EXTREMITY VEINS RIGHT    CLINICAL HISTORY:  Pain in leg, unspecified    TECHNIQUE:  Duplex and color flow Doppler evaluation of the right lower extremity veins was performed.    COMPARISON:  None    FINDINGS:  No evidence of clot involving the bilateral common femoral veins or right greater saphenous, femoral, popliteal, peroneal, anterior and posterior tibial veins.  All venous structures demonstrate normal respiratory phasicity and augment adequately.  No evidence of soft tissue mass or Baker's cyst.                                 Medical Decision Making:   History:   Old Medical Records: I decided to obtain old medical records.  Clinical Tests:   Radiological Study: Ordered and Reviewed            Scribe Attestation:   Scribe #1: I performed the above scribed service and the documentation accurately describes the services I performed. I attest to the accuracy of the note.    Attending Attestation:           Physician Attestation for Scribe:  Physician Attestation Statement for Scribe #1: I, Dr. Rasmussen, reviewed documentation, as scribed by Saeid Quijano in my  presence, and it is both accurate and complete.                 ED Course as of Jul 04 2314   Sat Jul 04, 2020   2156 59-year-old female with right leg pain.  Had a fall about 5 weeks ago has been ambulating but the pain has slowly been moving up her right leg.  No signs of erythema to suggest cellulitis.  Could be musculoskeletal.  History does not necessarily sound like a neurological disorder or nerve pain.  Will get an ultrasound to rule out DVT.  On my exam no signs of fracture and no bony tenderness.  Do not feel x-rays are indicated at this time.    [SM]   2314 UltrasoundShows no evidence of DVT.  Will discharge the patient to follow up with primary care as an outpatient.    Patient discharged home in stable condition. Diagnosis and treatment plan explained to patient and/or family member who is at bedside. I have answered all questions and the patient is satisfied with the plan of care. Strict return precautions given. The patient demonstrates understanding of the care plan. This is the extent to the patients complaints today here in the emergency department.    [SM]      ED Course User Index  [SM] Rafal Rasmussen DO                Clinical Impression:     1. Right leg pain    2. Leg pain              ED Disposition Condition    Discharge Stable        ED Prescriptions     Medication Sig Dispense Start Date End Date Auth. Provider    ibuprofen (ADVIL,MOTRIN) 600 MG tablet Take 1 tablet (600 mg total) by mouth every 6 (six) hours as needed for Pain. 20 tablet 7/4/2020  Rafal Rasmussen DO        Follow-up Information     Follow up With Specialties Details Why Contact Info    Hira Jara MD Nephrology Schedule an appointment as soon as possible for a visit   [address] [place] [id number]  [phone]                                       Rafal Rasmussen DO  07/04/20 2835

## 2020-07-16 ENCOUNTER — OFFICE VISIT (OUTPATIENT)
Dept: URGENT CARE | Facility: CLINIC | Age: 60
End: 2020-07-16
Payer: COMMERCIAL

## 2020-07-16 VITALS
DIASTOLIC BLOOD PRESSURE: 81 MMHG | TEMPERATURE: 98 F | HEIGHT: 62 IN | WEIGHT: 145.06 LBS | HEART RATE: 92 BPM | OXYGEN SATURATION: 96 % | RESPIRATION RATE: 20 BRPM | BODY MASS INDEX: 26.69 KG/M2 | SYSTOLIC BLOOD PRESSURE: 142 MMHG

## 2020-07-16 DIAGNOSIS — J02.9 SORE THROAT: ICD-10-CM

## 2020-07-16 DIAGNOSIS — Z20.822 EXPOSURE TO COVID-19 VIRUS: Primary | ICD-10-CM

## 2020-07-16 DIAGNOSIS — R51.9 NONINTRACTABLE HEADACHE, UNSPECIFIED CHRONICITY PATTERN, UNSPECIFIED HEADACHE TYPE: ICD-10-CM

## 2020-07-16 PROCEDURE — U0003 INFECTIOUS AGENT DETECTION BY NUCLEIC ACID (DNA OR RNA); SEVERE ACUTE RESPIRATORY SYNDROME CORONAVIRUS 2 (SARS-COV-2) (CORONAVIRUS DISEASE [COVID-19]), AMPLIFIED PROBE TECHNIQUE, MAKING USE OF HIGH THROUGHPUT TECHNOLOGIES AS DESCRIBED BY CMS-2020-01-R: HCPCS

## 2020-07-16 PROCEDURE — 99213 PR OFFICE/OUTPT VISIT, EST, LEVL III, 20-29 MIN: ICD-10-PCS | Mod: S$GLB,,, | Performed by: NURSE PRACTITIONER

## 2020-07-16 PROCEDURE — 99213 OFFICE O/P EST LOW 20 MIN: CPT | Mod: S$GLB,,, | Performed by: NURSE PRACTITIONER

## 2020-07-16 NOTE — PROGRESS NOTES
"Subjective:       Patient ID: Alma Jackson is a 59 y.o. female.    Vitals:  height is 5' 2" (1.575 m) and weight is 65.8 kg (145 lb 1 oz). Her temperature is 97.8 °F (36.6 °C). Her blood pressure is 142/81 (abnormal) and her pulse is 92. Her respiration is 20 and oxygen saturation is 96%.     Chief Complaint: COVID-19 Concerns    Sore throat this morning      Constitution: Negative for chills, fatigue and fever.   HENT: Negative for congestion and sore throat.    Neck: Negative for painful lymph nodes.   Cardiovascular: Negative for chest pain and leg swelling.   Eyes: Negative for double vision and blurred vision.   Respiratory: Negative for cough and shortness of breath.    Gastrointestinal: Negative for nausea, vomiting and diarrhea.   Genitourinary: Negative for dysuria, frequency, urgency and history of kidney stones.   Musculoskeletal: Negative for joint pain, joint swelling, muscle cramps and muscle ache.   Skin: Negative for color change, pale, rash and bruising.   Allergic/Immunologic: Negative for seasonal allergies.   Neurological: Negative for dizziness, history of vertigo, light-headedness, passing out and headaches.   Hematologic/Lymphatic: Negative for swollen lymph nodes.   Psychiatric/Behavioral: Negative for nervous/anxious, sleep disturbance and depression. The patient is not nervous/anxious.        Objective:      Physical Exam   Constitutional: She is oriented to person, place, and time. She appears well-developed. She is cooperative.  Non-toxic appearance. She does not appear ill. No distress.   HENT:   Head: Normocephalic and atraumatic.   Ears:   Right Ear: Hearing, tympanic membrane, external ear and ear canal normal.   Left Ear: Hearing, tympanic membrane, external ear and ear canal normal.   Nose: Nose normal. No mucosal edema, rhinorrhea or nasal deformity. No epistaxis. Right sinus exhibits no maxillary sinus tenderness and no frontal sinus tenderness. Left sinus exhibits no " maxillary sinus tenderness and no frontal sinus tenderness.   Mouth/Throat: Uvula is midline, oropharynx is clear and moist and mucous membranes are normal. No trismus in the jaw. Normal dentition. No uvula swelling. Cobblestoning present. No posterior oropharyngeal erythema.   PND      Comments: PND  Eyes: Conjunctivae and lids are normal. Right eye exhibits no discharge. Left eye exhibits no discharge. No scleral icterus.   Neck: Trachea normal, normal range of motion, full passive range of motion without pain and phonation normal. Neck supple.   Cardiovascular: Normal rate, regular rhythm, normal heart sounds and normal pulses.   Pulmonary/Chest: Effort normal and breath sounds normal. No respiratory distress.   Abdominal: Soft. Normal appearance and bowel sounds are normal. She exhibits no distension, no pulsatile midline mass and no mass. There is no abdominal tenderness.   Musculoskeletal: Normal range of motion.         General: No deformity.   Neurological: She is alert and oriented to person, place, and time. She exhibits normal muscle tone. Coordination normal.   Skin: Skin is warm, dry, intact, not diaphoretic and not pale. Psychiatric: Her speech is normal and behavior is normal. Judgment and thought content normal.   Nursing note and vitals reviewed.        Assessment:       1. Exposure to Covid-19 Virus    2. Nonintractable headache, unspecified chronicity pattern, unspecified headache type    3. Sore throat        Plan:         Exposure to Covid-19 Virus    Nonintractable headache, unspecified chronicity pattern, unspecified headache type  -     COVID-19 Routine Screening    Sore throat      Patient Instructions   Use warm salt water gargles to ease your throat pain. Warm salt water gargles as needed for sore throat-  1/2 tsp salt to 1 cup warm water, gargle as desired. Hot tea tea with honey helps soothe.     Take tylenol or ibuprofen for minor pain.     Increase fluid intake.     Remain quarantined  until test is resulted. If symptoms persist or worsen follow up immediately with PCP or go to closest emergency room.         Instructions for Patients with Confirmed or Suspected COVID-19    If you are awaiting your test result, you will either be called or it will be released to the patient portal.  If you have any questions about your test, please visit [email or web address] or call our COVID-19 information line at [phone].      Preventing the Spread of Coronavirus Disease 2019 (COVID-19) in Homes and Residential Communities -- Patients     Prevention steps for people with confirmed or suspected COVID-19 (including persons under investigation) who do not need to be hospitalized and people with confirmed COVID-19 who were hospitalized and determined to be medically stable to go home.      Stay home except to get medical care.    Separate yourself from other people and animals in your home.    Call ahead before visiting your doctor.    Wear a face mask.    Cover your coughs and sneezes.    Clean your hands often.    Avoid sharing personal household items.    Clean all high-touch surfaces every day.    Monitor your symptoms. Seek prompt medical attention if your illness is worsening (e.g., difficulty breathing). Before seeking care, call your healthcare provider.    If you have a medical emergency and must call 911, notify the dispatcher that you have or are being evaluated for COVID-19. If possible, put on a face mask before emergency medical services arrive.    Use the following symptom-based strategy to return to normal activity following a suspected or confirmed case of COVID-19. Continue isolation until:   o At least 3 days (72 hours) have passed since recovery defined as resolution of fever without the use of fever-reducing medications and improvement in respiratory symptoms (e.g. cough, shortness of breath), and   o At least 10 days have passed since symptoms first appeared.      Precautions for household members, intimate partners and caregivers in a non-healthcare setting of a patient with symptomatic laboratory-confirmed COVID-19 or a patient under investigation.     Household members, intimate partners and caregivers in a non-healthcare setting may have close contact with a person with symptomatic, laboratory-confirmed COVID-19 or a person under investigation. Close contacts should monitor their health; they should call their healthcare provider right away if they develop symptoms suggestive of COVID-19 (e.g., fever, cough, shortness of breath). Close contacts should also follow these recommendations:     · Stay home for the duration of the time recommended by healthcare provider, except to get medical care. Separate yourself from other people and animals in the home.  · Monitor the patients symptoms. If the patient is getting sicker, call his or her healthcare provider. If the patient has a medical emergency and you need to call 911, notify the dispatch personnel that the patient has or is being evaluated for COVID-19.   · Wear a facemask when around other people such as sharing a room or vehicle and before entering a healthcare provider's office.  · Cover coughs and sneezes with a tissue. Throw used tissues in a lined trash can immediately and wash hands.  · Clean hands often with soap and water for at least 20 seconds or with an alcohol-based hand , rubbing hands together until they feel dry. Avoid touching your eyes, nose, and mouth with unwashed hands.  · Clean all high-touch; surfaces every day, including counters, tabletops, doorknobs, bathroom fixtures, toilets, phones, keyboards, tablets, bedside tables, etc. Use a household cleaning spray or wipe according to label instructions.  · Avoid sharing personal household items such as dishes, drinking glasses, cups, towels, bedding, etc. After these items are used, they should be washed thoroughly with soap and  water.  · Use the following symptom-based strategy to return to normal activity following a suspected or confirmed case of COVID-19. Continue isolation until:   · At least 3 days (72 hours) have passed since recovery defined as resolution of fever without the use of fever-reducing medications and improvement in respiratory symptoms (e.g. cough, shortness of breath), and   · At least 10 days have passed since symptoms first appeared.

## 2020-07-16 NOTE — PATIENT INSTRUCTIONS
Use warm salt water gargles to ease your throat pain. Warm salt water gargles as needed for sore throat-  1/2 tsp salt to 1 cup warm water, gargle as desired. Hot tea tea with honey helps soothe.     Take tylenol or ibuprofen for minor pain.     Increase fluid intake.     Remain quarantined until test is resulted. If symptoms persist or worsen follow up immediately with PCP or go to closest emergency room.         Instructions for Patients with Confirmed or Suspected COVID-19    If you are awaiting your test result, you will either be called or it will be released to the patient portal.  If you have any questions about your test, please visit [email or web address] or call our COVID-19 information line at [phone].      Preventing the Spread of Coronavirus Disease 2019 (COVID-19) in Homes and Residential Communities -- Patients     Prevention steps for people with confirmed or suspected COVID-19 (including persons under investigation) who do not need to be hospitalized and people with confirmed COVID-19 who were hospitalized and determined to be medically stable to go home.      Stay home except to get medical care.    Separate yourself from other people and animals in your home.    Call ahead before visiting your doctor.    Wear a face mask.    Cover your coughs and sneezes.    Clean your hands often.    Avoid sharing personal household items.    Clean all high-touch surfaces every day.    Monitor your symptoms. Seek prompt medical attention if your illness is worsening (e.g., difficulty breathing). Before seeking care, call your healthcare provider.    If you have a medical emergency and must call 911, notify the dispatcher that you have or are being evaluated for COVID-19. If possible, put on a face mask before emergency medical services arrive.    Use the following symptom-based strategy to return to normal activity following a suspected or confirmed case of COVID-19. Continue isolation  until:   o At least 3 days (72 hours) have passed since recovery defined as resolution of fever without the use of fever-reducing medications and improvement in respiratory symptoms (e.g. cough, shortness of breath), and   o At least 10 days have passed since symptoms first appeared.     Precautions for household members, intimate partners and caregivers in a non-healthcare setting of a patient with symptomatic laboratory-confirmed COVID-19 or a patient under investigation.     Household members, intimate partners and caregivers in a non-healthcare setting may have close contact with a person with symptomatic, laboratory-confirmed COVID-19 or a person under investigation. Close contacts should monitor their health; they should call their healthcare provider right away if they develop symptoms suggestive of COVID-19 (e.g., fever, cough, shortness of breath). Close contacts should also follow these recommendations:     · Stay home for the duration of the time recommended by healthcare provider, except to get medical care. Separate yourself from other people and animals in the home.  · Monitor the patients symptoms. If the patient is getting sicker, call his or her healthcare provider. If the patient has a medical emergency and you need to call 911, notify the dispatch personnel that the patient has or is being evaluated for COVID-19.   · Wear a facemask when around other people such as sharing a room or vehicle and before entering a healthcare provider's office.  · Cover coughs and sneezes with a tissue. Throw used tissues in a lined trash can immediately and wash hands.  · Clean hands often with soap and water for at least 20 seconds or with an alcohol-based hand , rubbing hands together until they feel dry. Avoid touching your eyes, nose, and mouth with unwashed hands.  · Clean all high-touch; surfaces every day, including counters, tabletops, doorknobs, bathroom fixtures, toilets, phones, keyboards,  tablets, bedside tables, etc. Use a household cleaning spray or wipe according to label instructions.  · Avoid sharing personal household items such as dishes, drinking glasses, cups, towels, bedding, etc. After these items are used, they should be washed thoroughly with soap and water.  · Use the following symptom-based strategy to return to normal activity following a suspected or confirmed case of COVID-19. Continue isolation until:   · At least 3 days (72 hours) have passed since recovery defined as resolution of fever without the use of fever-reducing medications and improvement in respiratory symptoms (e.g. cough, shortness of breath), and   · At least 10 days have passed since symptoms first appeared.

## 2020-07-17 LAB — SARS-COV-2 RNA RESP QL NAA+PROBE: NOT DETECTED

## 2020-07-21 ENCOUNTER — CLINICAL SUPPORT (OUTPATIENT)
Dept: OBSTETRICS AND GYNECOLOGY | Facility: CLINIC | Age: 60
End: 2020-07-21
Payer: COMMERCIAL

## 2020-07-21 DIAGNOSIS — N95.1 MENOPAUSAL SYMPTOM: Primary | ICD-10-CM

## 2020-07-21 PROCEDURE — 96372 THER/PROPH/DIAG INJ SC/IM: CPT | Mod: S$GLB,,, | Performed by: OBSTETRICS & GYNECOLOGY

## 2020-07-21 PROCEDURE — 96372 PR INJECTION,THERAP/PROPH/DIAG2ST, IM OR SUBCUT: ICD-10-PCS | Mod: S$GLB,,, | Performed by: OBSTETRICS & GYNECOLOGY

## 2020-07-21 RX ADMIN — TESTOSTERONE CYPIONATE 76 MG: 200 INJECTION, SOLUTION INTRAMUSCULAR at 01:07

## 2020-07-21 NOTE — PROGRESS NOTES
Patient returns for her monthly hormone injection. She is doing well on her current regimen. 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 No side effects.     Depo-Estradiol 5mg and Testosterone 76mg administered IM to RIGHT upper outer quadrant of gluteus (site requested per patient) using aseptic technique and 22 gauge 1.5 inch needle.     Site secured with Band-Aid, needle tip remains intact, patient tolerated well without pain. Patient observed for 15 minutes post injection. Next injection scheduled accordingly. HA Contreras.

## 2020-08-18 ENCOUNTER — CLINICAL SUPPORT (OUTPATIENT)
Dept: OBSTETRICS AND GYNECOLOGY | Facility: CLINIC | Age: 60
End: 2020-08-18
Payer: COMMERCIAL

## 2020-08-18 DIAGNOSIS — N95.1 MENOPAUSAL SYMPTOM: Primary | ICD-10-CM

## 2020-08-18 PROCEDURE — 96372 THER/PROPH/DIAG INJ SC/IM: CPT | Mod: S$GLB,,, | Performed by: OBSTETRICS & GYNECOLOGY

## 2020-08-18 PROCEDURE — 99999 PR PBB SHADOW E&M-EST. PATIENT-LVL I: ICD-10-PCS | Mod: PBBFAC,,,

## 2020-08-18 PROCEDURE — 96372 PR INJECTION,THERAP/PROPH/DIAG2ST, IM OR SUBCUT: ICD-10-PCS | Mod: S$GLB,,, | Performed by: OBSTETRICS & GYNECOLOGY

## 2020-08-18 PROCEDURE — 99999 PR PBB SHADOW E&M-EST. PATIENT-LVL I: CPT | Mod: PBBFAC,,,

## 2020-08-18 RX ADMIN — TESTOSTERONE CYPIONATE 76 MG: 200 INJECTION, SOLUTION INTRAMUSCULAR at 01:08

## 2020-09-15 ENCOUNTER — CLINICAL SUPPORT (OUTPATIENT)
Dept: OBSTETRICS AND GYNECOLOGY | Facility: CLINIC | Age: 60
End: 2020-09-15
Payer: COMMERCIAL

## 2020-09-15 DIAGNOSIS — N95.1 MENOPAUSAL SYMPTOM: Primary | ICD-10-CM

## 2020-09-15 PROCEDURE — 99999 PR PBB SHADOW E&M-EST. PATIENT-LVL I: ICD-10-PCS | Mod: PBBFAC,,,

## 2020-09-15 PROCEDURE — 96372 PR INJECTION,THERAP/PROPH/DIAG2ST, IM OR SUBCUT: ICD-10-PCS | Mod: S$GLB,,, | Performed by: OBSTETRICS & GYNECOLOGY

## 2020-09-15 PROCEDURE — 96372 THER/PROPH/DIAG INJ SC/IM: CPT | Mod: S$GLB,,, | Performed by: OBSTETRICS & GYNECOLOGY

## 2020-09-15 PROCEDURE — 99999 PR PBB SHADOW E&M-EST. PATIENT-LVL I: CPT | Mod: PBBFAC,,,

## 2020-09-15 RX ADMIN — TESTOSTERONE CYPIONATE 76 MG: 200 INJECTION, SOLUTION INTRAMUSCULAR at 01:09

## 2020-09-25 ENCOUNTER — OFFICE VISIT (OUTPATIENT)
Dept: OBSTETRICS AND GYNECOLOGY | Facility: CLINIC | Age: 60
End: 2020-09-25
Payer: COMMERCIAL

## 2020-09-25 VITALS
DIASTOLIC BLOOD PRESSURE: 74 MMHG | BODY MASS INDEX: 27.63 KG/M2 | SYSTOLIC BLOOD PRESSURE: 148 MMHG | WEIGHT: 150.13 LBS | HEIGHT: 62 IN

## 2020-09-25 DIAGNOSIS — F41.9 ANXIETY: ICD-10-CM

## 2020-09-25 DIAGNOSIS — N39.0 URINARY TRACT INFECTION WITHOUT HEMATURIA, SITE UNSPECIFIED: Primary | ICD-10-CM

## 2020-09-25 LAB
BACTERIA #/AREA URNS AUTO: ABNORMAL /HPF
BILIRUB UR QL STRIP: NEGATIVE
CLARITY UR REFRACT.AUTO: ABNORMAL
COLOR UR AUTO: ABNORMAL
GLUCOSE UR QL STRIP: NEGATIVE
HGB UR QL STRIP: ABNORMAL
HYALINE CASTS UR QL AUTO: 0 /LPF
KETONES UR QL STRIP: NEGATIVE
LEUKOCYTE ESTERASE UR QL STRIP: ABNORMAL
MICROSCOPIC COMMENT: ABNORMAL
NITRITE UR QL STRIP: NEGATIVE
PH UR STRIP: 5 [PH] (ref 5–8)
PROT UR QL STRIP: ABNORMAL
RBC #/AREA URNS AUTO: 4 /HPF (ref 0–4)
SP GR UR STRIP: 1.03 (ref 1–1.03)
SQUAMOUS #/AREA URNS AUTO: 12 /HPF
URN SPEC COLLECT METH UR: ABNORMAL
WBC #/AREA URNS AUTO: 44 /HPF (ref 0–5)

## 2020-09-25 PROCEDURE — 87186 SC STD MICRODIL/AGAR DIL: CPT

## 2020-09-25 PROCEDURE — 87077 CULTURE AEROBIC IDENTIFY: CPT

## 2020-09-25 PROCEDURE — 99213 PR OFFICE/OUTPT VISIT, EST, LEVL III, 20-29 MIN: ICD-10-PCS | Mod: S$GLB,,, | Performed by: PHYSICIAN ASSISTANT

## 2020-09-25 PROCEDURE — 99213 OFFICE O/P EST LOW 20 MIN: CPT | Mod: S$GLB,,, | Performed by: PHYSICIAN ASSISTANT

## 2020-09-25 PROCEDURE — 87086 URINE CULTURE/COLONY COUNT: CPT

## 2020-09-25 PROCEDURE — 81001 URINALYSIS AUTO W/SCOPE: CPT

## 2020-09-25 PROCEDURE — 87088 URINE BACTERIA CULTURE: CPT

## 2020-09-25 RX ORDER — DOXYCYCLINE 100 MG/1
CAPSULE ORAL
COMMUNITY
Start: 2020-09-11

## 2020-09-25 RX ORDER — SPIRONOLACTONE 25 MG/1
TABLET ORAL
COMMUNITY
Start: 2020-09-25

## 2020-09-25 RX ORDER — CLONAZEPAM 0.5 MG/1
0.5 TABLET ORAL NIGHTLY
Qty: 30 TABLET | Refills: 0 | Status: SHIPPED | OUTPATIENT
Start: 2020-09-25 | End: 2020-10-25

## 2020-09-25 RX ORDER — NITROFURANTOIN 25; 75 MG/1; MG/1
100 CAPSULE ORAL 2 TIMES DAILY
Qty: 14 CAPSULE | Refills: 0 | Status: SHIPPED | OUTPATIENT
Start: 2020-09-25 | End: 2020-10-02

## 2020-09-25 NOTE — PROGRESS NOTES
Subjective:      Alma Jackson is a 59 y.o. female who presents for possible UTI. Burning with urination and dysuria since yesterday. Certain she has a UTI. Took Azo last night and today which has helped. Denies fever or chills. Sexually active 6x last week. Has not had UTI in a few years. Denies fever or chills. Currently taking doxy BID from dermatologist for scalp condition.    Feeling very anxious. Use to get klonopin to take nightly as needed for anxiety and requesting refill. Sees PCP next month but could not be seen sooner.    Office Visit on 2020   Component Date Value Ref Range Status    SARS-CoV2 (COVID-19) Qualitative P* 2020 Not Detected  Not Detected Final       Past Medical History:   Diagnosis Date    Cataract     Fibromyalgia     Hx of colonic polyps     Hyperlipidemia     Hypertension     Hyperthyroidism     Hypothyroidism     Irregular heart beat     Ophthalmic Graves disease      Past Surgical History:   Procedure Laterality Date     SECTION, CLASSIC      COLONOSCOPY N/A 2016    Procedure: COLONOSCOPY;  Surgeon: Benny Menchaca MD;  Location: [address];  Service: Endoscopy;  Laterality: N/A;  requests Dr Menchaca    CYSTOSCOPY N/A 12/10/2018    Procedure: CYSTOSCOPY;  Surgeon: Pinky Ko DO;  Location: [facility];  Service: OB/GYN;  Laterality: N/A;    DILATION OF URETHRA N/A 12/10/2018    Procedure: DILATION, URETHRA;  Surgeon: Pinky Ko DO;  Location: [facility];  Service: OB/GYN;  Laterality: N/A;    ENTEROCELE REPAIR N/A 12/10/2018    Procedure: REPAIR, ENTEROCELE;  Surgeon: Pinky Ko DO;  Location: [facility];  Service: OB/GYN;  Laterality: N/A;    EYE SURGERY      LAPAROSCOPIC SALPINGO-OOPHORECTOMY Bilateral 12/10/2018    Procedure: SALPINGO-OOPHORECTOMY, LAPAROSCOPIC;  Surgeon: Pinky Ko DO;  Location: [facility];  Service: OB/GYN;  Laterality: Bilateral;    LAPAROSCOPIC TOTAL HYSTERECTOMY  N/A 12/10/2018    Procedure: HYSTERECTOMY, TOTAL, LAPAROSCOPIC; excision of umbilical mole;  Surgeon: Pinky Ko DO;  Location: [facility], [place], operated by [facility];  Service: OB/GYN;  Laterality: N/A;    lasik ou      POSTERIOR REPAIR N/A 12/10/2018    Procedure: COLPORRHAPHY, POSTERIOR;  Surgeon: Pinky Ko DO;  Location: [facility], [place], operated by [facility];  Service: OB/GYN;  Laterality: N/A;    thyroid radiation      TUBAL LIGATION       Social History     Tobacco Use    Smoking status: Current Every Day Smoker     Types: Vaping with nicotine     Last attempt to quit: 2011     Years since quittin.2    Smokeless tobacco: Never Used   Substance Use Topics    Alcohol use: Yes     Alcohol/week: 0.0 standard drinks     Comment: SOCIALLY    Drug use: No     Family History   Problem Relation Age of Onset    Heart failure Mother     Thyroid disease Mother     Stroke Mother     Parkinsonism Father     Hypertension Maternal Grandmother     Stroke Maternal Grandmother     Diabetes Maternal Grandfather     Stroke Paternal Grandfather     Amblyopia Neg Hx     Blindness Neg Hx     Cancer Neg Hx     Cataracts Neg Hx     Glaucoma Neg Hx     Macular degeneration Neg Hx     Retinal detachment Neg Hx     Strabismus Neg Hx     Breast cancer Neg Hx     Colon cancer Neg Hx     Ovarian cancer Neg Hx      OB History    Para Term  AB Living   3 3 3         SAB TAB Ectopic Multiple Live Births                  # Outcome Date GA Lbr Arvind/2nd Weight Sex Delivery Anes PTL Lv   3 Term            2 Term            1 Term                Current Outpatient Medications:     doxycycline (MONODOX) 100 MG capsule, TK 1 TO 2 CS PO QD WF AND A TALL GLASS OF WATER, Disp: , Rfl:     finasteride (PROSCAR) 5 mg tablet, Take 5 mg by mouth once daily., Disp: , Rfl:     hydrochlorothiazide (HYDRODIURIL) 25 MG tablet, Take 1 tablet (25 mg total) by mouth once daily., Disp: 90 tablet, Rfl: 3    progesterone (PROMETRIUM) 200 MG capsule, Take  1 capsule (200 mg total) by mouth nightly., Disp: 30 capsule, Rfl: 12    spironolactone (ALDACTONE) 25 MG tablet, , Disp: , Rfl:     thyroid, pork, (ARMOUR THYROID) 90 mg Tab, TAKE 1 TABLET(90 MG) BY MOUTH DAILY BEFORE BREAKFAST 6 days a week, Disp: 90 tablet, Rfl: 3    amitriptyline (ELAVIL) 10 MG tablet, TAKE 1 TABLET(10 MG) BY MOUTH EVERY EVENING (Patient not taking: Reported on 5/27/2020), Disp: 90 tablet, Rfl: 0    baclofen (LIORESAL) 10 MG tablet, Take 10 mg by mouth as needed., Disp: , Rfl: 1    cholecalciferol, vitamin D3, (VITAMIN D3) 1,000 unit capsule, Take 2 capsules (2,000 Units total) by mouth once daily. (Patient not taking: Reported on 9/25/2020), Disp: 120 capsule, Rfl: 3    clonazePAM (KLONOPIN) 0.5 MG tablet, Take 1 tablet (0.5 mg total) by mouth every evening., Disp: 30 tablet, Rfl: 0    cyclobenzaprine (FLEXERIL) 10 MG tablet, TK 1 T PO QHS, Disp: , Rfl: 1    diltiaZEM (CARDIZEM) 30 MG tablet, every 12 (twelve) hours. , Disp: , Rfl: 3    diphth,pertus,acell,,tetanus (BOOSTRIX) 2.5-8-5 Lf-mcg-Lf/0.5mL Syrg injection, Inject into the muscle. (Patient not taking: Reported on 9/25/2020), Disp: 0.5 mL, Rfl: 0    doxycycline (VIBRA-TABS) 100 MG tablet, Take 100 mg by mouth 2 (two) times daily., Disp: , Rfl:     estrogens,conjugated,-methyltestosterone 0.625-1.25mg (ESTRATEST HS) 0.625-1.25 mg per tablet, Take 1 tablet by mouth once daily. (Patient not taking: Reported on 9/25/2020), Disp: 30 tablet, Rfl: 5    fluocinolone acetonide oil 0.01 % Drop, GAL AA BID, Disp: , Rfl: 2    ibuprofen (ADVIL,MOTRIN) 600 MG tablet, Take 1 tablet (600 mg total) by mouth every 6 (six) hours as needed for Pain., Disp: 20 tablet, Rfl: 0    ibuprofen (ADVIL,MOTRIN) 800 MG tablet, Take 1 tablet (800 mg total) by mouth every 8 (eight) hours as needed for Pain., Disp: 30 tablet, Rfl: 1    nitrofurantoin, macrocrystal-monohydrate, (MACROBID) 100 MG capsule, Take 1 capsule (100 mg total) by mouth 2 (two) times  "daily. for 7 days, Disp: 14 capsule, Rfl: 0    ondansetron (ZOFRAN-ODT) 4 MG TbDL, Take 1 tablet (4 mg total) by mouth every 6 (six) hours as needed. (Patient not taking: Reported on 5/27/2020), Disp: 12 tablet, Rfl: 0    Current Facility-Administered Medications:     estradiol cypionate 5 mg/mL injection 5 mg, 5 mg, Intramuscular, Q28 Days, Ruby Villegas MD, 5 mg at 02/13/20 1313    estradiol cypionate 5 mg/mL injection 5 mg, 5 mg, Intramuscular, Q28 Days, Jeannie Alicia MD, 5 mg at 09/15/20 1332    testosterone cypionate injection 50 mg, 50 mg, Intramuscular, Q28 Days, Jeannie Alicia MD    testosterone cypionate injection 76 mg, 76 mg, Intramuscular, Q28 Days, Jeannie Alicia MD, 76 mg at 09/15/20 1332    Review of Systems:  General: No fever, chills, or weight loss.  Chest: No chest pain, shortness of breath, or palpitations.  Breast: No pain, masses, or nipple discharge.  Vulva: No pain, lesions, or itching.  Vagina: No relaxation, itching, discharge, or lesions.  Abdomen: No pain, nausea, vomiting, diarrhea, or constipation.  Urinary: No incontinence, nocturia. + dysuria  Extremities:  No leg cramps, edema, or calf pain.  Neurologic: No headaches, dizziness, or visual changes.    Objective:     Vitals:    09/25/20 1506   BP: (!) 148/74   Weight: 68.1 kg (150 lb 2.1 oz)   Height: 5' 2" (1.575 m)   PainSc: 0-No pain     Body mass index is 27.46 kg/m².    PHYSICAL EXAM:  APPEARANCE: Well nourished, well developed, in no acute distress.  AFFECT: WNL, alert and oriented x 3  SKIN: No acne or hirsutism  ABDOMEN: Soft.  No tenderness or masses.  No hepatosplenomegaly.  No hernias. No CVA tenderness bilaterally.  EXTREMITIES: No edema.    Assessment:    Urinary tract infection without hematuria, site unspecified  -     Urine culture  -     Urinalysis  -     nitrofurantoin, macrocrystal-monohydrate, (MACROBID) 100 MG capsule; Take 1 capsule (100 mg total) by mouth 2 (two) times daily. for 7 " days  Dispense: 14 capsule; Refill: 0    Anxiety  -     clonazePAM (KLONOPIN) 0.5 MG tablet; Take 1 tablet (0.5 mg total) by mouth every evening.  Dispense: 30 tablet; Refill: 0        Plan:   Send urine for culture and UA.  Start macrobid.  Increased water intake.  Reviewed preventative measures.  Contact if not improving in 24-48hrs.  Will refill klonopin this once. Needs to get from PCP in the future.    Instructed patient to call if she experiences any side effects or has any questions.

## 2020-09-28 LAB — BACTERIA UR CULT: ABNORMAL

## 2020-10-08 ENCOUNTER — PATIENT MESSAGE (OUTPATIENT)
Dept: OBSTETRICS AND GYNECOLOGY | Facility: CLINIC | Age: 60
End: 2020-10-08

## 2020-10-08 DIAGNOSIS — N39.0 URINARY TRACT INFECTION WITHOUT HEMATURIA, SITE UNSPECIFIED: Primary | ICD-10-CM

## 2020-10-13 ENCOUNTER — CLINICAL SUPPORT (OUTPATIENT)
Dept: OBSTETRICS AND GYNECOLOGY | Facility: CLINIC | Age: 60
End: 2020-10-13
Payer: COMMERCIAL

## 2020-10-13 ENCOUNTER — PATIENT MESSAGE (OUTPATIENT)
Dept: OBSTETRICS AND GYNECOLOGY | Facility: CLINIC | Age: 60
End: 2020-10-13

## 2020-10-13 ENCOUNTER — LAB VISIT (OUTPATIENT)
Dept: LAB | Facility: OTHER | Age: 60
End: 2020-10-13
Attending: PHYSICIAN ASSISTANT
Payer: COMMERCIAL

## 2020-10-13 DIAGNOSIS — I10 ESSENTIAL HYPERTENSION, MALIGNANT: Primary | ICD-10-CM

## 2020-10-13 DIAGNOSIS — N95.1 MENOPAUSAL SYMPTOMS: Primary | ICD-10-CM

## 2020-10-13 DIAGNOSIS — N39.0 URINARY TRACT INFECTION WITHOUT HEMATURIA, SITE UNSPECIFIED: ICD-10-CM

## 2020-10-13 LAB
ALBUMIN SERPL BCP-MCNC: 4 G/DL (ref 3.5–5.2)
ALP SERPL-CCNC: 54 U/L (ref 55–135)
ALT SERPL W/O P-5'-P-CCNC: 15 U/L (ref 10–44)
ANION GAP SERPL CALC-SCNC: 11 MMOL/L (ref 8–16)
AST SERPL-CCNC: 16 U/L (ref 10–40)
BASOPHILS # BLD AUTO: 0.09 K/UL (ref 0–0.2)
BASOPHILS NFR BLD: 1.2 % (ref 0–1.9)
BILIRUB SERPL-MCNC: 0.5 MG/DL (ref 0.1–1)
BUN SERPL-MCNC: 10 MG/DL (ref 8–23)
CALCIUM SERPL-MCNC: 8.8 MG/DL (ref 8.7–10.5)
CHLORIDE SERPL-SCNC: 104 MMOL/L (ref 95–110)
CHOLEST SERPL-MCNC: 196 MG/DL (ref 120–199)
CHOLEST/HDLC SERPL: 4.2 {RATIO} (ref 2–5)
CO2 SERPL-SCNC: 24 MMOL/L (ref 23–29)
CREAT SERPL-MCNC: 0.7 MG/DL (ref 0.5–1.4)
DIFFERENTIAL METHOD: ABNORMAL
EOSINOPHIL # BLD AUTO: 0.1 K/UL (ref 0–0.5)
EOSINOPHIL NFR BLD: 1.2 % (ref 0–8)
ERYTHROCYTE [DISTWIDTH] IN BLOOD BY AUTOMATED COUNT: 12.5 % (ref 11.5–14.5)
EST. GFR  (AFRICAN AMERICAN): >60 ML/MIN/1.73 M^2
EST. GFR  (NON AFRICAN AMERICAN): >60 ML/MIN/1.73 M^2
GLUCOSE SERPL-MCNC: 80 MG/DL (ref 70–110)
HCT VFR BLD AUTO: 43.2 % (ref 37–48.5)
HDLC SERPL-MCNC: 47 MG/DL (ref 40–75)
HDLC SERPL: 24 % (ref 20–50)
HGB BLD-MCNC: 13.6 G/DL (ref 12–16)
IMM GRANULOCYTES # BLD AUTO: 0.02 K/UL (ref 0–0.04)
IMM GRANULOCYTES NFR BLD AUTO: 0.3 % (ref 0–0.5)
LDLC SERPL CALC-MCNC: 133 MG/DL (ref 63–159)
LYMPHOCYTES # BLD AUTO: 2.6 K/UL (ref 1–4.8)
LYMPHOCYTES NFR BLD: 34.9 % (ref 18–48)
MCH RBC QN AUTO: 30.2 PG (ref 27–31)
MCHC RBC AUTO-ENTMCNC: 31.5 G/DL (ref 32–36)
MCV RBC AUTO: 96 FL (ref 82–98)
MONOCYTES # BLD AUTO: 0.5 K/UL (ref 0.3–1)
MONOCYTES NFR BLD: 6.1 % (ref 4–15)
NEUTROPHILS # BLD AUTO: 4.2 K/UL (ref 1.8–7.7)
NEUTROPHILS NFR BLD: 56.3 % (ref 38–73)
NONHDLC SERPL-MCNC: 149 MG/DL
NRBC BLD-RTO: 0 /100 WBC
PLATELET # BLD AUTO: 346 K/UL (ref 150–350)
PMV BLD AUTO: 9.4 FL (ref 9.2–12.9)
POTASSIUM SERPL-SCNC: 3.7 MMOL/L (ref 3.5–5.1)
PROT SERPL-MCNC: 7 G/DL (ref 6–8.4)
RBC # BLD AUTO: 4.51 M/UL (ref 4–5.4)
SODIUM SERPL-SCNC: 139 MMOL/L (ref 136–145)
TRIGL SERPL-MCNC: 80 MG/DL (ref 30–150)
WBC # BLD AUTO: 7.5 K/UL (ref 3.9–12.7)

## 2020-10-13 PROCEDURE — 80053 COMPREHEN METABOLIC PANEL: CPT

## 2020-10-13 PROCEDURE — 96372 PR INJECTION,THERAP/PROPH/DIAG2ST, IM OR SUBCUT: ICD-10-PCS | Mod: S$GLB,,, | Performed by: OBSTETRICS & GYNECOLOGY

## 2020-10-13 PROCEDURE — 87086 URINE CULTURE/COLONY COUNT: CPT

## 2020-10-13 PROCEDURE — 96372 THER/PROPH/DIAG INJ SC/IM: CPT | Mod: S$GLB,,, | Performed by: OBSTETRICS & GYNECOLOGY

## 2020-10-13 PROCEDURE — 85025 COMPLETE CBC W/AUTO DIFF WBC: CPT

## 2020-10-13 PROCEDURE — 99999 PR PBB SHADOW E&M-EST. PATIENT-LVL I: ICD-10-PCS | Mod: PBBFAC,,,

## 2020-10-13 PROCEDURE — 80061 LIPID PANEL: CPT

## 2020-10-13 PROCEDURE — 99999 PR PBB SHADOW E&M-EST. PATIENT-LVL I: CPT | Mod: PBBFAC,,,

## 2020-10-13 PROCEDURE — 36415 COLL VENOUS BLD VENIPUNCTURE: CPT

## 2020-10-13 RX ADMIN — TESTOSTERONE CYPIONATE 76 MG: 200 INJECTION, SOLUTION INTRAMUSCULAR at 01:10

## 2020-10-14 LAB — BACTERIA UR CULT: NO GROWTH

## 2020-11-02 ENCOUNTER — PATIENT MESSAGE (OUTPATIENT)
Dept: OBSTETRICS AND GYNECOLOGY | Facility: CLINIC | Age: 60
End: 2020-11-02

## 2020-11-02 ENCOUNTER — TELEPHONE (OUTPATIENT)
Dept: GASTROENTEROLOGY | Facility: CLINIC | Age: 60
End: 2020-11-02

## 2020-11-02 ENCOUNTER — TELEPHONE (OUTPATIENT)
Dept: OBSTETRICS AND GYNECOLOGY | Facility: CLINIC | Age: 60
End: 2020-11-02

## 2020-11-02 DIAGNOSIS — Z12.31 SCREENING MAMMOGRAM, ENCOUNTER FOR: Primary | ICD-10-CM

## 2020-11-02 NOTE — TELEPHONE ENCOUNTER
MA returned patient call. Mrs. Jackson is aware her next colonoscopy is due in 2021. She verbalized understanding.     Patient is requesting report is faxed to Dr. Hira Jara; Fax- [phone], Phone [phone]. [place], [facility] [place]

## 2020-11-02 NOTE — TELEPHONE ENCOUNTER
----- Message from Awais Meeahn sent at 11/2/2020  3:20 PM CST -----  Regarding: pt wants to sched.  Contact: [phone]  Pt is wanting to sched. A colonoscopy. Pt can be reached @ [phone] thanks

## 2020-11-05 ENCOUNTER — HOSPITAL ENCOUNTER (OUTPATIENT)
Dept: RADIOLOGY | Facility: OTHER | Age: 60
Discharge: HOME | End: 2020-11-05
Attending: OBSTETRICS & GYNECOLOGY
Payer: COMMERCIAL

## 2020-11-05 DIAGNOSIS — Z12.31 SCREENING MAMMOGRAM, ENCOUNTER FOR: ICD-10-CM

## 2020-11-05 PROCEDURE — 77067 SCR MAMMO BI INCL CAD: CPT | Mod: 26,,, | Performed by: RADIOLOGY

## 2020-11-05 PROCEDURE — 77067 MAMMO DIGITAL SCREENING BILAT WITH TOMO: ICD-10-PCS | Mod: 26,,, | Performed by: RADIOLOGY

## 2020-11-05 PROCEDURE — 77067 SCR MAMMO BI INCL CAD: CPT | Mod: TC

## 2020-11-05 PROCEDURE — 77063 MAMMO DIGITAL SCREENING BILAT WITH TOMO: ICD-10-PCS | Mod: 26,,, | Performed by: RADIOLOGY

## 2020-11-05 PROCEDURE — 77063 BREAST TOMOSYNTHESIS BI: CPT | Mod: 26,,, | Performed by: RADIOLOGY

## 2020-11-10 ENCOUNTER — CLINICAL SUPPORT (OUTPATIENT)
Dept: OBSTETRICS AND GYNECOLOGY | Facility: CLINIC | Age: 60
End: 2020-11-10
Payer: COMMERCIAL

## 2020-11-10 PROCEDURE — 96372 THER/PROPH/DIAG INJ SC/IM: CPT | Mod: S$GLB,,, | Performed by: OBSTETRICS & GYNECOLOGY

## 2020-11-10 PROCEDURE — 96372 PR INJECTION,THERAP/PROPH/DIAG2ST, IM OR SUBCUT: ICD-10-PCS | Mod: S$GLB,,, | Performed by: OBSTETRICS & GYNECOLOGY

## 2020-11-10 RX ADMIN — TESTOSTERONE CYPIONATE 76 MG: 200 INJECTION, SOLUTION INTRAMUSCULAR at 01:11

## 2020-12-08 ENCOUNTER — CLINICAL SUPPORT (OUTPATIENT)
Dept: OBSTETRICS AND GYNECOLOGY | Facility: CLINIC | Age: 60
End: 2020-12-08
Payer: COMMERCIAL

## 2020-12-08 DIAGNOSIS — N95.1 MENOPAUSAL SYMPTOMS: Primary | ICD-10-CM

## 2020-12-08 PROCEDURE — 96372 THER/PROPH/DIAG INJ SC/IM: CPT | Mod: S$GLB,,, | Performed by: OBSTETRICS & GYNECOLOGY

## 2020-12-08 PROCEDURE — 96372 PR INJECTION,THERAP/PROPH/DIAG2ST, IM OR SUBCUT: ICD-10-PCS | Mod: S$GLB,,, | Performed by: OBSTETRICS & GYNECOLOGY

## 2020-12-08 RX ORDER — TESTOSTERONE CYPIONATE 200 MG/ML
76 INJECTION, SOLUTION INTRAMUSCULAR
Status: COMPLETED | OUTPATIENT
Start: 2020-12-08 | End: 2021-04-26

## 2020-12-08 RX ADMIN — TESTOSTERONE CYPIONATE 76 MG: 200 INJECTION, SOLUTION INTRAMUSCULAR at 01:12

## 2020-12-08 NOTE — PROGRESS NOTES
Here for hormone therapy injection, no complaints at this time, Injection given as ordered, tolerated well, no report of pain prior to or after injection. Return to clinic as scheduled.     Site - LB    [id number] mg  Depo Estradiol 5 mg    Clinic Supplied Medication

## 2020-12-31 ENCOUNTER — OFFICE VISIT (OUTPATIENT)
Dept: URGENT CARE | Facility: CLINIC | Age: 60
End: 2020-12-31
Payer: COMMERCIAL

## 2020-12-31 ENCOUNTER — TELEPHONE (OUTPATIENT)
Dept: OBSTETRICS AND GYNECOLOGY | Facility: CLINIC | Age: 60
End: 2020-12-31

## 2020-12-31 VITALS
HEIGHT: 62 IN | RESPIRATION RATE: 16 BRPM | BODY MASS INDEX: 27.6 KG/M2 | TEMPERATURE: 98 F | DIASTOLIC BLOOD PRESSURE: 71 MMHG | OXYGEN SATURATION: 99 % | WEIGHT: 150 LBS | HEART RATE: 82 BPM | SYSTOLIC BLOOD PRESSURE: 120 MMHG

## 2020-12-31 DIAGNOSIS — Z11.59 ENCOUNTER FOR SCREENING FOR OTHER VIRAL DISEASES: ICD-10-CM

## 2020-12-31 DIAGNOSIS — N39.0 URINARY TRACT INFECTION WITH HEMATURIA, SITE UNSPECIFIED: Primary | ICD-10-CM

## 2020-12-31 DIAGNOSIS — R39.9 UTI SYMPTOMS: Primary | ICD-10-CM

## 2020-12-31 DIAGNOSIS — R31.9 URINARY TRACT INFECTION WITH HEMATURIA, SITE UNSPECIFIED: Primary | ICD-10-CM

## 2020-12-31 LAB
BILIRUB UR QL STRIP: NEGATIVE
CTP QC/QA: YES
GLUCOSE UR QL STRIP: NEGATIVE
KETONES UR QL STRIP: NEGATIVE
LEUKOCYTE ESTERASE UR QL STRIP: POSITIVE
PH, POC UA: 5.5 (ref 5–8)
POC BLOOD, URINE: POSITIVE
POC NITRATES, URINE: NEGATIVE
PROT UR QL STRIP: NEGATIVE
SARS-COV-2 RDRP RESP QL NAA+PROBE: NEGATIVE
SP GR UR STRIP: 1.02 (ref 1–1.03)
UROBILINOGEN UR STRIP-ACNC: NORMAL (ref 0.1–1.1)

## 2020-12-31 PROCEDURE — 99213 OFFICE O/P EST LOW 20 MIN: CPT | Mod: 25,S$GLB,, | Performed by: NURSE PRACTITIONER

## 2020-12-31 PROCEDURE — 99213 PR OFFICE/OUTPT VISIT, EST, LEVL III, 20-29 MIN: ICD-10-PCS | Mod: 25,S$GLB,, | Performed by: NURSE PRACTITIONER

## 2020-12-31 PROCEDURE — 87086 URINE CULTURE/COLONY COUNT: CPT

## 2020-12-31 PROCEDURE — U0002: ICD-10-PCS | Mod: QW,S$GLB,, | Performed by: NURSE PRACTITIONER

## 2020-12-31 PROCEDURE — 87088 URINE BACTERIA CULTURE: CPT

## 2020-12-31 PROCEDURE — 81003 URINALYSIS AUTO W/O SCOPE: CPT | Mod: QW,S$GLB,, | Performed by: NURSE PRACTITIONER

## 2020-12-31 PROCEDURE — 87186 SC STD MICRODIL/AGAR DIL: CPT

## 2020-12-31 PROCEDURE — U0002 COVID-19 LAB TEST NON-CDC: HCPCS | Mod: QW,S$GLB,, | Performed by: NURSE PRACTITIONER

## 2020-12-31 PROCEDURE — 81003 POCT URINALYSIS, DIPSTICK, AUTOMATED, W/O SCOPE: ICD-10-PCS | Mod: QW,S$GLB,, | Performed by: NURSE PRACTITIONER

## 2020-12-31 PROCEDURE — 87077 CULTURE AEROBIC IDENTIFY: CPT

## 2020-12-31 RX ORDER — NITROFURANTOIN 25; 75 MG/1; MG/1
100 CAPSULE ORAL 2 TIMES DAILY
Qty: 10 CAPSULE | Refills: 0 | Status: SHIPPED | OUTPATIENT
Start: 2020-12-31 | End: 2021-01-05

## 2020-12-31 NOTE — PATIENT INSTRUCTIONS
"Return to Urgent Care or go to ER if symptoms worsen or fail to improve.  Follow up with PCP as recommended for further management.   We will contact you in 3-5 days with your urine culture result.      Bladder Infection, Female (Adult)    Urine is normally doesn't have any bacteria in it. But bacteria can get into the urinary tract from the skin around the rectum. Or they can travel in the blood from elsewhere in the body. Once they are in your urinary tract, they can cause infection in the urethra (urethritis), the bladder (cystitis), or the kidneys (pyelonephritis).  The most common place for an infection is in the bladder. This is called a bladder infection. This is one of the most common infections in women. Most bladder infections are easily treated. They are not serious unless the infection spreads to the kidney.  The phrases "bladder infection," "UTI," and "cystitis" are often used to describe the same thing. But they are not always the same. Cystitis is an inflammation of the bladder. The most common cause of cystitis is an infection.  Symptoms  The infection causes inflammation in the urethra and bladder. This causes many of the symptoms. The most common symptoms of a bladder infection are:  · Pain or burning when urinating  · Having to urinate more often than usual  · Urgent need to urinate  · Only a small amount of urine comes out  · Blood in urine  · Abdominal discomfort. This is usually in the lower abdomen above the pubic bone.  · Cloudy urine  · Strong- or bad-smelling urine  · Unable to urinate (urinary retention)  · Unable to hold urine in (urinary incontinence)  · Fever  · Loss of appetite  · Confusion (in older adults)  Causes  Bladder infections are not contagious. You can't get one from someone else, from a toilet seat, or from sharing a bath.  The most common cause of bladder infections is bacteria from the bowels. The bacteria get onto the skin around the opening of the urethra. From there, " they can get into the urine and travel up to the bladder, causing inflammation and infection. This usually happens because of:  · Wiping improperly after urinating. Always wipe from front to back.  · Bowel incontinence  · Pregnancy  · Procedures such as having a catheter inserted  · Older age  · Not emptying your bladder. This can allow bacteria a chance to grow in your urine.  · Dehydration  · Constipation  · Sex  · Use of a diaphragm for birth control   Treatment  Bladder infections are diagnosed by a urine test. They are treated with antibiotics and usually clear up quickly without complications. Treatment helps prevent a more serious kidney infection.  Medicines  Medicines can help in the treatment of a bladder infection:  · Take antibiotics until they are used up, even if you feel better. It is important to finish them to make sure the infection has cleared.  · You can use acetaminophen or ibuprofen for pain, fever, or discomfort, unless another medicine was prescribed. If you have chronic liver or kidney disease, talk with your healthcare provider before using these medicines. Also talk with your provider if you've ever had a stomach ulcer or gastrointestinal bleeding, or are taking blood-thinner medicines.  · If you are given phenazopydridine to reduce burning with urination, it will cause your urine to become a bright orange color. This can stain clothing.  Care and prevention  These self-care steps can help prevent future infections:  · Drink plenty of fluids to prevent dehydration and flush out your bladder. Do this unless you must restrict fluids for other health reasons, or your doctor told you not to.  · Proper cleaning after going to the bathroom is important. Wipe from front to back after using the toilet to prevent the spread of bacteria.  · Urinate more often. Don't try to hold urine in for a long time.  · Wear loose-fitting clothes and cotton underwear. Avoid tight-fitting pants.  · Improve your  diet and prevent constipation. Eat more fresh fruit and vegetables, and fiber, and less junk and fatty foods.  · Avoid sex until your symptoms are gone.  · Avoid caffeine, alcohol, and spicy foods. These can irritate your bladder.  · Urinate right after intercourse to flush out your bladder.  · If you use birth control pills and have frequent bladder infections, discuss it with your doctor.  Follow-up care  Call your healthcare provider if all symptoms are not gone after 3 days of treatment. This is especially important if you have repeat infections.  If a culture was done, you will be told if your treatment needs to be changed. If directed, you can call to find out the results.  If X-rays were done, you will be told if the results will affect your treatment.  Call 911  Call 911 if any of the following occur:  · Trouble breathing  · Hard to wake up or confusion  · Fainting or loss of consciousness  · Rapid heart rate  When to seek medical advice  Call your healthcare provider right away if any of these occur:  · Fever of 100.4ºF (38.0ºC) or higher, or as directed by your healthcare provider  · Symptoms are not better by the third day of treatment  · Back or belly (abdominal) pain that gets worse  · Repeated vomiting, or unable to keep medicine down  · Weakness or dizziness  · Vaginal discharge  · Pain, redness, or swelling in the outer vaginal area (labia)  Date Last Reviewed: 10/1/2016  © 3280-7735 [employer]. [address] [place] [address], [place] [address]. All rights reserved. This information is not intended as a substitute for professional medical care. Always follow your healthcare professional's instructions.

## 2020-12-31 NOTE — TELEPHONE ENCOUNTER
----- Message from Emily Lopes sent at 12/31/2020 12:21 PM CST -----  Regarding: Patient advice  Type:  Needs Medical Advice    Who Called:MICHAEL DAVENPORT [[id number]]    Symptoms (please be specific): UTI     How long has patient had these symptoms: yesterday     Pharmacy name and phone #:   [facility] [employer] #[id number] [address]    Best Call Back Number:  [phone]    Additional Information: he would like a call once it is called in

## 2020-12-31 NOTE — PROGRESS NOTES
"Subjective:       Patient ID: Alma Jackson is a 60 y.o. female.    Vitals:  height is 5' 2" (1.575 m) and weight is 68 kg (150 lb). Her temperature is 97.6 °F (36.4 °C). Her blood pressure is 120/71 and her pulse is 82. Her respiration is 16 and oxygen saturation is 99%.     Chief Complaint: COVID-19 Concerns and Urinary Tract Infection    Urinary Tract Infection   This is a new problem. The current episode started in the past 7 days. The problem occurs every urination. The problem has been unchanged. The quality of the pain is described as aching. There has been no fever. Associated symptoms include frequency. Pertinent negatives include no chills, flank pain, nausea, urgency, vomiting or rash.       Constitution: Negative for chills, fatigue and fever.   HENT: Negative for congestion and sore throat.    Neck: Negative for painful lymph nodes.   Cardiovascular: Negative for chest pain and leg swelling.   Eyes: Negative for double vision and blurred vision.   Respiratory: Negative for cough and shortness of breath.    Gastrointestinal: Negative for nausea, vomiting and diarrhea.   Genitourinary: Positive for dysuria and frequency. Negative for urgency, urine decreased, flank pain and history of kidney stones.   Musculoskeletal: Negative for joint pain, joint swelling, muscle cramps and muscle ache.   Skin: Negative for color change, pale, rash and bruising.   Allergic/Immunologic: Negative for seasonal allergies.   Neurological: Negative for dizziness, history of vertigo, light-headedness, passing out and headaches.   Hematologic/Lymphatic: Negative for swollen lymph nodes.   Psychiatric/Behavioral: Negative for nervous/anxious, sleep disturbance and depression. The patient is not nervous/anxious.        Objective:      Physical Exam   Constitutional: She is oriented to person, place, and time. She appears well-developed.   HENT:   Head: Normocephalic and atraumatic.   Ears:   Right Ear: External ear " normal.   Left Ear: External ear normal.   Nose: Nose normal. No nasal deformity. No epistaxis.   Mouth/Throat: Oropharynx is clear and moist and mucous membranes are normal.   Eyes: Lids are normal.   Neck: Trachea normal, normal range of motion and phonation normal. Neck supple.   Cardiovascular: Normal pulses.   Pulmonary/Chest: Effort normal.   Abdominal: Soft. Normal appearance and bowel sounds are normal. She exhibits no distension. There is no abdominal tenderness.   Neurological: She is alert and oriented to person, place, and time.   Skin: Skin is warm, dry and intact. Psychiatric: Her speech is normal and behavior is normal.   Nursing note and vitals reviewed.        Results for orders placed or performed in visit on 12/31/20   POCT COVID-19 Rapid Screening   Result Value Ref Range    POC Rapid COVID Negative Negative     Acceptable Yes    POCT Urinalysis, Dipstick, Automated, W/O Scope   Result Value Ref Range    POC Blood, Urine Positive (A) Negative    POC Bilirubin, Urine Negative Negative    POC Urobilinogen, Urine normal 0.1 - 1.1    POC Ketones, Urine Negative Negative    POC Protein, Urine Negative Negative    POC Nitrates, Urine Negative Negative    POC Glucose, Urine Negative Negative    pH, UA 5.5 5 - 8    POC Specific Gravity, Urine 1.025 1.003 - 1.029    POC Leukocytes, Urine Positive (A) Negative       Assessment:       1. Urinary tract infection with hematuria, site unspecified    2. Encounter for screening for other viral diseases        Plan:         Urinary tract infection with hematuria, site unspecified  -     POCT Urinalysis, Dipstick, Automated, W/O Scope  -     Culture, Urine  -     nitrofurantoin, macrocrystal-monohydrate, (MACROBID) 100 MG capsule; Take 1 capsule (100 mg total) by mouth 2 (two) times daily. for 5 days  Dispense: 10 capsule; Refill: 0    Encounter for screening for other viral diseases  -     POCT COVID-19 Rapid Screening

## 2020-12-31 NOTE — TELEPHONE ENCOUNTER
Called pt. Pt states she is at the [facility] and is c/o UTI syntomes. Pt states she took the urine test at [facility] and its positive for a UTI and needs a script to treat. Informed pt that another order for a Urine culture would be ordered if needed so that urgent care could treat her. Pt said okay thanks for calling.

## 2021-01-03 ENCOUNTER — TELEPHONE (OUTPATIENT)
Dept: URGENT CARE | Facility: CLINIC | Age: 61
End: 2021-01-03

## 2021-01-03 LAB — BACTERIA UR CULT: ABNORMAL

## 2021-01-04 ENCOUNTER — TELEPHONE (OUTPATIENT)
Dept: URGENT CARE | Facility: CLINIC | Age: 61
End: 2021-01-04

## 2021-01-04 ENCOUNTER — CLINICAL SUPPORT (OUTPATIENT)
Dept: OBSTETRICS AND GYNECOLOGY | Facility: CLINIC | Age: 61
End: 2021-01-04
Payer: COMMERCIAL

## 2021-01-04 PROCEDURE — 96372 PR INJECTION,THERAP/PROPH/DIAG2ST, IM OR SUBCUT: ICD-10-PCS | Mod: S$GLB,,, | Performed by: OBSTETRICS & GYNECOLOGY

## 2021-01-04 PROCEDURE — 96372 THER/PROPH/DIAG INJ SC/IM: CPT | Mod: S$GLB,,, | Performed by: OBSTETRICS & GYNECOLOGY

## 2021-01-04 RX ADMIN — TESTOSTERONE CYPIONATE 76 MG: 200 INJECTION, SOLUTION INTRAMUSCULAR at 02:01

## 2021-01-21 ENCOUNTER — CLINICAL SUPPORT (OUTPATIENT)
Dept: URGENT CARE | Facility: CLINIC | Age: 61
End: 2021-01-21
Payer: COMMERCIAL

## 2021-01-21 DIAGNOSIS — Z11.9 ENCOUNTER FOR SCREENING EXAMINATION FOR INFECTIOUS DISEASE: Primary | ICD-10-CM

## 2021-01-21 LAB
CTP QC/QA: YES
SARS-COV-2 RDRP RESP QL NAA+PROBE: NEGATIVE

## 2021-01-21 PROCEDURE — U0002: ICD-10-PCS | Mod: QW,S$GLB,, | Performed by: FAMILY MEDICINE

## 2021-01-21 PROCEDURE — U0002 COVID-19 LAB TEST NON-CDC: HCPCS | Mod: QW,S$GLB,, | Performed by: FAMILY MEDICINE

## 2021-01-23 ENCOUNTER — CLINICAL SUPPORT (OUTPATIENT)
Dept: URGENT CARE | Facility: CLINIC | Age: 61
End: 2021-01-23
Payer: COMMERCIAL

## 2021-01-23 DIAGNOSIS — Z11.59 SCREENING FOR VIRAL DISEASE: Primary | ICD-10-CM

## 2021-01-23 LAB
CTP QC/QA: YES
SARS-COV-2 RDRP RESP QL NAA+PROBE: NEGATIVE

## 2021-01-23 PROCEDURE — U0002 COVID-19 LAB TEST NON-CDC: HCPCS | Mod: QW,S$GLB,, | Performed by: PHYSICIAN ASSISTANT

## 2021-01-23 PROCEDURE — U0002: ICD-10-PCS | Mod: QW,S$GLB,, | Performed by: PHYSICIAN ASSISTANT

## 2021-02-01 ENCOUNTER — CLINICAL SUPPORT (OUTPATIENT)
Dept: OBSTETRICS AND GYNECOLOGY | Facility: CLINIC | Age: 61
End: 2021-02-01
Payer: COMMERCIAL

## 2021-02-01 DIAGNOSIS — N95.1 MENOPAUSAL SYMPTOMS: Primary | ICD-10-CM

## 2021-02-01 PROCEDURE — 96372 THER/PROPH/DIAG INJ SC/IM: CPT | Mod: S$GLB,,, | Performed by: OBSTETRICS & GYNECOLOGY

## 2021-02-01 PROCEDURE — 96372 PR INJECTION,THERAP/PROPH/DIAG2ST, IM OR SUBCUT: ICD-10-PCS | Mod: S$GLB,,, | Performed by: OBSTETRICS & GYNECOLOGY

## 2021-02-01 RX ADMIN — TESTOSTERONE CYPIONATE 76 MG: 200 INJECTION, SOLUTION INTRAMUSCULAR at 02:02

## 2021-02-09 ENCOUNTER — OFFICE VISIT (OUTPATIENT)
Dept: OBSTETRICS AND GYNECOLOGY | Facility: CLINIC | Age: 61
End: 2021-02-09
Payer: COMMERCIAL

## 2021-02-09 VITALS
BODY MASS INDEX: 28.07 KG/M2 | WEIGHT: 152.56 LBS | SYSTOLIC BLOOD PRESSURE: 118 MMHG | HEIGHT: 62 IN | DIASTOLIC BLOOD PRESSURE: 70 MMHG

## 2021-02-09 DIAGNOSIS — Z01.419 ROUTINE GYNECOLOGICAL EXAMINATION: Primary | ICD-10-CM

## 2021-02-09 DIAGNOSIS — N95.1 MENOPAUSAL SYMPTOMS: ICD-10-CM

## 2021-02-09 DIAGNOSIS — N89.8 VAGINAL DISCHARGE: ICD-10-CM

## 2021-02-09 PROCEDURE — 87510 GARDNER VAG DNA DIR PROBE: CPT

## 2021-02-09 PROCEDURE — 99396 PR PREVENTIVE VISIT,EST,40-64: ICD-10-PCS | Mod: S$GLB,,, | Performed by: PHYSICIAN ASSISTANT

## 2021-02-09 PROCEDURE — 87660 TRICHOMONAS VAGIN DIR PROBE: CPT

## 2021-02-09 PROCEDURE — 99396 PREV VISIT EST AGE 40-64: CPT | Mod: S$GLB,,, | Performed by: PHYSICIAN ASSISTANT

## 2021-02-09 RX ORDER — FLUCONAZOLE 150 MG/1
150 TABLET ORAL DAILY
Qty: 1 TABLET | Refills: 0 | Status: SHIPPED | OUTPATIENT
Start: 2021-02-09 | End: 2021-02-10

## 2021-02-10 ENCOUNTER — TELEPHONE (OUTPATIENT)
Dept: OBSTETRICS AND GYNECOLOGY | Facility: CLINIC | Age: 61
End: 2021-02-10

## 2021-02-13 LAB
CANDIDA RRNA VAG QL PROBE: NOT DETECTED
G VAGINALIS RRNA GENITAL QL PROBE: NOT DETECTED
T VAGINALIS RRNA GENITAL QL PROBE: NOT DETECTED

## 2021-02-14 ENCOUNTER — PATIENT MESSAGE (OUTPATIENT)
Dept: OBSTETRICS AND GYNECOLOGY | Facility: CLINIC | Age: 61
End: 2021-02-14

## 2021-02-19 RX ORDER — FLUCONAZOLE 150 MG/1
150 TABLET ORAL DAILY
Qty: 3 TABLET | Refills: 0 | Status: SHIPPED | OUTPATIENT
Start: 2021-02-19 | End: 2021-02-20

## 2021-02-26 ENCOUNTER — IMMUNIZATION (OUTPATIENT)
Dept: INTERNAL MEDICINE | Facility: CLINIC | Age: 61
End: 2021-02-26
Payer: COMMERCIAL

## 2021-02-26 DIAGNOSIS — Z23 NEED FOR VACCINATION: Primary | ICD-10-CM

## 2021-02-26 PROCEDURE — 91300 COVID-19, MRNA, LNP-S, PF, 30 MCG/0.3 ML DOSE VACCINE: CPT | Mod: PBBFAC | Performed by: INTERNAL MEDICINE

## 2021-03-01 ENCOUNTER — CLINICAL SUPPORT (OUTPATIENT)
Dept: OBSTETRICS AND GYNECOLOGY | Facility: CLINIC | Age: 61
End: 2021-03-01
Payer: COMMERCIAL

## 2021-03-01 PROCEDURE — 96372 THER/PROPH/DIAG INJ SC/IM: CPT | Mod: S$GLB,,, | Performed by: OBSTETRICS & GYNECOLOGY

## 2021-03-01 PROCEDURE — 96372 PR INJECTION,THERAP/PROPH/DIAG2ST, IM OR SUBCUT: ICD-10-PCS | Mod: S$GLB,,, | Performed by: OBSTETRICS & GYNECOLOGY

## 2021-03-01 RX ADMIN — TESTOSTERONE CYPIONATE 76 MG: 200 INJECTION, SOLUTION INTRAMUSCULAR at 01:03

## 2021-03-18 ENCOUNTER — LAB VISIT (OUTPATIENT)
Dept: LAB | Facility: HOSPITAL | Age: 61
End: 2021-03-18
Attending: PHYSICIAN ASSISTANT
Payer: COMMERCIAL

## 2021-03-18 DIAGNOSIS — N95.1 MENOPAUSAL SYMPTOMS: ICD-10-CM

## 2021-03-18 PROCEDURE — 84144 ASSAY OF PROGESTERONE: CPT | Performed by: PHYSICIAN ASSISTANT

## 2021-03-18 PROCEDURE — 82670 ASSAY OF TOTAL ESTRADIOL: CPT | Performed by: PHYSICIAN ASSISTANT

## 2021-03-18 PROCEDURE — 36415 COLL VENOUS BLD VENIPUNCTURE: CPT | Mod: PN | Performed by: PHYSICIAN ASSISTANT

## 2021-03-18 PROCEDURE — 84402 ASSAY OF FREE TESTOSTERONE: CPT | Performed by: PHYSICIAN ASSISTANT

## 2021-03-19 ENCOUNTER — IMMUNIZATION (OUTPATIENT)
Dept: INTERNAL MEDICINE | Facility: CLINIC | Age: 61
End: 2021-03-19
Payer: COMMERCIAL

## 2021-03-19 DIAGNOSIS — Z23 NEED FOR VACCINATION: Primary | ICD-10-CM

## 2021-03-19 LAB
ESTRADIOL SERPL-MCNC: 35 PG/ML
PROGEST SERPL-MCNC: 5.4 NG/ML

## 2021-03-19 PROCEDURE — 0002A COVID-19, MRNA, LNP-S, PF, 30 MCG/0.3 ML DOSE VACCINE: CPT | Mod: PBBFAC

## 2021-03-19 PROCEDURE — 91300 COVID-19, MRNA, LNP-S, PF, 30 MCG/0.3 ML DOSE VACCINE: CPT | Mod: PBBFAC

## 2021-03-23 LAB — TESTOST FREE SERPL-MCNC: 1.7 PG/ML

## 2021-03-29 ENCOUNTER — CLINICAL SUPPORT (OUTPATIENT)
Dept: OBSTETRICS AND GYNECOLOGY | Facility: CLINIC | Age: 61
End: 2021-03-29
Payer: COMMERCIAL

## 2021-03-29 DIAGNOSIS — N95.1 MENOPAUSAL SYMPTOMS: Primary | ICD-10-CM

## 2021-03-29 PROCEDURE — 96372 PR INJECTION,THERAP/PROPH/DIAG2ST, IM OR SUBCUT: ICD-10-PCS | Mod: S$GLB,,, | Performed by: OBSTETRICS & GYNECOLOGY

## 2021-03-29 PROCEDURE — 96372 THER/PROPH/DIAG INJ SC/IM: CPT | Mod: S$GLB,,, | Performed by: OBSTETRICS & GYNECOLOGY

## 2021-03-29 RX ADMIN — TESTOSTERONE CYPIONATE 76 MG: 200 INJECTION, SOLUTION INTRAMUSCULAR at 01:03

## 2021-04-06 ENCOUNTER — OFFICE VISIT (OUTPATIENT)
Dept: OBSTETRICS AND GYNECOLOGY | Facility: CLINIC | Age: 61
End: 2021-04-06
Payer: COMMERCIAL

## 2021-04-06 VITALS
BODY MASS INDEX: 27.75 KG/M2 | DIASTOLIC BLOOD PRESSURE: 80 MMHG | WEIGHT: 150.81 LBS | SYSTOLIC BLOOD PRESSURE: 122 MMHG | HEIGHT: 62 IN

## 2021-04-06 DIAGNOSIS — N89.8 VAGINAL IRRITATION: ICD-10-CM

## 2021-04-06 DIAGNOSIS — N95.2 VAGINAL ATROPHY: Primary | ICD-10-CM

## 2021-04-06 PROCEDURE — 99213 OFFICE O/P EST LOW 20 MIN: CPT | Mod: S$GLB,,, | Performed by: PHYSICIAN ASSISTANT

## 2021-04-06 PROCEDURE — 87481 CANDIDA DNA AMP PROBE: CPT | Mod: 59 | Performed by: PHYSICIAN ASSISTANT

## 2021-04-06 PROCEDURE — 99213 PR OFFICE/OUTPT VISIT, EST, LEVL III, 20-29 MIN: ICD-10-PCS | Mod: S$GLB,,, | Performed by: PHYSICIAN ASSISTANT

## 2021-04-06 RX ORDER — PRASTERONE 6.5 MG/1
6.5 INSERT VAGINAL NIGHTLY
Qty: 30 EACH | Refills: 11 | Status: SHIPPED | OUTPATIENT
Start: 2021-04-06 | End: 2021-11-26

## 2021-04-07 LAB
BACTERIAL VAGINOSIS DNA: NEGATIVE
CANDIDA GLABRATA DNA: NEGATIVE
CANDIDA KRUSEI DNA: NEGATIVE
CANDIDA RRNA VAG QL PROBE: NEGATIVE
T VAGINALIS RRNA GENITAL QL PROBE: NEGATIVE

## 2021-04-26 ENCOUNTER — CLINICAL SUPPORT (OUTPATIENT)
Dept: OBSTETRICS AND GYNECOLOGY | Facility: CLINIC | Age: 61
End: 2021-04-26
Payer: COMMERCIAL

## 2021-04-26 DIAGNOSIS — N95.1 MENOPAUSAL SYMPTOMS: Primary | ICD-10-CM

## 2021-04-26 PROCEDURE — 96372 PR INJECTION,THERAP/PROPH/DIAG2ST, IM OR SUBCUT: ICD-10-PCS | Mod: S$GLB,,, | Performed by: OBSTETRICS & GYNECOLOGY

## 2021-04-26 PROCEDURE — 96372 THER/PROPH/DIAG INJ SC/IM: CPT | Mod: S$GLB,,, | Performed by: OBSTETRICS & GYNECOLOGY

## 2021-04-26 RX ADMIN — TESTOSTERONE CYPIONATE 76 MG: 200 INJECTION, SOLUTION INTRAMUSCULAR at 01:04

## 2021-05-25 ENCOUNTER — CLINICAL SUPPORT (OUTPATIENT)
Dept: OBSTETRICS AND GYNECOLOGY | Facility: CLINIC | Age: 61
End: 2021-05-25
Payer: COMMERCIAL

## 2021-05-25 DIAGNOSIS — N95.1 MENOPAUSAL SYMPTOMS: Primary | ICD-10-CM

## 2021-05-25 PROCEDURE — 96372 PR INJECTION,THERAP/PROPH/DIAG2ST, IM OR SUBCUT: ICD-10-PCS | Mod: S$GLB,,, | Performed by: PHYSICIAN ASSISTANT

## 2021-05-25 PROCEDURE — 96372 THER/PROPH/DIAG INJ SC/IM: CPT | Mod: S$GLB,,, | Performed by: PHYSICIAN ASSISTANT

## 2021-05-25 RX ORDER — TESTOSTERONE CYPIONATE 200 MG/ML
76 INJECTION, SOLUTION INTRAMUSCULAR
Status: COMPLETED | OUTPATIENT
Start: 2021-05-25 | End: 2021-05-25

## 2021-05-25 RX ADMIN — TESTOSTERONE CYPIONATE 76 MG: 200 INJECTION, SOLUTION INTRAMUSCULAR at 01:05

## 2021-06-22 ENCOUNTER — CLINICAL SUPPORT (OUTPATIENT)
Dept: OBSTETRICS AND GYNECOLOGY | Facility: CLINIC | Age: 61
End: 2021-06-22
Payer: COMMERCIAL

## 2021-06-22 DIAGNOSIS — N95.1 MENOPAUSAL SYMPTOMS: Primary | ICD-10-CM

## 2021-06-22 PROCEDURE — 96372 PR INJECTION,THERAP/PROPH/DIAG2ST, IM OR SUBCUT: ICD-10-PCS | Mod: S$GLB,,, | Performed by: PHYSICIAN ASSISTANT

## 2021-06-22 PROCEDURE — 96372 THER/PROPH/DIAG INJ SC/IM: CPT | Mod: S$GLB,,, | Performed by: PHYSICIAN ASSISTANT

## 2021-06-22 RX ORDER — TESTOSTERONE CYPIONATE 200 MG/ML
76 INJECTION, SOLUTION INTRAMUSCULAR
Status: COMPLETED | OUTPATIENT
Start: 2021-06-22 | End: 2021-06-22

## 2021-06-22 RX ADMIN — TESTOSTERONE CYPIONATE 76 MG: 200 INJECTION, SOLUTION INTRAMUSCULAR at 01:06

## 2021-07-20 ENCOUNTER — CLINICAL SUPPORT (OUTPATIENT)
Dept: OBSTETRICS AND GYNECOLOGY | Facility: CLINIC | Age: 61
End: 2021-07-20
Payer: COMMERCIAL

## 2021-07-20 DIAGNOSIS — N95.1 MENOPAUSAL SYMPTOMS: Primary | ICD-10-CM

## 2021-07-20 PROCEDURE — 96372 THER/PROPH/DIAG INJ SC/IM: CPT | Mod: S$GLB,,, | Performed by: PHYSICIAN ASSISTANT

## 2021-07-20 PROCEDURE — 96372 PR INJECTION,THERAP/PROPH/DIAG2ST, IM OR SUBCUT: ICD-10-PCS | Mod: S$GLB,,, | Performed by: PHYSICIAN ASSISTANT

## 2021-07-20 RX ORDER — TESTOSTERONE CYPIONATE 200 MG/ML
76 INJECTION, SOLUTION INTRAMUSCULAR
Status: COMPLETED | OUTPATIENT
Start: 2021-07-20 | End: 2021-07-20

## 2021-07-20 RX ADMIN — TESTOSTERONE CYPIONATE 76 MG: 200 INJECTION, SOLUTION INTRAMUSCULAR at 01:07

## 2021-07-21 ENCOUNTER — CLINICAL SUPPORT (OUTPATIENT)
Dept: URGENT CARE | Facility: CLINIC | Age: 61
End: 2021-07-21
Payer: COMMERCIAL

## 2021-07-21 DIAGNOSIS — Z20.822 CONTACT WITH AND (SUSPECTED) EXPOSURE TO COVID-19: Primary | ICD-10-CM

## 2021-07-21 LAB
CTP QC/QA: YES
SARS-COV-2 RDRP RESP QL NAA+PROBE: NEGATIVE

## 2021-07-21 PROCEDURE — U0002: ICD-10-PCS | Mod: QW,S$GLB,, | Performed by: INTERNAL MEDICINE

## 2021-07-21 PROCEDURE — 99211 PR OFFICE/OUTPT VISIT, EST, LEVL I: ICD-10-PCS | Mod: S$GLB,,, | Performed by: INTERNAL MEDICINE

## 2021-07-21 PROCEDURE — 99211 OFF/OP EST MAY X REQ PHY/QHP: CPT | Mod: S$GLB,,, | Performed by: INTERNAL MEDICINE

## 2021-07-21 PROCEDURE — U0002 COVID-19 LAB TEST NON-CDC: HCPCS | Mod: QW,S$GLB,, | Performed by: INTERNAL MEDICINE

## 2021-08-04 ENCOUNTER — LAB VISIT (OUTPATIENT)
Dept: PRIMARY CARE CLINIC | Facility: CLINIC | Age: 61
End: 2021-08-04
Payer: COMMERCIAL

## 2021-08-04 DIAGNOSIS — R05.9 COUGH: ICD-10-CM

## 2021-08-04 PROCEDURE — U0005 INFEC AGEN DETEC AMPLI PROBE: HCPCS | Performed by: INTERNAL MEDICINE

## 2021-08-04 PROCEDURE — U0003 INFECTIOUS AGENT DETECTION BY NUCLEIC ACID (DNA OR RNA); SEVERE ACUTE RESPIRATORY SYNDROME CORONAVIRUS 2 (SARS-COV-2) (CORONAVIRUS DISEASE [COVID-19]), AMPLIFIED PROBE TECHNIQUE, MAKING USE OF HIGH THROUGHPUT TECHNOLOGIES AS DESCRIBED BY CMS-2020-01-R: HCPCS | Performed by: INTERNAL MEDICINE

## 2021-08-05 LAB
SARS-COV-2 RNA RESP QL NAA+PROBE: DETECTED
SARS-COV-2- CYCLE NUMBER: 20.86

## 2021-08-06 DIAGNOSIS — U07.1 COVID-19 VIRUS DETECTED: ICD-10-CM

## 2021-08-17 ENCOUNTER — CLINICAL SUPPORT (OUTPATIENT)
Dept: OBSTETRICS AND GYNECOLOGY | Facility: CLINIC | Age: 61
End: 2021-08-17
Payer: COMMERCIAL

## 2021-08-17 DIAGNOSIS — N95.1 MENOPAUSAL SYMPTOMS: Primary | ICD-10-CM

## 2021-08-17 PROCEDURE — 96372 THER/PROPH/DIAG INJ SC/IM: CPT | Mod: S$GLB,,, | Performed by: PHYSICIAN ASSISTANT

## 2021-08-17 PROCEDURE — 96372 PR INJECTION,THERAP/PROPH/DIAG2ST, IM OR SUBCUT: ICD-10-PCS | Mod: S$GLB,,, | Performed by: PHYSICIAN ASSISTANT

## 2021-08-17 RX ORDER — TESTOSTERONE CYPIONATE 200 MG/ML
76 INJECTION, SOLUTION INTRAMUSCULAR
Status: COMPLETED | OUTPATIENT
Start: 2021-08-17 | End: 2021-11-23

## 2021-08-17 RX ADMIN — TESTOSTERONE CYPIONATE 76 MG: 200 INJECTION, SOLUTION INTRAMUSCULAR at 03:08

## 2021-09-14 ENCOUNTER — CLINICAL SUPPORT (OUTPATIENT)
Dept: OBSTETRICS AND GYNECOLOGY | Facility: CLINIC | Age: 61
End: 2021-09-14
Payer: COMMERCIAL

## 2021-09-14 DIAGNOSIS — N95.1 MENOPAUSAL SYMPTOMS: Primary | ICD-10-CM

## 2021-09-14 PROCEDURE — 96372 THER/PROPH/DIAG INJ SC/IM: CPT | Mod: S$GLB,,, | Performed by: PHYSICIAN ASSISTANT

## 2021-09-14 PROCEDURE — 96372 PR INJECTION,THERAP/PROPH/DIAG2ST, IM OR SUBCUT: ICD-10-PCS | Mod: S$GLB,,, | Performed by: PHYSICIAN ASSISTANT

## 2021-09-14 RX ADMIN — TESTOSTERONE CYPIONATE 76 MG: 200 INJECTION, SOLUTION INTRAMUSCULAR at 01:09

## 2021-09-28 ENCOUNTER — LAB VISIT (OUTPATIENT)
Dept: LAB | Facility: HOSPITAL | Age: 61
End: 2021-09-28
Attending: PHYSICIAN ASSISTANT
Payer: COMMERCIAL

## 2021-09-28 ENCOUNTER — PATIENT MESSAGE (OUTPATIENT)
Dept: OBSTETRICS AND GYNECOLOGY | Facility: CLINIC | Age: 61
End: 2021-09-28

## 2021-09-28 ENCOUNTER — TELEPHONE (OUTPATIENT)
Dept: OBSTETRICS AND GYNECOLOGY | Facility: CLINIC | Age: 61
End: 2021-09-28

## 2021-09-28 DIAGNOSIS — R39.9 UTI SYMPTOMS: ICD-10-CM

## 2021-09-28 DIAGNOSIS — R39.9 UTI SYMPTOMS: Primary | ICD-10-CM

## 2021-09-28 LAB
BACTERIA #/AREA URNS AUTO: ABNORMAL /HPF
BILIRUB UR QL STRIP: NEGATIVE
CLARITY UR REFRACT.AUTO: ABNORMAL
COLOR UR AUTO: ABNORMAL
GLUCOSE UR QL STRIP: NEGATIVE
HGB UR QL STRIP: ABNORMAL
KETONES UR QL STRIP: NEGATIVE
LEUKOCYTE ESTERASE UR QL STRIP: ABNORMAL
MICROSCOPIC COMMENT: ABNORMAL
NITRITE UR QL STRIP: NEGATIVE
PH UR STRIP: 7 [PH] (ref 5–8)
PROT UR QL STRIP: NEGATIVE
RBC #/AREA URNS AUTO: 16 /HPF (ref 0–4)
SP GR UR STRIP: 1 (ref 1–1.03)
SQUAMOUS #/AREA URNS AUTO: 2 /HPF
URN SPEC COLLECT METH UR: ABNORMAL
WBC #/AREA URNS AUTO: 90 /HPF (ref 0–5)

## 2021-09-28 PROCEDURE — 87077 CULTURE AEROBIC IDENTIFY: CPT | Performed by: PHYSICIAN ASSISTANT

## 2021-09-28 PROCEDURE — 87086 URINE CULTURE/COLONY COUNT: CPT | Performed by: PHYSICIAN ASSISTANT

## 2021-09-28 PROCEDURE — 87088 URINE BACTERIA CULTURE: CPT | Performed by: PHYSICIAN ASSISTANT

## 2021-09-28 PROCEDURE — 81001 URINALYSIS AUTO W/SCOPE: CPT | Performed by: PHYSICIAN ASSISTANT

## 2021-09-28 PROCEDURE — 87186 SC STD MICRODIL/AGAR DIL: CPT | Performed by: PHYSICIAN ASSISTANT

## 2021-09-28 RX ORDER — NITROFURANTOIN 25; 75 MG/1; MG/1
100 CAPSULE ORAL 2 TIMES DAILY
Qty: 14 CAPSULE | Refills: 0 | Status: SHIPPED | OUTPATIENT
Start: 2021-09-28 | End: 2021-10-05

## 2021-09-30 LAB — BACTERIA UR CULT: ABNORMAL

## 2021-10-08 RX ORDER — FLUCONAZOLE 150 MG/1
150 TABLET ORAL DAILY
Qty: 1 TABLET | Refills: 0 | Status: SHIPPED | OUTPATIENT
Start: 2021-10-08 | End: 2021-10-09

## 2021-10-13 ENCOUNTER — CLINICAL SUPPORT (OUTPATIENT)
Dept: OBSTETRICS AND GYNECOLOGY | Facility: CLINIC | Age: 61
End: 2021-10-13
Payer: COMMERCIAL

## 2021-10-13 DIAGNOSIS — N95.1 MENOPAUSAL SYMPTOMS: Primary | ICD-10-CM

## 2021-10-13 PROCEDURE — 96372 THER/PROPH/DIAG INJ SC/IM: CPT | Mod: S$GLB,,, | Performed by: PHYSICIAN ASSISTANT

## 2021-10-13 PROCEDURE — 96372 PR INJECTION,THERAP/PROPH/DIAG2ST, IM OR SUBCUT: ICD-10-PCS | Mod: S$GLB,,, | Performed by: PHYSICIAN ASSISTANT

## 2021-10-13 RX ADMIN — TESTOSTERONE CYPIONATE 76 MG: 200 INJECTION, SOLUTION INTRAMUSCULAR at 01:10

## 2021-10-20 ENCOUNTER — PATIENT MESSAGE (OUTPATIENT)
Dept: OBSTETRICS AND GYNECOLOGY | Facility: CLINIC | Age: 61
End: 2021-10-20
Payer: COMMERCIAL

## 2021-10-20 ENCOUNTER — PATIENT MESSAGE (OUTPATIENT)
Dept: OBSTETRICS AND GYNECOLOGY | Facility: CLINIC | Age: 61
End: 2021-10-20

## 2021-10-20 RX ORDER — FLUCONAZOLE 150 MG/1
150 TABLET ORAL DAILY
Qty: 3 TABLET | Refills: 0 | Status: SHIPPED | OUTPATIENT
Start: 2021-10-20 | End: 2021-10-23

## 2021-10-27 ENCOUNTER — PATIENT MESSAGE (OUTPATIENT)
Dept: INFECTIOUS DISEASES | Facility: CLINIC | Age: 61
End: 2021-10-27
Payer: COMMERCIAL

## 2021-11-23 ENCOUNTER — CLINICAL SUPPORT (OUTPATIENT)
Dept: OBSTETRICS AND GYNECOLOGY | Facility: CLINIC | Age: 61
End: 2021-11-23
Payer: COMMERCIAL

## 2021-11-23 DIAGNOSIS — N95.1 MENOPAUSAL SYMPTOMS: Primary | ICD-10-CM

## 2021-11-23 PROCEDURE — 96372 PR INJECTION,THERAP/PROPH/DIAG2ST, IM OR SUBCUT: ICD-10-PCS | Mod: S$GLB,,, | Performed by: PHYSICIAN ASSISTANT

## 2021-11-23 PROCEDURE — 96372 THER/PROPH/DIAG INJ SC/IM: CPT | Mod: S$GLB,,, | Performed by: PHYSICIAN ASSISTANT

## 2021-11-23 RX ADMIN — TESTOSTERONE CYPIONATE 76 MG: 200 INJECTION, SOLUTION INTRAMUSCULAR at 03:11

## 2021-11-26 ENCOUNTER — OFFICE VISIT (OUTPATIENT)
Dept: OBSTETRICS AND GYNECOLOGY | Facility: CLINIC | Age: 61
End: 2021-11-26
Payer: COMMERCIAL

## 2021-11-26 ENCOUNTER — IMMUNIZATION (OUTPATIENT)
Dept: PRIMARY CARE CLINIC | Facility: CLINIC | Age: 61
End: 2021-11-26
Payer: COMMERCIAL

## 2021-11-26 VITALS
BODY MASS INDEX: 26.28 KG/M2 | DIASTOLIC BLOOD PRESSURE: 81 MMHG | SYSTOLIC BLOOD PRESSURE: 116 MMHG | WEIGHT: 142.81 LBS | HEIGHT: 62 IN

## 2021-11-26 DIAGNOSIS — N95.1 MENOPAUSAL SYMPTOMS: ICD-10-CM

## 2021-11-26 DIAGNOSIS — Z23 NEED FOR VACCINATION: Primary | ICD-10-CM

## 2021-11-26 DIAGNOSIS — Z12.31 SCREENING MAMMOGRAM, ENCOUNTER FOR: ICD-10-CM

## 2021-11-26 DIAGNOSIS — N95.2 VAGINAL ATROPHY: ICD-10-CM

## 2021-11-26 DIAGNOSIS — B37.9 YEAST INFECTION: Primary | ICD-10-CM

## 2021-11-26 DIAGNOSIS — N89.8 VAGINAL IRRITATION: ICD-10-CM

## 2021-11-26 DIAGNOSIS — E89.0 POSTABLATIVE HYPOTHYROIDISM: ICD-10-CM

## 2021-11-26 PROCEDURE — 99213 PR OFFICE/OUTPT VISIT, EST, LEVL III, 20-29 MIN: ICD-10-PCS | Mod: S$GLB,,, | Performed by: PHYSICIAN ASSISTANT

## 2021-11-26 PROCEDURE — 0004A COVID-19, MRNA, LNP-S, PF, 30 MCG/0.3 ML DOSE VACCINE: CPT | Mod: CV19,PBBFAC | Performed by: INTERNAL MEDICINE

## 2021-11-26 PROCEDURE — 87481 CANDIDA DNA AMP PROBE: CPT | Mod: 59 | Performed by: PHYSICIAN ASSISTANT

## 2021-11-26 PROCEDURE — 99213 OFFICE O/P EST LOW 20 MIN: CPT | Mod: S$GLB,,, | Performed by: PHYSICIAN ASSISTANT

## 2021-11-26 RX ORDER — ESTRADIOL 10 UG/1
10 INSERT VAGINAL
Qty: 8 EACH | Refills: 11 | Status: SHIPPED | OUTPATIENT
Start: 2021-11-29 | End: 2023-02-24

## 2021-11-26 RX ORDER — ESTRADIOL 10 UG/1
10 INSERT VAGINAL NIGHTLY
Qty: 30 EACH | Refills: 0 | Status: SHIPPED | OUTPATIENT
Start: 2021-11-26 | End: 2023-02-24

## 2021-12-03 ENCOUNTER — OFFICE VISIT (OUTPATIENT)
Dept: ENDOCRINOLOGY | Facility: CLINIC | Age: 61
End: 2021-12-03
Payer: COMMERCIAL

## 2021-12-03 DIAGNOSIS — E89.0 POSTABLATIVE HYPOTHYROIDISM: Primary | ICD-10-CM

## 2021-12-03 DIAGNOSIS — E55.9 VITAMIN D DEFICIENCY: ICD-10-CM

## 2021-12-03 DIAGNOSIS — M85.80 OSTEOPENIA, UNSPECIFIED LOCATION: ICD-10-CM

## 2021-12-03 PROCEDURE — 99214 PR OFFICE/OUTPT VISIT, EST, LEVL IV, 30-39 MIN: ICD-10-PCS | Mod: 95,,, | Performed by: INTERNAL MEDICINE

## 2021-12-03 PROCEDURE — 99214 OFFICE O/P EST MOD 30 MIN: CPT | Mod: 95,,, | Performed by: INTERNAL MEDICINE

## 2021-12-03 RX ORDER — THYROID 90 MG/1
TABLET ORAL
Qty: 90 TABLET | Refills: 3 | Status: SHIPPED | OUTPATIENT
Start: 2021-12-03 | End: 2021-12-27 | Stop reason: SDUPTHER

## 2021-12-09 ENCOUNTER — PATIENT MESSAGE (OUTPATIENT)
Dept: OBSTETRICS AND GYNECOLOGY | Facility: CLINIC | Age: 61
End: 2021-12-09
Payer: COMMERCIAL

## 2021-12-09 DIAGNOSIS — N95.1 MENOPAUSAL SYMPTOM: ICD-10-CM

## 2021-12-09 RX ORDER — PROGESTERONE 200 MG/1
CAPSULE ORAL
Qty: 30 CAPSULE | Refills: 6 | Status: SHIPPED | OUTPATIENT
Start: 2021-12-09 | End: 2022-08-03

## 2021-12-14 ENCOUNTER — LAB VISIT (OUTPATIENT)
Dept: LAB | Facility: HOSPITAL | Age: 61
End: 2021-12-14
Attending: PHYSICIAN ASSISTANT
Payer: COMMERCIAL

## 2021-12-14 DIAGNOSIS — N95.1 MENOPAUSAL SYMPTOMS: ICD-10-CM

## 2021-12-14 DIAGNOSIS — E89.0 POSTABLATIVE HYPOTHYROIDISM: ICD-10-CM

## 2021-12-14 LAB
ESTRADIOL SERPL-MCNC: 27 PG/ML
T3 SERPL-MCNC: 91 NG/DL (ref 60–180)
T4 FREE SERPL-MCNC: 0.57 NG/DL (ref 0.71–1.51)
TSH SERPL DL<=0.005 MIU/L-ACNC: 8.48 UIU/ML (ref 0.4–4)

## 2021-12-14 PROCEDURE — 84480 ASSAY TRIIODOTHYRONINE (T3): CPT | Performed by: PHYSICIAN ASSISTANT

## 2021-12-14 PROCEDURE — 84443 ASSAY THYROID STIM HORMONE: CPT | Performed by: PHYSICIAN ASSISTANT

## 2021-12-14 PROCEDURE — 84402 ASSAY OF FREE TESTOSTERONE: CPT | Performed by: PHYSICIAN ASSISTANT

## 2021-12-14 PROCEDURE — 84439 ASSAY OF FREE THYROXINE: CPT | Performed by: PHYSICIAN ASSISTANT

## 2021-12-14 PROCEDURE — 82670 ASSAY OF TOTAL ESTRADIOL: CPT | Performed by: PHYSICIAN ASSISTANT

## 2021-12-14 PROCEDURE — 36415 COLL VENOUS BLD VENIPUNCTURE: CPT | Mod: PO | Performed by: PHYSICIAN ASSISTANT

## 2021-12-17 ENCOUNTER — PATIENT MESSAGE (OUTPATIENT)
Dept: ENDOCRINOLOGY | Facility: CLINIC | Age: 61
End: 2021-12-17
Payer: COMMERCIAL

## 2021-12-17 DIAGNOSIS — E89.0 POSTABLATIVE HYPOTHYROIDISM: Primary | ICD-10-CM

## 2021-12-17 LAB — TESTOST FREE SERPL-MCNC: 0.8 PG/ML

## 2021-12-21 ENCOUNTER — CLINICAL SUPPORT (OUTPATIENT)
Dept: OBSTETRICS AND GYNECOLOGY | Facility: CLINIC | Age: 61
End: 2021-12-21
Payer: COMMERCIAL

## 2021-12-21 DIAGNOSIS — N95.1 MENOPAUSAL SYMPTOMS: Primary | ICD-10-CM

## 2021-12-21 PROCEDURE — 96372 THER/PROPH/DIAG INJ SC/IM: CPT | Mod: S$GLB,,, | Performed by: OBSTETRICS & GYNECOLOGY

## 2021-12-21 PROCEDURE — 96372 PR INJECTION,THERAP/PROPH/DIAG2ST, IM OR SUBCUT: ICD-10-PCS | Mod: S$GLB,,, | Performed by: OBSTETRICS & GYNECOLOGY

## 2021-12-21 RX ORDER — TESTOSTERONE CYPIONATE 200 MG/ML
76 INJECTION, SOLUTION INTRAMUSCULAR
Status: DISCONTINUED | OUTPATIENT
Start: 2021-12-21 | End: 2022-02-15

## 2021-12-21 RX ADMIN — TESTOSTERONE CYPIONATE 76 MG: 200 INJECTION, SOLUTION INTRAMUSCULAR at 01:12

## 2021-12-27 ENCOUNTER — PATIENT MESSAGE (OUTPATIENT)
Dept: ENDOCRINOLOGY | Facility: CLINIC | Age: 61
End: 2021-12-27
Payer: COMMERCIAL

## 2021-12-27 DIAGNOSIS — E89.0 POSTABLATIVE HYPOTHYROIDISM: Primary | ICD-10-CM

## 2021-12-27 RX ORDER — THYROID 90 MG/1
90 TABLET ORAL
Qty: 90 TABLET | Refills: 3 | Status: SHIPPED | OUTPATIENT
Start: 2021-12-27 | End: 2022-01-03 | Stop reason: SDUPTHER

## 2022-01-03 RX ORDER — THYROID 90 MG/1
90 TABLET ORAL
Qty: 90 TABLET | Refills: 3 | Status: SHIPPED | OUTPATIENT
Start: 2022-01-03 | End: 2023-02-24 | Stop reason: SDUPTHER

## 2022-01-04 ENCOUNTER — HOSPITAL ENCOUNTER (OUTPATIENT)
Dept: RADIOLOGY | Facility: CLINIC | Age: 62
Discharge: HOME | End: 2022-01-04
Attending: INTERNAL MEDICINE
Payer: COMMERCIAL

## 2022-01-04 DIAGNOSIS — M85.80 OSTEOPENIA, UNSPECIFIED LOCATION: ICD-10-CM

## 2022-01-04 PROCEDURE — 77080 DEXA BONE DENSITY SPINE HIP: ICD-10-PCS | Mod: 26,,, | Performed by: INTERNAL MEDICINE

## 2022-01-04 PROCEDURE — 77080 DXA BONE DENSITY AXIAL: CPT | Mod: 26,,, | Performed by: INTERNAL MEDICINE

## 2022-01-04 PROCEDURE — 77080 DXA BONE DENSITY AXIAL: CPT | Mod: TC

## 2022-01-14 ENCOUNTER — PATIENT MESSAGE (OUTPATIENT)
Dept: ENDOCRINOLOGY | Facility: CLINIC | Age: 62
End: 2022-01-14
Payer: COMMERCIAL

## 2022-01-17 ENCOUNTER — PATIENT MESSAGE (OUTPATIENT)
Dept: ENDOCRINOLOGY | Facility: CLINIC | Age: 62
End: 2022-01-17
Payer: COMMERCIAL

## 2022-01-18 ENCOUNTER — CLINICAL SUPPORT (OUTPATIENT)
Dept: OBSTETRICS AND GYNECOLOGY | Facility: CLINIC | Age: 62
End: 2022-01-18
Payer: COMMERCIAL

## 2022-01-18 DIAGNOSIS — N95.1 MENOPAUSAL SYMPTOMS: Primary | ICD-10-CM

## 2022-01-18 PROCEDURE — 96372 THER/PROPH/DIAG INJ SC/IM: CPT | Mod: S$GLB,,, | Performed by: OBSTETRICS & GYNECOLOGY

## 2022-01-18 PROCEDURE — 99999 PR PBB SHADOW E&M-EST. PATIENT-LVL III: ICD-10-PCS | Mod: PBBFAC,,,

## 2022-01-18 PROCEDURE — 99999 PR PBB SHADOW E&M-EST. PATIENT-LVL III: CPT | Mod: PBBFAC,,,

## 2022-01-18 PROCEDURE — 96372 PR INJECTION,THERAP/PROPH/DIAG2ST, IM OR SUBCUT: ICD-10-PCS | Mod: S$GLB,,, | Performed by: OBSTETRICS & GYNECOLOGY

## 2022-01-18 RX ADMIN — TESTOSTERONE CYPIONATE 76 MG: 200 INJECTION, SOLUTION INTRAMUSCULAR at 11:01

## 2022-02-01 ENCOUNTER — HOSPITAL ENCOUNTER (OUTPATIENT)
Dept: RADIOLOGY | Facility: OTHER | Age: 62
Discharge: HOME | End: 2022-02-01
Attending: PHYSICIAN ASSISTANT
Payer: COMMERCIAL

## 2022-02-01 DIAGNOSIS — Z12.31 SCREENING MAMMOGRAM, ENCOUNTER FOR: ICD-10-CM

## 2022-02-01 PROCEDURE — 77067 MAMMO DIGITAL SCREENING BILAT WITH TOMO: ICD-10-PCS | Mod: 26,,, | Performed by: RADIOLOGY

## 2022-02-01 PROCEDURE — 77063 BREAST TOMOSYNTHESIS BI: CPT | Mod: 26,,, | Performed by: RADIOLOGY

## 2022-02-01 PROCEDURE — 77063 MAMMO DIGITAL SCREENING BILAT WITH TOMO: ICD-10-PCS | Mod: 26,,, | Performed by: RADIOLOGY

## 2022-02-01 PROCEDURE — 77063 BREAST TOMOSYNTHESIS BI: CPT | Mod: TC

## 2022-02-01 PROCEDURE — 77067 SCR MAMMO BI INCL CAD: CPT | Mod: 26,,, | Performed by: RADIOLOGY

## 2022-02-15 ENCOUNTER — CLINICAL SUPPORT (OUTPATIENT)
Dept: OBSTETRICS AND GYNECOLOGY | Facility: CLINIC | Age: 62
End: 2022-02-15
Payer: COMMERCIAL

## 2022-02-15 DIAGNOSIS — N95.1 MENOPAUSAL SYMPTOMS: Primary | ICD-10-CM

## 2022-02-15 DIAGNOSIS — N95.1 MENOPAUSAL SYMPTOMS: ICD-10-CM

## 2022-02-15 PROCEDURE — 96372 THER/PROPH/DIAG INJ SC/IM: CPT | Mod: S$GLB,,, | Performed by: OBSTETRICS & GYNECOLOGY

## 2022-02-15 PROCEDURE — 96372 PR INJECTION,THERAP/PROPH/DIAG2ST, IM OR SUBCUT: ICD-10-PCS | Mod: S$GLB,,, | Performed by: OBSTETRICS & GYNECOLOGY

## 2022-02-15 RX ORDER — TESTOSTERONE CYPIONATE 200 MG/ML
76 INJECTION, SOLUTION INTRAMUSCULAR
Status: COMPLETED | OUTPATIENT
Start: 2022-03-15 | End: 2022-08-08

## 2022-02-15 RX ADMIN — TESTOSTERONE CYPIONATE 76 MG: 200 INJECTION, SOLUTION INTRAMUSCULAR at 02:02

## 2022-02-15 NOTE — PROGRESS NOTES
Here for hormone therapy injection, no complaints at this time, Injection given as ordered, tolerated well, no report of pain prior to or after injection. Return to clinic as scheduled.     Site - RB    Testosterone  76 mg  Depo Estradiol  5 mg    Clinic Supplied Medication

## 2022-02-21 ENCOUNTER — LAB VISIT (OUTPATIENT)
Dept: LAB | Facility: HOSPITAL | Age: 62
End: 2022-02-21
Attending: INTERNAL MEDICINE
Payer: COMMERCIAL

## 2022-02-21 DIAGNOSIS — E89.0 POSTABLATIVE HYPOTHYROIDISM: ICD-10-CM

## 2022-02-21 LAB — TSH SERPL DL<=0.005 MIU/L-ACNC: 3.68 UIU/ML (ref 0.4–4)

## 2022-02-21 PROCEDURE — 84443 ASSAY THYROID STIM HORMONE: CPT | Performed by: INTERNAL MEDICINE

## 2022-02-21 PROCEDURE — 36415 COLL VENOUS BLD VENIPUNCTURE: CPT | Mod: PO | Performed by: INTERNAL MEDICINE

## 2022-02-23 DIAGNOSIS — D84.9 IMMUNOSUPPRESSED STATUS: ICD-10-CM

## 2022-03-14 ENCOUNTER — CLINICAL SUPPORT (OUTPATIENT)
Dept: OBSTETRICS AND GYNECOLOGY | Facility: CLINIC | Age: 62
End: 2022-03-14
Payer: COMMERCIAL

## 2022-03-14 PROCEDURE — 96372 THER/PROPH/DIAG INJ SC/IM: CPT | Mod: S$GLB,,, | Performed by: PHYSICIAN ASSISTANT

## 2022-03-14 PROCEDURE — 96372 PR INJECTION,THERAP/PROPH/DIAG2ST, IM OR SUBCUT: ICD-10-PCS | Mod: S$GLB,,, | Performed by: PHYSICIAN ASSISTANT

## 2022-03-14 RX ADMIN — TESTOSTERONE CYPIONATE 76 MG: 200 INJECTION, SOLUTION INTRAMUSCULAR at 03:03

## 2022-04-13 ENCOUNTER — CLINICAL SUPPORT (OUTPATIENT)
Dept: OBSTETRICS AND GYNECOLOGY | Facility: CLINIC | Age: 62
End: 2022-04-13
Payer: COMMERCIAL

## 2022-04-13 DIAGNOSIS — N95.1 MENOPAUSAL SYMPTOMS: Primary | ICD-10-CM

## 2022-04-13 PROCEDURE — 99999 PR PBB SHADOW E&M-EST. PATIENT-LVL I: ICD-10-PCS | Mod: PBBFAC,,,

## 2022-04-13 PROCEDURE — 96372 THER/PROPH/DIAG INJ SC/IM: CPT | Mod: S$GLB,,, | Performed by: PHYSICIAN ASSISTANT

## 2022-04-13 PROCEDURE — 96372 PR INJECTION,THERAP/PROPH/DIAG2ST, IM OR SUBCUT: ICD-10-PCS | Mod: S$GLB,,, | Performed by: PHYSICIAN ASSISTANT

## 2022-04-13 PROCEDURE — 99999 PR PBB SHADOW E&M-EST. PATIENT-LVL I: CPT | Mod: PBBFAC,,,

## 2022-04-13 RX ADMIN — TESTOSTERONE CYPIONATE 76 MG: 200 INJECTION, SOLUTION INTRAMUSCULAR at 01:04

## 2022-05-03 ENCOUNTER — PATIENT MESSAGE (OUTPATIENT)
Dept: RESEARCH | Facility: HOSPITAL | Age: 62
End: 2022-05-03
Payer: COMMERCIAL

## 2022-05-10 ENCOUNTER — CLINICAL SUPPORT (OUTPATIENT)
Dept: OBSTETRICS AND GYNECOLOGY | Facility: CLINIC | Age: 62
End: 2022-05-10
Payer: COMMERCIAL

## 2022-05-10 DIAGNOSIS — Z79.890 HORMONE REPLACEMENT THERAPY (HRT): Primary | ICD-10-CM

## 2022-05-10 PROCEDURE — 96372 PR INJECTION,THERAP/PROPH/DIAG2ST, IM OR SUBCUT: ICD-10-PCS | Mod: S$GLB,,, | Performed by: PHYSICIAN ASSISTANT

## 2022-05-10 PROCEDURE — 99999 PR PBB SHADOW E&M-EST. PATIENT-LVL I: ICD-10-PCS | Mod: PBBFAC,,,

## 2022-05-10 PROCEDURE — 96372 THER/PROPH/DIAG INJ SC/IM: CPT | Mod: S$GLB,,, | Performed by: PHYSICIAN ASSISTANT

## 2022-05-10 PROCEDURE — 99999 PR PBB SHADOW E&M-EST. PATIENT-LVL I: CPT | Mod: PBBFAC,,,

## 2022-05-10 RX ADMIN — TESTOSTERONE CYPIONATE 76 MG: 200 INJECTION, SOLUTION INTRAMUSCULAR at 01:05

## 2022-05-10 NOTE — PROGRESS NOTES
During visit today patient received an injection of depo estradiol 5mg and testosterone  76 mg  To the LEFT  ventrogluteal.  Tolerated well.  Instructed pt to remain 15 minutes after injection to monitor for reactions.       Pre pain scale: none     Post pain scale: none

## 2022-06-07 ENCOUNTER — CLINICAL SUPPORT (OUTPATIENT)
Dept: OBSTETRICS AND GYNECOLOGY | Facility: CLINIC | Age: 62
End: 2022-06-07
Payer: COMMERCIAL

## 2022-06-07 DIAGNOSIS — Z79.890 HORMONE REPLACEMENT THERAPY (HRT): Primary | ICD-10-CM

## 2022-06-07 PROCEDURE — 99999 PR PBB SHADOW E&M-EST. PATIENT-LVL II: CPT | Mod: PBBFAC,,,

## 2022-06-07 PROCEDURE — 96372 PR INJECTION,THERAP/PROPH/DIAG2ST, IM OR SUBCUT: ICD-10-PCS | Mod: S$GLB,,, | Performed by: PHYSICIAN ASSISTANT

## 2022-06-07 PROCEDURE — 99999 PR PBB SHADOW E&M-EST. PATIENT-LVL II: ICD-10-PCS | Mod: PBBFAC,,,

## 2022-06-07 PROCEDURE — 96372 THER/PROPH/DIAG INJ SC/IM: CPT | Mod: S$GLB,,, | Performed by: PHYSICIAN ASSISTANT

## 2022-06-07 RX ADMIN — TESTOSTERONE CYPIONATE 76 MG: 200 INJECTION, SOLUTION INTRAMUSCULAR at 01:06

## 2022-07-08 ENCOUNTER — CLINICAL SUPPORT (OUTPATIENT)
Dept: OBSTETRICS AND GYNECOLOGY | Facility: CLINIC | Age: 62
End: 2022-07-08
Payer: COMMERCIAL

## 2022-07-08 DIAGNOSIS — N95.1 MENOPAUSAL SYMPTOMS: Primary | ICD-10-CM

## 2022-07-08 PROCEDURE — 96372 THER/PROPH/DIAG INJ SC/IM: CPT | Mod: S$GLB,,, | Performed by: OBSTETRICS & GYNECOLOGY

## 2022-07-08 PROCEDURE — 96372 PR INJECTION,THERAP/PROPH/DIAG2ST, IM OR SUBCUT: ICD-10-PCS | Mod: S$GLB,,, | Performed by: PHYSICIAN ASSISTANT

## 2022-07-08 PROCEDURE — 96372 PR INJECTION,THERAP/PROPH/DIAG2ST, IM OR SUBCUT: ICD-10-PCS | Mod: S$GLB,,, | Performed by: OBSTETRICS & GYNECOLOGY

## 2022-07-08 PROCEDURE — 99999 PR PBB SHADOW E&M-EST. PATIENT-LVL I: ICD-10-PCS | Mod: PBBFAC,,,

## 2022-07-08 PROCEDURE — 99999 PR PBB SHADOW E&M-EST. PATIENT-LVL I: CPT | Mod: PBBFAC,,,

## 2022-07-08 PROCEDURE — 96372 THER/PROPH/DIAG INJ SC/IM: CPT | Mod: S$GLB,,, | Performed by: PHYSICIAN ASSISTANT

## 2022-07-08 RX ORDER — ESTRADIOL VALERATE 40 MG/ML
20 INJECTION INTRAMUSCULAR
Status: COMPLETED | OUTPATIENT
Start: 2022-07-08 | End: 2022-08-31

## 2022-07-08 RX ADMIN — ESTRADIOL VALERATE 20 MG: 40 INJECTION INTRAMUSCULAR at 08:07

## 2022-07-08 RX ADMIN — TESTOSTERONE CYPIONATE 76 MG: 200 INJECTION, SOLUTION INTRAMUSCULAR at 08:07

## 2022-07-08 NOTE — PROGRESS NOTES
Here for hormone therapy injection, no complaints at this time, Injection given as ordered, tolerated well, no report of pain prior to or after injection. Return to clinic as scheduled.     Site - LB    Testosterone 76 mg  Delestrogen 20 mg    Clinic Supplied Medication

## 2022-07-15 ENCOUNTER — IMMUNIZATION (OUTPATIENT)
Dept: PRIMARY CARE CLINIC | Facility: CLINIC | Age: 62
End: 2022-07-15
Payer: COMMERCIAL

## 2022-07-15 DIAGNOSIS — Z23 NEED FOR VACCINATION: Primary | ICD-10-CM

## 2022-07-15 PROCEDURE — 0054A COVID-19, MRNA, LNP-S, PF, 30 MCG/0.3 ML DOSE VACCINE (PFIZER): CPT | Mod: CV19,PBBFAC | Performed by: INTERNAL MEDICINE

## 2022-08-03 DIAGNOSIS — N95.1 MENOPAUSAL SYMPTOMS: Primary | ICD-10-CM

## 2022-08-08 ENCOUNTER — CLINICAL SUPPORT (OUTPATIENT)
Dept: OBSTETRICS AND GYNECOLOGY | Facility: CLINIC | Age: 62
End: 2022-08-08
Payer: COMMERCIAL

## 2022-08-08 DIAGNOSIS — N95.1 MENOPAUSAL SYMPTOMS: Primary | ICD-10-CM

## 2022-08-08 PROCEDURE — 96372 PR INJECTION,THERAP/PROPH/DIAG2ST, IM OR SUBCUT: ICD-10-PCS | Mod: S$GLB,,, | Performed by: PHYSICIAN ASSISTANT

## 2022-08-08 PROCEDURE — 96372 THER/PROPH/DIAG INJ SC/IM: CPT | Mod: S$GLB,,, | Performed by: OBSTETRICS & GYNECOLOGY

## 2022-08-08 PROCEDURE — 96372 THER/PROPH/DIAG INJ SC/IM: CPT | Mod: S$GLB,,, | Performed by: PHYSICIAN ASSISTANT

## 2022-08-08 PROCEDURE — 96372 PR INJECTION,THERAP/PROPH/DIAG2ST, IM OR SUBCUT: ICD-10-PCS | Mod: S$GLB,,, | Performed by: OBSTETRICS & GYNECOLOGY

## 2022-08-08 RX ADMIN — TESTOSTERONE CYPIONATE 76 MG: 200 INJECTION, SOLUTION INTRAMUSCULAR at 09:08

## 2022-08-08 RX ADMIN — ESTRADIOL VALERATE 20 MG: 40 INJECTION INTRAMUSCULAR at 09:08

## 2022-08-08 NOTE — PROGRESS NOTES
Patient arrives today for her monthly hormone injection. Risks, benefits, side effects, administration, frequency and reasons warranting provider notification reviewed with patient. Patient verbalizes understanding.     76 mg Testosterone and 20 mg Estradiol administered IM to right outer quadrant of gluteus using aseptic technique and 22 gauge 1.5 inch needle.     Site secured with Band-Aid, needle tip remains intact, patient tolerated well without pain. Patient stated that she experienced breast tenderness. Reminder set for next appt.     Patient's next injection scheduled prior to clinic departure.      Metric Last Due   Estradiol 12/21 After dose 3   Free Testosterone 12/21 After dose 3   Progesterone (if applicable)     Well Woman Exam 2/21    Annual w/PCP Sched    MMG 02/22 2/23       Recent/Upcoming Surgery or Admissions (last 30 days):  No   Recent Health Changes (last 30 days): No   Allergy Changes: No   Medication Changes: No   Family History Updates (message Valentine for any family cancer updates):  No   Applicable Clearances Obtained: Yes   BP (if checked) under 180/100:  N/A

## 2022-08-30 NOTE — PROGRESS NOTES
Patient arrives today for her monthly hormone injection. Risks, benefits, side effects, administration, frequency and reasons warranting provider notification reviewed with patient. Patient verbalizes understanding.     76 mg Testosterone and 20 mg Estradiol administered IM to left outer quadrant of gluteus using aseptic technique and 22 gauge 1.5 inch needle.     Site secured with Band-Aid, needle tip remains intact, patient tolerated well without pain.      Patient's next injection scheduled prior to clinic departure.     Dose 2 of 3     Metric Last Due   Estradiol 12/21 After dose 3   Free Testosterone 12/21 After dose 3   Progesterone (if applicable)       Well Woman Exam 2/21     Annual w/PCP Sched     MMG 02/22 2/23         Recent/Upcoming Surgery or Admissions (last 30 days):  No   Recent Health Changes (last 30 days): No   Allergy Changes: No   Medication Changes: No   Family History Updates (message Valentine for any family cancer updates):  No   Applicable Clearances Obtained: Yes   BP (if checked) under 180/100:  N/A

## 2022-08-31 ENCOUNTER — CLINICAL SUPPORT (OUTPATIENT)
Dept: OBSTETRICS AND GYNECOLOGY | Facility: CLINIC | Age: 62
End: 2022-08-31
Payer: COMMERCIAL

## 2022-08-31 DIAGNOSIS — N95.1 MENOPAUSAL SYMPTOMS: Primary | ICD-10-CM

## 2022-08-31 PROCEDURE — 99999 PR PBB SHADOW E&M-EST. PATIENT-LVL I: CPT | Mod: PBBFAC,,,

## 2022-08-31 PROCEDURE — 96372 PR INJECTION,THERAP/PROPH/DIAG2ST, IM OR SUBCUT: ICD-10-PCS | Mod: ,,, | Performed by: OBSTETRICS & GYNECOLOGY

## 2022-08-31 PROCEDURE — 96372 THER/PROPH/DIAG INJ SC/IM: CPT | Mod: S$GLB,,, | Performed by: OBSTETRICS & GYNECOLOGY

## 2022-08-31 PROCEDURE — 99999 PR PBB SHADOW E&M-EST. PATIENT-LVL I: ICD-10-PCS | Mod: PBBFAC,,,

## 2022-08-31 PROCEDURE — 96372 PR INJECTION,THERAP/PROPH/DIAG2ST, IM OR SUBCUT: ICD-10-PCS | Mod: S$GLB,,, | Performed by: OBSTETRICS & GYNECOLOGY

## 2022-08-31 PROCEDURE — 96372 THER/PROPH/DIAG INJ SC/IM: CPT | Mod: ,,, | Performed by: OBSTETRICS & GYNECOLOGY

## 2022-08-31 RX ORDER — TESTOSTERONE CYPIONATE 200 MG/ML
76 INJECTION, SOLUTION INTRAMUSCULAR
Status: COMPLETED | OUTPATIENT
Start: 2022-08-31 | End: 2023-02-13

## 2022-08-31 RX ADMIN — TESTOSTERONE CYPIONATE 76 MG: 200 INJECTION, SOLUTION INTRAMUSCULAR at 09:08

## 2022-08-31 RX ADMIN — ESTRADIOL VALERATE 20 MG: 40 INJECTION INTRAMUSCULAR at 09:08

## 2022-09-06 ENCOUNTER — PATIENT MESSAGE (OUTPATIENT)
Dept: OBSTETRICS AND GYNECOLOGY | Facility: CLINIC | Age: 62
End: 2022-09-06
Payer: COMMERCIAL

## 2022-09-07 ENCOUNTER — TELEPHONE (OUTPATIENT)
Dept: OBSTETRICS AND GYNECOLOGY | Facility: CLINIC | Age: 62
End: 2022-09-07
Payer: COMMERCIAL

## 2022-09-29 NOTE — PROGRESS NOTES
Patient arrives today for her monthly hormone injection. Risks, benefits, side effects, administration, frequency and reasons warranting provider notification reviewed with patient. Patient verbalizes understanding.     76 mg Testosterone and 20 mg Estradiol administered IM to left outer quadrant of gluteus using aseptic technique and 22 gauge 1.5 inch needle.     Site secured with Band-Aid, needle tip remains intact, patient tolerated well without pain.      Patient's next injection scheduled prior to clinic departure.     Dose 3 of 6      Metric Last Due   Estradiol 12/21 After dose 3   Free Testosterone 12/21 After dose 3   Progesterone (if applicable)       Well Woman Exam   DUE   Annual w/PCP   DUE   MMG 02/22 2/23         Recent/Upcoming Surgery or Admissions (last 30 days):  No   Recent Health Changes (last 30 days): No   Allergy Changes: No   Medication Changes: No   Family History Updates (message Valentine for any family cancer updates):  No   Applicable Clearances Obtained: Yes   BP (if checked) under 180/100:  N/A

## 2022-09-30 ENCOUNTER — CLINICAL SUPPORT (OUTPATIENT)
Dept: OBSTETRICS AND GYNECOLOGY | Facility: CLINIC | Age: 62
End: 2022-09-30
Payer: COMMERCIAL

## 2022-09-30 DIAGNOSIS — N95.1 MENOPAUSAL SYMPTOMS: Primary | ICD-10-CM

## 2022-09-30 PROCEDURE — 99999 PR PBB SHADOW E&M-EST. PATIENT-LVL I: CPT | Mod: PBBFAC,,,

## 2022-09-30 PROCEDURE — 96372 THER/PROPH/DIAG INJ SC/IM: CPT | Mod: S$GLB,,, | Performed by: OBSTETRICS & GYNECOLOGY

## 2022-09-30 PROCEDURE — 96372 PR INJECTION,THERAP/PROPH/DIAG2ST, IM OR SUBCUT: ICD-10-PCS | Mod: S$GLB,,, | Performed by: OBSTETRICS & GYNECOLOGY

## 2022-09-30 PROCEDURE — 99999 PR PBB SHADOW E&M-EST. PATIENT-LVL I: ICD-10-PCS | Mod: PBBFAC,,,

## 2022-09-30 RX ORDER — ESTRADIOL VALERATE 40 MG/ML
20 INJECTION INTRAMUSCULAR
Status: DISCONTINUED | OUTPATIENT
Start: 2022-09-30 | End: 2023-02-13

## 2022-09-30 RX ADMIN — TESTOSTERONE CYPIONATE 76 MG: 200 INJECTION, SOLUTION INTRAMUSCULAR at 08:09

## 2022-09-30 RX ADMIN — ESTRADIOL VALERATE 20 MG: 40 INJECTION INTRAMUSCULAR at 08:09

## 2022-10-31 ENCOUNTER — CLINICAL SUPPORT (OUTPATIENT)
Dept: OBSTETRICS AND GYNECOLOGY | Facility: CLINIC | Age: 62
End: 2022-10-31
Payer: COMMERCIAL

## 2022-10-31 DIAGNOSIS — N95.1 MENOPAUSAL SYMPTOMS: Primary | ICD-10-CM

## 2022-10-31 PROCEDURE — 99999 PR PBB SHADOW E&M-EST. PATIENT-LVL II: CPT | Mod: PBBFAC,,,

## 2022-10-31 PROCEDURE — 99999 PR PBB SHADOW E&M-EST. PATIENT-LVL II: ICD-10-PCS | Mod: PBBFAC,,,

## 2022-10-31 PROCEDURE — 96372 PR INJECTION,THERAP/PROPH/DIAG2ST, IM OR SUBCUT: ICD-10-PCS | Mod: S$GLB,,, | Performed by: OBSTETRICS & GYNECOLOGY

## 2022-10-31 PROCEDURE — 96372 THER/PROPH/DIAG INJ SC/IM: CPT | Mod: S$GLB,,, | Performed by: OBSTETRICS & GYNECOLOGY

## 2022-10-31 RX ADMIN — ESTRADIOL VALERATE 20 MG: 40 INJECTION INTRAMUSCULAR at 12:10

## 2022-10-31 RX ADMIN — TESTOSTERONE CYPIONATE 76 MG: 200 INJECTION, SOLUTION INTRAMUSCULAR at 12:10

## 2022-10-31 NOTE — PROGRESS NOTES
...Ordering Provider  Dr. ETHAN Nielsen       10/31/22 Pt administered 76 mg of testosterone and Delestrogen 20mg to the right upper outer glut. Pt tolerated well. Pt instructed next injection is due on 11/28/22. Pt verbalizes understanding.       Pain Before:  None    Pain After: None    Patient Complaints: None

## 2022-11-10 ENCOUNTER — PATIENT MESSAGE (OUTPATIENT)
Dept: OBSTETRICS AND GYNECOLOGY | Facility: CLINIC | Age: 62
End: 2022-11-10
Payer: COMMERCIAL

## 2022-12-14 ENCOUNTER — CLINICAL SUPPORT (OUTPATIENT)
Dept: OBSTETRICS AND GYNECOLOGY | Facility: CLINIC | Age: 62
End: 2022-12-14
Payer: COMMERCIAL

## 2022-12-14 DIAGNOSIS — N95.1 MENOPAUSAL SYMPTOMS: Primary | ICD-10-CM

## 2022-12-14 PROCEDURE — 99999 PR PBB SHADOW E&M-EST. PATIENT-LVL I: CPT | Mod: PBBFAC,,,

## 2022-12-14 PROCEDURE — 99999 PR PBB SHADOW E&M-EST. PATIENT-LVL I: ICD-10-PCS | Mod: PBBFAC,,,

## 2022-12-14 PROCEDURE — 96372 THER/PROPH/DIAG INJ SC/IM: CPT | Mod: S$GLB,,, | Performed by: OBSTETRICS & GYNECOLOGY

## 2022-12-14 PROCEDURE — 96372 PR INJECTION,THERAP/PROPH/DIAG2ST, IM OR SUBCUT: ICD-10-PCS | Mod: S$GLB,,, | Performed by: OBSTETRICS & GYNECOLOGY

## 2022-12-14 RX ADMIN — TESTOSTERONE CYPIONATE 76 MG: 200 INJECTION, SOLUTION INTRAMUSCULAR at 08:12

## 2022-12-14 RX ADMIN — ESTRADIOL VALERATE 20 MG: 40 INJECTION INTRAMUSCULAR at 08:12

## 2023-01-17 ENCOUNTER — CLINICAL SUPPORT (OUTPATIENT)
Dept: OBSTETRICS AND GYNECOLOGY | Facility: CLINIC | Age: 63
End: 2023-01-17
Payer: COMMERCIAL

## 2023-01-17 DIAGNOSIS — N95.1 MENOPAUSAL SYMPTOMS: Primary | ICD-10-CM

## 2023-01-17 PROCEDURE — 96372 THER/PROPH/DIAG INJ SC/IM: CPT | Mod: S$GLB,,, | Performed by: OBSTETRICS & GYNECOLOGY

## 2023-01-17 PROCEDURE — 99999 PR PBB SHADOW E&M-EST. PATIENT-LVL I: ICD-10-PCS | Mod: PBBFAC,,,

## 2023-01-17 PROCEDURE — 99999 PR PBB SHADOW E&M-EST. PATIENT-LVL I: CPT | Mod: PBBFAC,,,

## 2023-01-17 PROCEDURE — 96372 PR INJECTION,THERAP/PROPH/DIAG2ST, IM OR SUBCUT: ICD-10-PCS | Mod: S$GLB,,, | Performed by: OBSTETRICS & GYNECOLOGY

## 2023-01-17 RX ADMIN — TESTOSTERONE CYPIONATE 76 MG: 200 INJECTION, SOLUTION INTRAMUSCULAR at 08:01

## 2023-01-17 RX ADMIN — ESTRADIOL VALERATE 20 MG: 40 INJECTION INTRAMUSCULAR at 08:01

## 2023-02-13 ENCOUNTER — CLINICAL SUPPORT (OUTPATIENT)
Dept: OBSTETRICS AND GYNECOLOGY | Facility: CLINIC | Age: 63
End: 2023-02-13
Payer: COMMERCIAL

## 2023-02-13 DIAGNOSIS — N95.1 MENOPAUSAL SYMPTOMS: Primary | ICD-10-CM

## 2023-02-13 PROCEDURE — 96372 PR INJECTION,THERAP/PROPH/DIAG2ST, IM OR SUBCUT: ICD-10-PCS | Mod: S$GLB,,, | Performed by: OBSTETRICS & GYNECOLOGY

## 2023-02-13 PROCEDURE — 96372 THER/PROPH/DIAG INJ SC/IM: CPT | Mod: S$GLB,,, | Performed by: OBSTETRICS & GYNECOLOGY

## 2023-02-13 PROCEDURE — 99999 PR PBB SHADOW E&M-EST. PATIENT-LVL I: CPT | Mod: PBBFAC,,,

## 2023-02-13 PROCEDURE — 99999 PR PBB SHADOW E&M-EST. PATIENT-LVL I: ICD-10-PCS | Mod: PBBFAC,,,

## 2023-02-13 RX ADMIN — TESTOSTERONE CYPIONATE 76 MG: 200 INJECTION, SOLUTION INTRAMUSCULAR at 08:02

## 2023-02-13 RX ADMIN — ESTRADIOL VALERATE 20 MG: 40 INJECTION INTRAMUSCULAR at 08:02

## 2023-02-13 NOTE — PROGRESS NOTES
Requesting to go back to depo estradiol since now available. D/c delestrogen and restart depo-estradiol 5mg IM monthly.

## 2023-02-13 NOTE — PROGRESS NOTES
Here for hormone therapy injection, no complaints at this time. Injection given as ordered, tolerated well no reports of pain prior to or post injection. Advised to return to clinic as scheduled      Site:rb    Testerone:76 mg    Delestrogen: 20mg    CLINIC SUPPLIED MEDICATION

## 2023-02-24 ENCOUNTER — OFFICE VISIT (OUTPATIENT)
Dept: ENDOCRINOLOGY | Facility: CLINIC | Age: 63
End: 2023-02-24
Payer: COMMERCIAL

## 2023-02-24 DIAGNOSIS — E55.9 VITAMIN D DEFICIENCY: ICD-10-CM

## 2023-02-24 DIAGNOSIS — M85.80 OSTEOPENIA, UNSPECIFIED LOCATION: ICD-10-CM

## 2023-02-24 DIAGNOSIS — E89.0 POSTABLATIVE HYPOTHYROIDISM: Primary | ICD-10-CM

## 2023-02-24 PROCEDURE — 1159F PR MEDICATION LIST DOCUMENTED IN MEDICAL RECORD: ICD-10-PCS | Mod: CPTII,95,, | Performed by: INTERNAL MEDICINE

## 2023-02-24 PROCEDURE — 99214 OFFICE O/P EST MOD 30 MIN: CPT | Mod: 95,,, | Performed by: INTERNAL MEDICINE

## 2023-02-24 PROCEDURE — 99214 PR OFFICE/OUTPT VISIT, EST, LEVL IV, 30-39 MIN: ICD-10-PCS | Mod: 95,,, | Performed by: INTERNAL MEDICINE

## 2023-02-24 PROCEDURE — 1160F PR REVIEW ALL MEDS BY PRESCRIBER/CLIN PHARMACIST DOCUMENTED: ICD-10-PCS | Mod: CPTII,95,, | Performed by: INTERNAL MEDICINE

## 2023-02-24 PROCEDURE — 1159F MED LIST DOCD IN RCRD: CPT | Mod: CPTII,95,, | Performed by: INTERNAL MEDICINE

## 2023-02-24 PROCEDURE — 1160F RVW MEDS BY RX/DR IN RCRD: CPT | Mod: CPTII,95,, | Performed by: INTERNAL MEDICINE

## 2023-02-24 RX ORDER — THYROID 90 MG/1
90 TABLET ORAL
Qty: 90 TABLET | Refills: 3 | Status: SHIPPED | OUTPATIENT
Start: 2023-02-24 | End: 2023-03-10 | Stop reason: SDUPTHER

## 2023-02-24 NOTE — PROGRESS NOTES
Subjective:      Patient ID: Alma Jackson is a 62 y.o. female.    Chief Complaint:  Hypothyroidism    The patient location is: Home  The chief complaint leading to consultation is: Hypothyroidism    Visit type: audiovisual    Face to Face time with patient: 12 min  25 minutes of total time spent on the encounter, which includes face to face time and non-face to face time preparing to see the patient (eg, review of tests), Obtaining and/or reviewing separately obtained history, Documenting clinical information in the electronic or other health record, Independently interpreting results (not separately reported) and communicating results to the patient/family/caregiver, or Care coordination (not separately reported).     Each patient to whom he or she provides medical services by telemedicine is:  (1) informed of the relationship between the physician and patient and the respective role of any other health care provider with respect to management of the patient; and (2) notified that he or she may decline to receive medical services by telemedicine and may withdraw from such care at any time.    History of Present Illness  Ms. Jackson presents for follow up of hypothyroidism, osteopenia and vitamin D deficiency. Last visit in 12/2021.      Has active history of hypothyroidism, vit D def, osteopenia, and menopause on HRT.      Has history of Grave's disease s/p total thyroidectomy and LICEA ablation several years ago. 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 Carreon in 2012.       In regards to hypothyroidism       Latest Reference Range & Units 02/21/22 08:29   TSH 0.400 - 4.000 uIU/mL 3.679        Currently on NP thyroid 90 mg once daily.  Has noticed more fatigue recently but also with difficulty sleeping. Has also had issues with constipation more recently and she is only able to maintain regular BM's if she takes a daily fiber supplement.   Denies  palpitations. Has mild intermittent tremor in hands.       With regards to osteopenia      current medication: none    Calcium intake?  1000 mg a day   Vit D intake?  1000 units once daily  Weight bearing exercise?  Walks a fair amount and does pilates twice per week  Recent falls? No    No recent fractures      Last bone density 1/2022:  COMPARISON:  Comparison study done on 09/16/2019.     Lumbar spine:    BMD 1.235 g/cm2 and T-score 1.7.     Total Hip:           BMD 0.863 g/cm2 and T-score -0.6.     Distal 1/3 radius: Not applicable     FINDINGS:  Lumbar spine (L1-L4):              BMD is 1.198 g/cm2, T-score is 1.4, and Z-score is 3.0.     Total hip:                                BMD is 0.842 g/cm2, T-score is -0.8, and Z-score is 0.3.     Femoral neck:                          BMD is 0.686 g/cm2, T-score is -1.5, and Z-score is 0.0.     Distal 1/3 radius:                      Not applicable     FRAX:     8.4% risk of a major osteoporotic fracture in the next 10 years.     0.8% risk of hip fracture in the next 10 years.         With regards to the vitamin d deficiency:  currently taking 1000 units of Vitamin D3 daily    Review of Systems   Constitutional:  Negative for chills and fever.   Gastrointestinal:  Negative for nausea.     Objective:   Physical Exam  Constitutional:       General: She is not in acute distress.     Appearance: Normal appearance. She is not ill-appearing.   Neurological:      Mental Status: She is alert.     BP Readings from Last 3 Encounters:   11/26/21 116/81   04/06/21 122/80   02/09/21 118/70     Wt Readings from Last 1 Encounters:   11/26/21 1121 64.8 kg (142 lb 12.8 oz)       There is no height or weight on file to calculate BMI.    Lab Review:   No results found for: HGBA1C  Lab Results   Component Value Date    CHOL 196 10/13/2020    HDL 47 10/13/2020    LDLCALC 133.0 10/13/2020    TRIG 80 10/13/2020    CHOLHDL 24.0 10/13/2020     Lab Results   Component Value Date      10/13/2020    K 3.7 10/13/2020     10/13/2020    CO2 24 10/13/2020    GLU 80 10/13/2020    BUN 10 10/13/2020    CREATININE 0.7 10/13/2020    CALCIUM 8.8 10/13/2020    PROT 7.0 10/13/2020    ALBUMIN 4.0 10/13/2020    BILITOT 0.5 10/13/2020    ALKPHOS 54 (L) 10/13/2020    AST 16 10/13/2020    ALT 15 10/13/2020    ANIONGAP 11 10/13/2020    ESTGFRAFRICA >60 10/13/2020    EGFRNONAA >60 10/13/2020    TSH 3.679 02/21/2022         Assessment and Plan     Hypothyroidism  --Biochemically euthyroid by last TSH but will repeat now  --She has noted more fatigue and constipation recently  --Will repeat TFT's now  --Goal is for a normal TSH to avoid CV and bone complications  --Continue NP thyroid 90 mg once daily pending repeat TSH    Vitamin D deficiency  --Currently on vit D3 1000 units once daily      Osteopenia  --No history of fracture  --No falls  --On adequate calcium and vitamin D supplementation  --On HRT which will protect bones  --Last bone density did not indicate a need for medical therapy   --Repeat BMD in 1 year      TSH today, follow up in one year with bone density and TSH prior to appt      Torres Sarmiento M.D. Staff Endocrinology

## 2023-02-24 NOTE — ASSESSMENT & PLAN NOTE
--No history of fracture  --No falls  --On adequate calcium and vitamin D supplementation  --On HRT which will protect bones  --Last bone density did not indicate a need for medical therapy   --Repeat BMD in 1 year

## 2023-02-24 NOTE — ASSESSMENT & PLAN NOTE
--Biochemically euthyroid by last TSH but will repeat now  --She has noted more fatigue and constipation recently  --Will repeat TFT's now  --Goal is for a normal TSH to avoid CV and bone complications  --Continue NP thyroid 90 mg once daily pending repeat TSH

## 2023-03-09 ENCOUNTER — LAB VISIT (OUTPATIENT)
Dept: LAB | Facility: OTHER | Age: 63
End: 2023-03-09
Attending: INTERNAL MEDICINE
Payer: COMMERCIAL

## 2023-03-09 ENCOUNTER — OFFICE VISIT (OUTPATIENT)
Dept: OBSTETRICS AND GYNECOLOGY | Facility: CLINIC | Age: 63
End: 2023-03-09
Attending: OBSTETRICS & GYNECOLOGY
Payer: COMMERCIAL

## 2023-03-09 VITALS
HEIGHT: 62 IN | WEIGHT: 148.19 LBS | SYSTOLIC BLOOD PRESSURE: 127 MMHG | DIASTOLIC BLOOD PRESSURE: 87 MMHG | BODY MASS INDEX: 27.27 KG/M2

## 2023-03-09 DIAGNOSIS — Z79.890 POSTMENOPAUSAL HORMONE REPLACEMENT THERAPY: ICD-10-CM

## 2023-03-09 DIAGNOSIS — Z86.010 ENCOUNTER FOR COLONOSCOPY FOLLOWING COLON POLYP REMOVAL: ICD-10-CM

## 2023-03-09 DIAGNOSIS — E89.0 POSTABLATIVE HYPOTHYROIDISM: ICD-10-CM

## 2023-03-09 DIAGNOSIS — E55.9 VITAMIN D DEFICIENCY: ICD-10-CM

## 2023-03-09 DIAGNOSIS — M85.80 OSTEOPENIA, UNSPECIFIED LOCATION: ICD-10-CM

## 2023-03-09 DIAGNOSIS — Z12.31 SCREENING MAMMOGRAM, ENCOUNTER FOR: ICD-10-CM

## 2023-03-09 DIAGNOSIS — Z01.419 ENCOUNTER FOR GYNECOLOGICAL EXAMINATION WITHOUT ABNORMAL FINDING: Primary | ICD-10-CM

## 2023-03-09 DIAGNOSIS — Z09 ENCOUNTER FOR COLONOSCOPY FOLLOWING COLON POLYP REMOVAL: ICD-10-CM

## 2023-03-09 LAB
ESTRADIOL SERPL-MCNC: 103 PG/ML
PROGEST SERPL-MCNC: 5.6 NG/ML
T4 FREE SERPL-MCNC: 0.87 NG/DL (ref 0.71–1.51)
TSH SERPL DL<=0.005 MIU/L-ACNC: 0.26 UIU/ML (ref 0.4–4)

## 2023-03-09 PROCEDURE — 3008F PR BODY MASS INDEX (BMI) DOCUMENTED: ICD-10-PCS | Mod: CPTII,S$GLB,, | Performed by: OBSTETRICS & GYNECOLOGY

## 2023-03-09 PROCEDURE — 99999 PR PBB SHADOW E&M-EST. PATIENT-LVL IV: CPT | Mod: PBBFAC,,, | Performed by: OBSTETRICS & GYNECOLOGY

## 2023-03-09 PROCEDURE — 82670 ASSAY OF TOTAL ESTRADIOL: CPT | Performed by: INTERNAL MEDICINE

## 2023-03-09 PROCEDURE — 99999 PR PBB SHADOW E&M-EST. PATIENT-LVL IV: ICD-10-PCS | Mod: PBBFAC,,, | Performed by: OBSTETRICS & GYNECOLOGY

## 2023-03-09 PROCEDURE — 3079F PR MOST RECENT DIASTOLIC BLOOD PRESSURE 80-89 MM HG: ICD-10-PCS | Mod: CPTII,S$GLB,, | Performed by: OBSTETRICS & GYNECOLOGY

## 2023-03-09 PROCEDURE — 3079F DIAST BP 80-89 MM HG: CPT | Mod: CPTII,S$GLB,, | Performed by: OBSTETRICS & GYNECOLOGY

## 2023-03-09 PROCEDURE — 1160F PR REVIEW ALL MEDS BY PRESCRIBER/CLIN PHARMACIST DOCUMENTED: ICD-10-PCS | Mod: CPTII,S$GLB,, | Performed by: OBSTETRICS & GYNECOLOGY

## 2023-03-09 PROCEDURE — 84144 ASSAY OF PROGESTERONE: CPT | Performed by: INTERNAL MEDICINE

## 2023-03-09 PROCEDURE — 3008F BODY MASS INDEX DOCD: CPT | Mod: CPTII,S$GLB,, | Performed by: OBSTETRICS & GYNECOLOGY

## 2023-03-09 PROCEDURE — 99396 PREV VISIT EST AGE 40-64: CPT | Mod: S$GLB,,, | Performed by: OBSTETRICS & GYNECOLOGY

## 2023-03-09 PROCEDURE — 84443 ASSAY THYROID STIM HORMONE: CPT | Performed by: INTERNAL MEDICINE

## 2023-03-09 PROCEDURE — 84402 ASSAY OF FREE TESTOSTERONE: CPT | Performed by: INTERNAL MEDICINE

## 2023-03-09 PROCEDURE — 1159F MED LIST DOCD IN RCRD: CPT | Mod: CPTII,S$GLB,, | Performed by: OBSTETRICS & GYNECOLOGY

## 2023-03-09 PROCEDURE — 3074F PR MOST RECENT SYSTOLIC BLOOD PRESSURE < 130 MM HG: ICD-10-PCS | Mod: CPTII,S$GLB,, | Performed by: OBSTETRICS & GYNECOLOGY

## 2023-03-09 PROCEDURE — 1159F PR MEDICATION LIST DOCUMENTED IN MEDICAL RECORD: ICD-10-PCS | Mod: CPTII,S$GLB,, | Performed by: OBSTETRICS & GYNECOLOGY

## 2023-03-09 PROCEDURE — 84439 ASSAY OF FREE THYROXINE: CPT | Performed by: INTERNAL MEDICINE

## 2023-03-09 PROCEDURE — 99396 PR PREVENTIVE VISIT,EST,40-64: ICD-10-PCS | Mod: S$GLB,,, | Performed by: OBSTETRICS & GYNECOLOGY

## 2023-03-09 PROCEDURE — 1160F RVW MEDS BY RX/DR IN RCRD: CPT | Mod: CPTII,S$GLB,, | Performed by: OBSTETRICS & GYNECOLOGY

## 2023-03-09 PROCEDURE — 3074F SYST BP LT 130 MM HG: CPT | Mod: CPTII,S$GLB,, | Performed by: OBSTETRICS & GYNECOLOGY

## 2023-03-09 NOTE — PROGRESS NOTES
Subjective:       Patient ID: Alma Jackson is a 62 y.o. female.    Chief Complaint:  Well Woman, Gynecologic Exam, and Postmenopausal hormone replacement       History of Present Illness  HPI  Alma Jackson is a 62 y.o. female  here for her annual GYN exam.   She is doing well on her current HRT regimen and wants to stay on it.   She exercises regularly and eats a healthy diet. She does vape, but is gradually decreasing her Nicotine dose on this.  She does still have some urinary stress incontinence with coughing/sneezing since her hysterectomy but improved since doing Pelvic Floor PT.  Her PCP is with [facility] and would /could not order her Colonoscopy to be done in the [facility] system, wants to get it done with [facility].  Will place request.   Current Hormone Regimen:  Depo Estradiol 5mg/ Testosterone 76mg injections monthly    denies vaginal itching or irritation.  Denies vaginal discharge.  She is sexually active. She has had1 partner for 8 years .   History of abnormal pap: No  Last Pap: approximate date 2018 and was normal  Last MMG: normal--: BI-RADS Category 1: Negative-routine follow-up in 12 months  Last Dexa: 2022: Lumbar spine (L1-L4):              BMD is 1.198 g/cm2, T-score is 1.4, and Z-score is 3.0.   Total hip:BMD is 0.842 g/cm2, T-score is -0.8, and Z-score is 0.3.   Femoral neck:BMD is 0.686 g/cm2, T-score is -1.5, and Z-score is 0.0.     Last Colonoscopy:  colonoscopy 7 years ago with abnormalities.  denies domestic violence. She does feel safe at home.     Past Medical History:   Diagnosis Date    Cataract     Fibromyalgia     Hx of colonic polyps     Hyperlipidemia     Hypertension     Hyperthyroidism     Hypothyroidism     Irregular heart beat     Ophthalmic Graves disease      Past Surgical History:   Procedure Laterality Date    BREAST BIOPSY      LEFT     SECTION, CLASSIC      COLONOSCOPY N/A 2016    Procedure: COLONOSCOPY;  Surgeon:  Benny Menchaca MD;  Location: [facility] ENDO (4TH FLR);  Service: Endoscopy;  Laterality: N/A;  requests Dr Menchaca    CYSTOSCOPY N/A 12/10/2018    Procedure: CYSTOSCOPY;  Surgeon: Pinky Ko DO;  Location: [facility] OR;  Service: OB/GYN;  Laterality: N/A;    DILATION OF URETHRA N/A 12/10/2018    Procedure: DILATION, URETHRA;  Surgeon: Pinky Ko DO;  Location: [facility] OR;  Service: OB/GYN;  Laterality: N/A;    ENTEROCELE REPAIR N/A 12/10/2018    Procedure: REPAIR, ENTEROCELE;  Surgeon: Pinky Ko DO;  Location: [facility] OR;  Service: OB/GYN;  Laterality: N/A;    EYE SURGERY      LAPAROSCOPIC SALPINGO-OOPHORECTOMY Bilateral 12/10/2018    Procedure: SALPINGO-OOPHORECTOMY, LAPAROSCOPIC;  Surgeon: Pinky Ko DO;  Location: [facility] OR;  Service: OB/GYN;  Laterality: Bilateral;    LAPAROSCOPIC TOTAL HYSTERECTOMY N/A 12/10/2018    Procedure: HYSTERECTOMY, TOTAL, LAPAROSCOPIC; excision of umbilical mole;  Surgeon: Pinky Ko DO;  Location: [facility] OR;  Service: OB/GYN;  Laterality: N/A;    lasik ou      POSTERIOR REPAIR N/A 12/10/2018    Procedure: COLPORRHAPHY, POSTERIOR;  Surgeon: Pinky Ko DO;  Location: [facility] OR;  Service: OB/GYN;  Laterality: N/A;    thyroid radiation      TUBAL LIGATION       Social History     Socioeconomic History    Marital status: Significant Other   Tobacco Use    Smoking status: Every Day     Types: Vaping with nicotine     Last attempt to quit: 2011     Years since quittin.6    Smokeless tobacco: Never   Substance and Sexual Activity    Alcohol use: Yes     Alcohol/week: 0.0 standard drinks     Comment: SOCIALLY    Drug use: No    Sexual activity: Yes     Partners: Male     Birth control/protection: None     Comment: partner since , almost 7 years     Family History   Problem Relation Age of Onset    Heart failure Mother     Thyroid disease Mother     Stroke Mother     Parkinsonism Father     Hypertension Maternal Grandmother      "Stroke Maternal Grandmother     Diabetes Maternal Grandfather     Stroke Paternal Grandfather     Amblyopia Neg Hx     Blindness Neg Hx     Cancer Neg Hx     Cataracts Neg Hx     Glaucoma Neg Hx     Macular degeneration Neg Hx     Retinal detachment Neg Hx     Strabismus Neg Hx     Breast cancer Neg Hx     Colon cancer Neg Hx     Ovarian cancer Neg Hx      OB History          3    Para   3    Term   3            AB        Living             SAB        IAB        Ectopic        Multiple        Live Births                     /87   Ht 5' 2" (1.575 m)   Wt 67.2 kg (148 lb 3.2 oz)   LMP  (LMP Unknown)   BMI 27.11 kg/m²         GYN & OB History    Date of Last Pap: No result found    OB History    Para Term  AB Living   3 3 3         SAB IAB Ectopic Multiple Live Births                  # Outcome Date GA Lbr Arvind/2nd Weight Sex Delivery Anes PTL Lv   3 Term            2 Term            1 Term                Review of Systems  Review of Systems   Constitutional:  Negative for activity change, appetite change, fatigue and unexpected weight change.   HENT: Negative.     Eyes:  Negative for visual disturbance.   Respiratory:  Negative for shortness of breath and wheezing.    Cardiovascular:  Negative for chest pain, palpitations and leg swelling.   Gastrointestinal:  Negative for abdominal pain, bloating and blood in stool.   Endocrine: Negative for diabetes and hair loss.   Genitourinary:  Positive for bladder incontinence. Negative for decreased libido, dyspareunia, hot flashes, postmenopausal bleeding and vaginal dryness.   Musculoskeletal:  Negative for back pain and joint swelling.   Integumentary:  Negative for acne, hair changes and nipple discharge.   Neurological:  Negative for headaches.   Hematological:  Does not bruise/bleed easily.   Psychiatric/Behavioral:  Negative for depression and sleep disturbance. The patient is not nervous/anxious.    Breast: Negative for mastodynia " and nipple discharge        Objective:      Physical Exam:   Constitutional: She is oriented to person, place, and time. She appears well-developed and well-nourished.    HENT:   Head: Normocephalic and atraumatic.    Eyes: Pupils are equal, round, and reactive to light. EOM are normal.     Cardiovascular:  Normal rate and regular rhythm.             Pulmonary/Chest: Effort normal and breath sounds normal.   BREASTS:  no mass, no tenderness, no deformity and no retraction. Right breast exhibits no inverted nipple, no mass, no nipple discharge, no skin change, no tenderness, no bleeding and no swelling. Left breast exhibits no inverted nipple, no mass, no nipple discharge, no skin change, no tenderness, no bleeding and no swelling. Breasts are symmetrical.      re        Abdominal: Soft. Bowel sounds are normal.     Genitourinary:    Pelvic exam was performed with patient supine.      Genitourinary Comments: PELVIC: Normal external female genitalia without lesions. Normal hair distribution. Adequate perineal body, normal urethral meatus. Vagina Moist and Well Rugated without lesions or discharge. No significant cystocele or rectocele. Bimanual exam shows uterus and cervix to be surgically absent. Adnexa without masses or tenderness.  RECTAL: Deferred                 Musculoskeletal: Normal range of motion and moves all extremeties.       Neurological: She is alert and oriented to person, place, and time.    Skin: Skin is warm and dry.    Psychiatric: She has a normal mood and affect.            Assessment:        1. Encounter for gynecological examination without abnormal finding    2. Encounter for colonoscopy following colon polyp removal    3. Postmenopausal hormone replacement therapy    4. Screening mammogram, encounter for                Plan:      Problem List Items Addressed This Visit    None  Visit Diagnoses       Encounter for gynecological examination without abnormal finding    -  Primary    Encounter  for colonoscopy following colon polyp removal        Relevant Orders    Ambulatory referral/consult to Gastroenterology    Postmenopausal hormone replacement therapy      Depo Estradiol 5/ Testosterone 76 (pending labs)    Screening mammogram, encounter for        Relevant Orders    Mammo Digital Screening Bilat w/ Tim             Follow up in about 1 year (around 3/9/2024).

## 2023-03-10 ENCOUNTER — PATIENT MESSAGE (OUTPATIENT)
Dept: ENDOCRINOLOGY | Facility: CLINIC | Age: 63
End: 2023-03-10
Payer: COMMERCIAL

## 2023-03-10 DIAGNOSIS — E89.0 POSTABLATIVE HYPOTHYROIDISM: Primary | ICD-10-CM

## 2023-03-10 RX ORDER — THYROID 90 MG/1
TABLET ORAL
Qty: 90 TABLET | Refills: 3 | Status: SHIPPED | OUTPATIENT
Start: 2023-03-10 | End: 2024-03-01

## 2023-03-13 ENCOUNTER — CLINICAL SUPPORT (OUTPATIENT)
Dept: OBSTETRICS AND GYNECOLOGY | Facility: CLINIC | Age: 63
End: 2023-03-13
Payer: COMMERCIAL

## 2023-03-13 DIAGNOSIS — N95.1 MENOPAUSAL SYMPTOMS: Primary | ICD-10-CM

## 2023-03-13 LAB — TESTOST FREE SERPL-MCNC: 2.9 PG/ML

## 2023-03-13 PROCEDURE — 96372 PR INJECTION,THERAP/PROPH/DIAG2ST, IM OR SUBCUT: ICD-10-PCS | Mod: S$GLB,,, | Performed by: OBSTETRICS & GYNECOLOGY

## 2023-03-13 PROCEDURE — 99999 PR PBB SHADOW E&M-EST. PATIENT-LVL I: ICD-10-PCS | Mod: PBBFAC,,,

## 2023-03-13 PROCEDURE — 96372 THER/PROPH/DIAG INJ SC/IM: CPT | Mod: S$GLB,,, | Performed by: OBSTETRICS & GYNECOLOGY

## 2023-03-13 PROCEDURE — 99999 PR PBB SHADOW E&M-EST. PATIENT-LVL I: CPT | Mod: PBBFAC,,,

## 2023-03-13 RX ORDER — TESTOSTERONE CYPIONATE 200 MG/ML
76 INJECTION, SOLUTION INTRAMUSCULAR
Status: COMPLETED | OUTPATIENT
Start: 2023-03-13 | End: 2023-06-05

## 2023-03-13 RX ADMIN — TESTOSTERONE CYPIONATE 76 MG: 200 INJECTION, SOLUTION INTRAMUSCULAR at 08:03

## 2023-03-13 NOTE — PROGRESS NOTES
Here for hormone therapy injection, no complaints at this time. Injection given as ordered, tolerated well no reports of pain prior to or post injection. Advised to return to clinic as scheduled      Site:RB    Testerone:76 mg     DEPO ESTRADIOL: 5 mg    CLINIC SUPPLIED MEDICATION

## 2023-03-15 DIAGNOSIS — Z12.11 COLON CANCER SCREENING: Primary | ICD-10-CM

## 2023-03-17 ENCOUNTER — PATIENT MESSAGE (OUTPATIENT)
Dept: INFECTIOUS DISEASES | Facility: CLINIC | Age: 63
End: 2023-03-17
Payer: COMMERCIAL

## 2023-03-18 ENCOUNTER — TELEPHONE (OUTPATIENT)
Dept: ENDOSCOPY | Facility: HOSPITAL | Age: 63
End: 2023-03-18
Payer: COMMERCIAL

## 2023-03-18 DIAGNOSIS — Z12.11 COLON CANCER SCREENING: Primary | ICD-10-CM

## 2023-03-18 NOTE — TELEPHONE ENCOUNTER
----- Message from Uma Salamanca sent at 3/16/2023  1:32 PM CDT -----  Regarding: schedule colonoscopy  The patient called requesting to be scheduled for colonoscopy    No further information provided      Patient can be contacted @#  [phone] (home) [phone] (work)

## 2023-04-10 ENCOUNTER — CLINICAL SUPPORT (OUTPATIENT)
Dept: OBSTETRICS AND GYNECOLOGY | Facility: CLINIC | Age: 63
End: 2023-04-10
Payer: COMMERCIAL

## 2023-04-10 DIAGNOSIS — N95.1 MENOPAUSAL SYMPTOMS: Primary | ICD-10-CM

## 2023-04-10 PROCEDURE — 96372 PR INJECTION,THERAP/PROPH/DIAG2ST, IM OR SUBCUT: ICD-10-PCS | Mod: S$GLB,,, | Performed by: OBSTETRICS & GYNECOLOGY

## 2023-04-10 PROCEDURE — 99999 PR PBB SHADOW E&M-EST. PATIENT-LVL I: ICD-10-PCS | Mod: PBBFAC,,,

## 2023-04-10 PROCEDURE — 99999 PR PBB SHADOW E&M-EST. PATIENT-LVL I: CPT | Mod: PBBFAC,,,

## 2023-04-10 PROCEDURE — 96372 THER/PROPH/DIAG INJ SC/IM: CPT | Mod: S$GLB,,, | Performed by: OBSTETRICS & GYNECOLOGY

## 2023-04-10 RX ADMIN — TESTOSTERONE CYPIONATE 76 MG: 200 INJECTION, SOLUTION INTRAMUSCULAR at 08:04

## 2023-04-10 NOTE — PROGRESS NOTES
Here for hormone therapy injection, no complaints at this time. Injection given as ordered, tolerated well no reports of pain prior to or post injection. Advised to return to clinic as scheduled      Site:LB    Testerone:76 mg       DEPO ESTRADIOL: 5 mg    CLINIC SUPPLIED MEDICATION

## 2023-04-21 ENCOUNTER — LAB VISIT (OUTPATIENT)
Dept: LAB | Facility: HOSPITAL | Age: 63
End: 2023-04-21
Attending: INTERNAL MEDICINE
Payer: COMMERCIAL

## 2023-04-21 DIAGNOSIS — E89.0 POSTABLATIVE HYPOTHYROIDISM: ICD-10-CM

## 2023-04-21 LAB — TSH SERPL DL<=0.005 MIU/L-ACNC: 0.73 UIU/ML (ref 0.4–4)

## 2023-04-21 PROCEDURE — 36415 COLL VENOUS BLD VENIPUNCTURE: CPT | Mod: PO | Performed by: INTERNAL MEDICINE

## 2023-04-21 PROCEDURE — 84443 ASSAY THYROID STIM HORMONE: CPT | Performed by: INTERNAL MEDICINE

## 2023-04-24 ENCOUNTER — PATIENT MESSAGE (OUTPATIENT)
Dept: ENDOCRINOLOGY | Facility: CLINIC | Age: 63
End: 2023-04-24
Payer: COMMERCIAL

## 2023-04-24 DIAGNOSIS — E89.0 POSTABLATIVE HYPOTHYROIDISM: Primary | ICD-10-CM

## 2023-04-27 ENCOUNTER — HOSPITAL ENCOUNTER (OUTPATIENT)
Dept: RADIOLOGY | Facility: HOSPITAL | Age: 63
Discharge: HOME | End: 2023-04-27
Attending: OBSTETRICS & GYNECOLOGY
Payer: COMMERCIAL

## 2023-04-27 VITALS — WEIGHT: 148 LBS | HEIGHT: 63 IN | BODY MASS INDEX: 26.22 KG/M2

## 2023-04-27 DIAGNOSIS — Z12.31 SCREENING MAMMOGRAM, ENCOUNTER FOR: ICD-10-CM

## 2023-04-27 PROCEDURE — 77063 MAMMO DIGITAL SCREENING BILAT WITH TOMO: ICD-10-PCS | Mod: 26,,, | Performed by: RADIOLOGY

## 2023-04-27 PROCEDURE — 77067 SCR MAMMO BI INCL CAD: CPT | Mod: 26,,, | Performed by: RADIOLOGY

## 2023-04-27 PROCEDURE — 77063 BREAST TOMOSYNTHESIS BI: CPT | Mod: 26,,, | Performed by: RADIOLOGY

## 2023-04-27 PROCEDURE — 77067 SCR MAMMO BI INCL CAD: CPT | Mod: TC,PO

## 2023-04-27 PROCEDURE — 77067 MAMMO DIGITAL SCREENING BILAT WITH TOMO: ICD-10-PCS | Mod: 26,,, | Performed by: RADIOLOGY

## 2023-05-08 ENCOUNTER — CLINICAL SUPPORT (OUTPATIENT)
Dept: OBSTETRICS AND GYNECOLOGY | Facility: CLINIC | Age: 63
End: 2023-05-08
Payer: COMMERCIAL

## 2023-05-08 DIAGNOSIS — N95.1 MENOPAUSAL SYMPTOMS: Primary | ICD-10-CM

## 2023-05-08 PROCEDURE — 96372 PR INJECTION,THERAP/PROPH/DIAG2ST, IM OR SUBCUT: ICD-10-PCS | Mod: S$GLB,,, | Performed by: PHYSICIAN ASSISTANT

## 2023-05-08 PROCEDURE — 99999 PR PBB SHADOW E&M-EST. PATIENT-LVL II: ICD-10-PCS | Mod: PBBFAC,,,

## 2023-05-08 PROCEDURE — 99999 PR PBB SHADOW E&M-EST. PATIENT-LVL II: CPT | Mod: PBBFAC,,,

## 2023-05-08 PROCEDURE — 96372 THER/PROPH/DIAG INJ SC/IM: CPT | Mod: S$GLB,,, | Performed by: PHYSICIAN ASSISTANT

## 2023-05-08 PROCEDURE — 96372 THER/PROPH/DIAG INJ SC/IM: CPT | Mod: S$GLB,,, | Performed by: OBSTETRICS & GYNECOLOGY

## 2023-05-08 PROCEDURE — 96372 PR INJECTION,THERAP/PROPH/DIAG2ST, IM OR SUBCUT: ICD-10-PCS | Mod: S$GLB,,, | Performed by: OBSTETRICS & GYNECOLOGY

## 2023-05-08 RX ADMIN — TESTOSTERONE CYPIONATE 76 MG: 200 INJECTION, SOLUTION INTRAMUSCULAR at 08:05

## 2023-05-08 NOTE — PROGRESS NOTES
Here for hormone therapy injection. After obtaining consent, and per orders of Dr. JOHNSON, injection of TESTOSTERONE CYPIONATE Lot: [id number] Exp:31 MAY 2025 and ESTRADIOL CYPIONATE Lot [id number] Exp 30 JUN 2027 given in the [facility] by CHICO BUENROSTRO. Patient tolerated well and band aid applied. No complaints at this time. Patient instructed to remain in clinic for 15 minutes afterwards, and to report any adverse reaction to me immediately. No report of pain prior to or after injection. Return to clinic as scheduled.      CLINIC SUPPLIED MEDICATION    TESTOSTERONE CYPIONATE 76 MG    ESTRADIOL CYPIONATE 5 MG

## 2023-05-20 DIAGNOSIS — N95.1 MENOPAUSAL SYMPTOM: ICD-10-CM

## 2023-05-22 RX ORDER — PROGESTERONE 200 MG/1
CAPSULE ORAL
Qty: 30 CAPSULE | Refills: 11 | Status: SHIPPED | OUTPATIENT
Start: 2023-05-22 | End: 2024-02-14

## 2023-06-05 ENCOUNTER — CLINICAL SUPPORT (OUTPATIENT)
Dept: OBSTETRICS AND GYNECOLOGY | Facility: CLINIC | Age: 63
End: 2023-06-05
Payer: COMMERCIAL

## 2023-06-05 DIAGNOSIS — N95.1 MENOPAUSAL SYMPTOMS: Primary | ICD-10-CM

## 2023-06-05 PROCEDURE — 96372 THER/PROPH/DIAG INJ SC/IM: CPT | Mod: S$GLB,,, | Performed by: OBSTETRICS & GYNECOLOGY

## 2023-06-05 PROCEDURE — 99999 PR PBB SHADOW E&M-EST. PATIENT-LVL I: CPT | Mod: PBBFAC,,,

## 2023-06-05 PROCEDURE — 96372 PR INJECTION,THERAP/PROPH/DIAG2ST, IM OR SUBCUT: ICD-10-PCS | Mod: S$GLB,,, | Performed by: OBSTETRICS & GYNECOLOGY

## 2023-06-05 PROCEDURE — 99999 PR PBB SHADOW E&M-EST. PATIENT-LVL I: ICD-10-PCS | Mod: PBBFAC,,,

## 2023-06-05 RX ADMIN — TESTOSTERONE CYPIONATE 76 MG: 200 INJECTION, SOLUTION INTRAMUSCULAR at 08:06

## 2023-06-26 ENCOUNTER — TELEPHONE (OUTPATIENT)
Dept: OBSTETRICS AND GYNECOLOGY | Facility: CLINIC | Age: 63
End: 2023-06-26
Payer: COMMERCIAL

## 2023-06-26 NOTE — TELEPHONE ENCOUNTER
Called patient and scheduled appointment on tomorrow with the walk in clinic. Patient said thanks.

## 2023-06-26 NOTE — TELEPHONE ENCOUNTER
----- Message from Jayda Cordova MA sent at 6/26/2023  9:19 AM CDT -----  Pt would like to be seen today or tomorrow regarding bumps in on her vulva X 2 weeks. # [phone]

## 2023-06-28 ENCOUNTER — OFFICE VISIT (OUTPATIENT)
Dept: OBSTETRICS AND GYNECOLOGY | Facility: CLINIC | Age: 63
End: 2023-06-28
Payer: COMMERCIAL

## 2023-06-28 VITALS — WEIGHT: 150.56 LBS | BODY MASS INDEX: 26.68 KG/M2 | HEIGHT: 63 IN

## 2023-06-28 DIAGNOSIS — N90.89 SKIN TAG OF FEMALE PERINEUM: ICD-10-CM

## 2023-06-28 DIAGNOSIS — L73.9 FOLLICULITIS OF PERINEUM: Primary | ICD-10-CM

## 2023-06-28 PROCEDURE — 99999 PR PBB SHADOW E&M-EST. PATIENT-LVL III: ICD-10-PCS | Mod: PBBFAC,,,

## 2023-06-28 PROCEDURE — 3008F PR BODY MASS INDEX (BMI) DOCUMENTED: ICD-10-PCS | Mod: CPTII,S$GLB,, | Performed by: OBSTETRICS & GYNECOLOGY

## 2023-06-28 PROCEDURE — 99999 PR PBB SHADOW E&M-EST. PATIENT-LVL III: CPT | Mod: PBBFAC,,,

## 2023-06-28 PROCEDURE — 3008F BODY MASS INDEX DOCD: CPT | Mod: CPTII,S$GLB,, | Performed by: OBSTETRICS & GYNECOLOGY

## 2023-06-28 PROCEDURE — 1159F PR MEDICATION LIST DOCUMENTED IN MEDICAL RECORD: ICD-10-PCS | Mod: CPTII,S$GLB,, | Performed by: OBSTETRICS & GYNECOLOGY

## 2023-06-28 PROCEDURE — 1159F MED LIST DOCD IN RCRD: CPT | Mod: CPTII,S$GLB,, | Performed by: OBSTETRICS & GYNECOLOGY

## 2023-06-28 PROCEDURE — 99213 PR OFFICE/OUTPT VISIT, EST, LEVL III, 20-29 MIN: ICD-10-PCS | Mod: S$GLB,,, | Performed by: OBSTETRICS & GYNECOLOGY

## 2023-06-28 PROCEDURE — 1160F RVW MEDS BY RX/DR IN RCRD: CPT | Mod: CPTII,S$GLB,, | Performed by: OBSTETRICS & GYNECOLOGY

## 2023-06-28 PROCEDURE — 1160F PR REVIEW ALL MEDS BY PRESCRIBER/CLIN PHARMACIST DOCUMENTED: ICD-10-PCS | Mod: CPTII,S$GLB,, | Performed by: OBSTETRICS & GYNECOLOGY

## 2023-06-28 PROCEDURE — 99213 OFFICE O/P EST LOW 20 MIN: CPT | Mod: S$GLB,,, | Performed by: OBSTETRICS & GYNECOLOGY

## 2023-06-28 RX ORDER — MUPIROCIN 20 MG/G
OINTMENT TOPICAL 3 TIMES DAILY
Qty: 15 G | Refills: 0 | Status: SHIPPED | OUTPATIENT
Start: 2023-06-28 | End: 2023-07-03

## 2023-06-28 NOTE — PROGRESS NOTES
History & Physical  Gynecology      SUBJECTIVE:     Chief Complaint:   Vaginal Bump     History of Present Illness  Alma Jackson is a 62 y.o. female presenting for vaginal bump. States that she frequently gets waxed and noticed a bump to her left labia about 3 weeks ago. It is not painful. Also report red spot to right vulva. States that she had an ingrown hair that she popped a few weeks ago, went away. Thinks this may be the same, but wanted to get checked out. Is sexually active with her , but has not had relations since noticing bumps.     BP Readings from Last 2 Encounters:   23 127/87   21 116/81          Review of patient's allergies indicates:   Allergen Reactions    Penicillins     Azithromycin (bulk) Rash     z-pack    Gadolinium-containing contrast media Nausea And Vomiting     Reports a hot/burning sensation throughout body, feeling ill, nausea and vomiting.         Past Medical History:   Diagnosis Date    Cataract     Fibromyalgia     Hx of colonic polyps     Hyperlipidemia     Hypertension     Hyperthyroidism     Hypothyroidism     Irregular heart beat     Ophthalmic Graves disease      Past Surgical History:   Procedure Laterality Date    BREAST BIOPSY      LEFT     SECTION, CLASSIC      COLONOSCOPY N/A 2016    Procedure: COLONOSCOPY;  Surgeon: Benny Menchaca MD;  Location: [address]);  Service: Endoscopy;  Laterality: N/A;  requests Dr Menchaca    CYSTOSCOPY N/A 12/10/2018    Procedure: CYSTOSCOPY;  Surgeon: Pinky Ko DO;  Location: [facility];  Service: OB/GYN;  Laterality: N/A;    DILATION OF URETHRA N/A 12/10/2018    Procedure: DILATION, URETHRA;  Surgeon: Pinky Ko DO;  Location: [facility] OR;  Service: OB/GYN;  Laterality: N/A;    ENTEROCELE REPAIR N/A 12/10/2018    Procedure: REPAIR, ENTEROCELE;  Surgeon: Pinky Ko DO;  Location: [facility] OR;  Service: OB/GYN;  Laterality: N/A;    EYE SURGERY      LAPAROSCOPIC  SALPINGO-OOPHORECTOMY Bilateral 12/10/2018    Procedure: SALPINGO-OOPHORECTOMY, LAPAROSCOPIC;  Surgeon: Pinky Ko DO;  Location: [facility] OR;  Service: OB/GYN;  Laterality: Bilateral;    LAPAROSCOPIC TOTAL HYSTERECTOMY N/A 12/10/2018    Procedure: HYSTERECTOMY, TOTAL, LAPAROSCOPIC; excision of umbilical mole;  Surgeon: Pinky Ko DO;  Location: [facility] OR;  Service: OB/GYN;  Laterality: N/A;    lasik ou      POSTERIOR REPAIR N/A 12/10/2018    Procedure: COLPORRHAPHY, POSTERIOR;  Surgeon: Pinky Ko DO;  Location: [facility] OR;  Service: OB/GYN;  Laterality: N/A;    thyroid radiation      TUBAL LIGATION       OB History          3    Para   3    Term   3            AB        Living             SAB        IAB        Ectopic        Multiple        Live Births                   Family History   Problem Relation Age of Onset    Heart failure Mother     Thyroid disease Mother     Stroke Mother     Parkinsonism Father     Hypertension Maternal Grandmother     Stroke Maternal Grandmother     Diabetes Maternal Grandfather     Stroke Paternal Grandfather     Amblyopia Neg Hx     Blindness Neg Hx     Cancer Neg Hx     Cataracts Neg Hx     Glaucoma Neg Hx     Macular degeneration Neg Hx     Retinal detachment Neg Hx     Strabismus Neg Hx     Breast cancer Neg Hx     Colon cancer Neg Hx     Ovarian cancer Neg Hx      Social History     Tobacco Use    Smoking status: Every Day     Types: Vaping with nicotine     Last attempt to quit: 2011     Years since quittin.0    Smokeless tobacco: Never   Substance Use Topics    Alcohol use: Yes     Alcohol/week: 0.0 standard drinks     Comment: SOCIALLY    Drug use: No       Current Outpatient Medications   Medication Sig    diltiaZEM (CARDIZEM) 30 MG tablet every 12 (twelve) hours.     doxycycline (MONODOX) 100 MG capsule TK 1 TO 2 CS PO QD WF AND A TALL GLASS OF WATER    finasteride (PROSCAR) 5 mg tablet Take 5 mg by mouth once daily.     fluocinolone acetonide oil 0.01 % Drop GAL AA BID    hydrochlorothiazide (HYDRODIURIL) 25 MG tablet Take 1 tablet (25 mg total) by mouth once daily.    progesterone (PROMETRIUM) 200 MG capsule TAKE 1 CAPSULE(200 MG) BY MOUTH EVERY NIGHT    spironolactone (ALDACTONE) 25 MG tablet     thyroid, pork, (NP THYROID) 90 mg Tab Take 1 tablet by mouth Mon-Sat and skip on Sunday    clonazePAM (KLONOPIN) 0.5 MG tablet Take 1 tablet (0.5 mg total) by mouth every evening.    diphth,pertus,acell,,tetanus (BOOSTRIX) 2.5-8-5 Lf-mcg-Lf/0.5mL Syrg injection Inject into the muscle. (Patient not taking: Reported on 6/28/2023)    mupirocin (BACTROBAN) 2 % ointment Apply topically 3 (three) times daily. for 5 days     Current Facility-Administered Medications   Medication    estradiol cypionate 5 mg/mL injection 5 mg         Review of Systems:  Review of Systems   Respiratory:  Negative for cough, shortness of breath and wheezing.    Gastrointestinal:  Negative for abdominal pain.   Genitourinary:  Positive for genital sores. Negative for vaginal pain.      OBJECTIVE:     Physical Exam:  Physical Exam  Constitutional:       Appearance: Normal appearance.   Cardiovascular:      Rate and Rhythm: Normal rate.   Pulmonary:      Effort: Pulmonary effort is normal.   Genitourinary:     Labia:         Right: No tenderness.         Left: Lesion present. No tenderness.           Comments: ~5 mm pustule to left labia majora   Multiple 2mm skin tags to right groin  Skin:     General: Skin is warm and dry.   Neurological:      General: No focal deficit present.      Mental Status: She is alert and oriented to person, place, and time.   Psychiatric:         Mood and Affect: Mood normal.         Behavior: Behavior normal.       ASSESSMENT:       ICD-10-CM ICD-9-CM    1. Folliculitis of perineum  L73.9 704.8 mupirocin (BACTROBAN) 2 % ointment      2. Skin tag of female perineum  N90.89 624.8              Plan:     - Pustule consistent with ingrown  hair  - Discussed using warm compresses   - May use Bactroban PRN     Counseling time: 20 mins     Maria Elena Ramirez, NP-C  OBGYN

## 2023-07-03 ENCOUNTER — CLINICAL SUPPORT (OUTPATIENT)
Dept: OBSTETRICS AND GYNECOLOGY | Facility: CLINIC | Age: 63
End: 2023-07-03
Payer: COMMERCIAL

## 2023-07-03 DIAGNOSIS — N95.1 MENOPAUSAL SYMPTOMS: Primary | ICD-10-CM

## 2023-07-03 PROCEDURE — 96372 THER/PROPH/DIAG INJ SC/IM: CPT | Mod: S$GLB,,, | Performed by: PHYSICIAN ASSISTANT

## 2023-07-03 PROCEDURE — 96372 PR INJECTION,THERAP/PROPH/DIAG2ST, IM OR SUBCUT: ICD-10-PCS | Mod: S$GLB,,, | Performed by: PHYSICIAN ASSISTANT

## 2023-07-03 PROCEDURE — 99999 PR PBB SHADOW E&M-EST. PATIENT-LVL I: CPT | Mod: PBBFAC,,,

## 2023-07-03 PROCEDURE — 99999 PR PBB SHADOW E&M-EST. PATIENT-LVL I: ICD-10-PCS | Mod: PBBFAC,,,

## 2023-07-03 RX ORDER — TESTOSTERONE CYPIONATE 200 MG/ML
76 INJECTION, SOLUTION INTRAMUSCULAR
Status: COMPLETED | OUTPATIENT
Start: 2023-07-03 | End: 2023-10-23

## 2023-07-03 RX ADMIN — TESTOSTERONE CYPIONATE 76 MG: 200 INJECTION, SOLUTION INTRAMUSCULAR at 08:07

## 2023-07-21 ENCOUNTER — CLINICAL SUPPORT (OUTPATIENT)
Dept: ENDOSCOPY | Facility: HOSPITAL | Age: 63
End: 2023-07-21
Attending: OBSTETRICS & GYNECOLOGY
Payer: COMMERCIAL

## 2023-07-21 ENCOUNTER — TELEPHONE (OUTPATIENT)
Dept: ENDOSCOPY | Facility: HOSPITAL | Age: 63
End: 2023-07-21

## 2023-07-21 DIAGNOSIS — Z86.010 HISTORY OF COLON POLYPS: Primary | ICD-10-CM

## 2023-07-21 DIAGNOSIS — Z12.11 COLON CANCER SCREENING: ICD-10-CM

## 2023-07-21 RX ORDER — SODIUM, POTASSIUM,MAG SULFATES 17.5-3.13G
1 SOLUTION, RECONSTITUTED, ORAL ORAL DAILY
Qty: 1 KIT | Refills: 0 | Status: SHIPPED | OUTPATIENT
Start: 2023-07-21 | End: 2023-07-23

## 2023-07-21 NOTE — TELEPHONE ENCOUNTER
Spoke to patient to schedule procedure(s) Colonoscopy       Physician to perform procedure(s) Dr. SANDRO Ballard  Date of Procedure (s) 7/27/23  Arrival Time 6:30 AM  Time of Procedure(s) 7:30 AM   Location of Procedure(s) [facility]  Type of Rx Prep sent to patient: Suprep  Instructions provided to patient via [facility]    Patient was informed on the following information and verbalized understanding. Screening questionnaire reviewed with patient and complete. If procedure requires anesthesia, a responsible adult needs to be present to accompany the patient home, patient cannot drive after receiving anesthesia. Appointment details are tentative, especially check-in time. Patient will receive a prep-op call 4 days prior to confirm check-in time for procedure. If applicable the patient should contact their pharmacy to verify Rx for procedure prep is ready for pick-up. Patient was advised to call the scheduling department at [phone] if pharmacy states no Rx is available. Patient was advised to call the endoscopy scheduling department if any questions or concerns arise.      [facility] Scheduling Department

## 2023-07-27 ENCOUNTER — ANESTHESIA EVENT (OUTPATIENT)
Dept: ENDOSCOPY | Facility: HOSPITAL | Age: 63
End: 2023-07-27
Payer: COMMERCIAL

## 2023-07-27 ENCOUNTER — HOSPITAL ENCOUNTER (OUTPATIENT)
Facility: HOSPITAL | Age: 63
Discharge: HOME | End: 2023-07-27
Attending: INTERNAL MEDICINE | Admitting: INTERNAL MEDICINE
Payer: COMMERCIAL

## 2023-07-27 ENCOUNTER — ANESTHESIA (OUTPATIENT)
Dept: ENDOSCOPY | Facility: HOSPITAL | Age: 63
End: 2023-07-27
Payer: COMMERCIAL

## 2023-07-27 VITALS
OXYGEN SATURATION: 98 % | WEIGHT: 150 LBS | RESPIRATION RATE: 14 BRPM | DIASTOLIC BLOOD PRESSURE: 82 MMHG | BODY MASS INDEX: 26.58 KG/M2 | HEIGHT: 63 IN | HEART RATE: 82 BPM | TEMPERATURE: 98 F | SYSTOLIC BLOOD PRESSURE: 128 MMHG

## 2023-07-27 DIAGNOSIS — Z86.010 HISTORY OF COLON POLYPS: ICD-10-CM

## 2023-07-27 PROCEDURE — 25000003 PHARM REV CODE 250: Performed by: INTERNAL MEDICINE

## 2023-07-27 PROCEDURE — E9220 PRA ENDO ANESTHESIA: ICD-10-PCS | Mod: ,,, | Performed by: NURSE ANESTHETIST, CERTIFIED REGISTERED

## 2023-07-27 PROCEDURE — 37000009 HC ANESTHESIA EA ADD 15 MINS: Performed by: INTERNAL MEDICINE

## 2023-07-27 PROCEDURE — G0105 COLORECTAL SCRN; HI RISK IND: ICD-10-PCS | Mod: ,,, | Performed by: INTERNAL MEDICINE

## 2023-07-27 PROCEDURE — E9220 PRA ENDO ANESTHESIA: HCPCS | Mod: ,,, | Performed by: NURSE ANESTHETIST, CERTIFIED REGISTERED

## 2023-07-27 PROCEDURE — G0105 COLORECTAL SCRN; HI RISK IND: HCPCS | Mod: ,,, | Performed by: INTERNAL MEDICINE

## 2023-07-27 PROCEDURE — 37000008 HC ANESTHESIA 1ST 15 MINUTES: Performed by: INTERNAL MEDICINE

## 2023-07-27 PROCEDURE — 25000003 PHARM REV CODE 250: Performed by: NURSE ANESTHETIST, CERTIFIED REGISTERED

## 2023-07-27 PROCEDURE — G0105 COLORECTAL SCRN; HI RISK IND: HCPCS | Performed by: INTERNAL MEDICINE

## 2023-07-27 PROCEDURE — 63600175 PHARM REV CODE 636 W HCPCS: Performed by: NURSE ANESTHETIST, CERTIFIED REGISTERED

## 2023-07-27 RX ORDER — SODIUM CHLORIDE 9 MG/ML
INJECTION, SOLUTION INTRAVENOUS CONTINUOUS
Status: DISCONTINUED | OUTPATIENT
Start: 2023-07-27 | End: 2023-07-27 | Stop reason: HOSPADM

## 2023-07-27 RX ORDER — LIDOCAINE HYDROCHLORIDE 20 MG/ML
INJECTION INTRAVENOUS
Status: DISCONTINUED | OUTPATIENT
Start: 2023-07-27 | End: 2023-07-27

## 2023-07-27 RX ORDER — PROPOFOL 10 MG/ML
VIAL (ML) INTRAVENOUS CONTINUOUS PRN
Status: DISCONTINUED | OUTPATIENT
Start: 2023-07-27 | End: 2023-07-27

## 2023-07-27 RX ORDER — PROPOFOL 10 MG/ML
VIAL (ML) INTRAVENOUS
Status: DISCONTINUED | OUTPATIENT
Start: 2023-07-27 | End: 2023-07-27

## 2023-07-27 RX ADMIN — LIDOCAINE HYDROCHLORIDE 50 MG: 20 INJECTION INTRAVENOUS at 07:07

## 2023-07-27 RX ADMIN — PROPOFOL 70 MG: 10 INJECTION, EMULSION INTRAVENOUS at 07:07

## 2023-07-27 RX ADMIN — SODIUM CHLORIDE: 0.9 INJECTION, SOLUTION INTRAVENOUS at 07:07

## 2023-07-27 RX ADMIN — PROPOFOL 150 MCG/KG/MIN: 10 INJECTION, EMULSION INTRAVENOUS at 07:07

## 2023-07-27 NOTE — PROVATION PATIENT INSTRUCTIONS
Discharge Summary/Instructions after an Endoscopic Procedure  Patient Name: Alma Jackson  Patient MRN: [id number]  Patient YOB: 1960  Thursday, July 27, 2023  Saud Ballard MD  Dear patient,  As a result of recent federal legislation (The Federal Cures Act), you may   receive lab or pathology results from your procedure in your [facility]   account before your physician is able to contact you. Your physician or   their representative will relay the results to you with their   recommendations at their soonest availability.  Thank you,  RESTRICTIONS:  During your procedure today, you received medications for sedation.  These   medications may affect your judgment, balance and coordination.  Therefore,   for 24 hours, you have the following restrictions:   - DO NOT drive a car, operate machinery, make legal/financial decisions,   sign important papers or drink alcohol.    ACTIVITY:  Today: no heavy lifting, straining or running due to procedural   sedation/anesthesia.  The following day: return to full activity including work.  DIET:  Eat and drink normally unless instructed otherwise.     TREATMENT FOR COMMON SIDE EFFECTS:  - Mild abdominal pain, nausea, belching, bloating or excessive gas:  rest,   eat lightly and use a heating pad.  - Sore Throat: treat with throat lozenges and/or gargle with warm salt   water.  - Because air was used during the procedure, expelling large amounts of air   from your rectum or belching is normal.  - If a bowel prep was taken, you may not have a bowel movement for 1-3 days.    This is normal.  SYMPTOMS TO WATCH FOR AND REPORT TO YOUR PHYSICIAN:  1. Abdominal pain or bloating, other than gas cramps.  2. Chest pain.  3. Back pain.  4. Signs of infection such as: chills or fever occurring within 24 hours   after the procedure.  5. Rectal bleeding, which would show as bright red, maroon, or black stools.   (A tablespoon of blood from the rectum is not serious, especially  if   hemorrhoids are present.)  6. Vomiting.  7. Weakness or dizziness.  GO DIRECTLY TO THE NEAREST EMERGENCY ROOM IF YOU HAVE ANY OF THE FOLLOWING:      Difficulty breathing              Chills and/or fever over 101 F   Persistent vomiting and/or vomiting blood   Severe abdominal pain   Severe chest pain   Black, tarry stools   Bleeding- more than one tablespoon   Any other symptom or condition that you feel may need urgent attention  Your doctor recommends these additional instructions:  If any biopsies were taken, your doctors clinic will contact you in 1 to 2   weeks with any results.  - Discharge patient to home.   - High fiber diet indefinitely.   - Continue present medications.   - Use fiber, for example Citrucel, Fibercon, Konsyl or Metamucil.   - Repeat colonoscopy in 5 years for surveillance.   - Return to referring physician as previously scheduled.   - Patient has a contact number available for emergencies.  The signs and   symptoms of potential delayed complications were discussed with the   patient.  Return to normal activities tomorrow.  Written discharge   instructions were provided to the patient.  For questions, problems or results please call your physician - Saud Ballard MD at Work:  [phone].  [facility], EMERGENCY ROOM PHONE NUMBER: [phone]  IF A COMPLICATION OR EMERGENCY SITUATION ARISES AND YOU ARE UNABLE TO REACH   YOUR PHYSICIAN - GO DIRECTLY TO THE EMERGENCY ROOM.  Saud Ballard MD  7/27/2023 8:00:44 AM  This report has been verified and signed electronically.  Dear patient,  As a result of recent federal legislation (The Federal Cures Act), you may   receive lab or pathology results from your procedure in your [facility]   account before your physician is able to contact you. Your physician or   their representative will relay the results to you with their   recommendations at their soonest availability.  Thank you,  PROVATION

## 2023-07-27 NOTE — ANESTHESIA PREPROCEDURE EVALUATION
07/27/2023  Alma Jackson is a 62 y.o., female.      Pre-op Assessment    I have reviewed the Patient Summary Reports.          Review of Systems  Anesthesia Hx:  No problems with previous Anesthesia    Social:  Non-Smoker    Hematology/Oncology:  Hematology Normal   Oncology Normal     EENT/Dental:EENT/Dental Normal   Cardiovascular:   Hypertension    Pulmonary:  Pulmonary Normal    Renal/:  Renal/ Normal     Hepatic/GI:  Hepatic/GI Normal    Musculoskeletal:  Musculoskeletal Normal    Neurological:   Neuromuscular Disease, (Fibromyalgia)    Endocrine:   Hypothyroidism    Dermatological:  Skin Normal    Psych:  Psychiatric Normal           Physical Exam  General: Alert and Oriented    Airway:  Mallampati: II / II  Mouth Opening: Normal  TM Distance: Normal  Tongue: Normal  Neck ROM: Normal ROM    Dental:  Intact    Chest/Lungs:  Clear to auscultation, Normal Respiratory Rate    Heart:  Rate: Normal  Rhythm: Regular Rhythm  Sounds: Normal        Anesthesia Plan  Type of Anesthesia, risks & benefits discussed:    Anesthesia Type: Gen Natural Airway  Intra-op Monitoring Plan: Standard ASA Monitors  Post Op Pain Control Plan: multimodal analgesia  Airway Plan: Direct  Informed Consent: Informed consent signed with the Patient and all parties understand the risks and agree with anesthesia plan.  All questions answered.   ASA Score: 2    Ready For Surgery From Anesthesia Perspective.     .

## 2023-07-27 NOTE — H&P
Short Stay Endoscopy History and Physical    PCP - Michelle F Midkiff, NP    Procedure - Colonoscopy  ASA - 2  Mallampati - per anesthesia  History of Anesthesia problems - no  Family history Anesthesia problems -  no     HPI:  This is a 62 y.o. female here for evaluation of :     Average Risk Screening: No  High risk screening: yes  History of polyps: yes  Anemia: No  Blood in stools: No  Diarrhea: No  Abdominal Pain: No    Review of Systems:  CONSTITUTIONAL: Denies weight change,  fatigue, fevers, chills, night sweats.  CARDIOVASCULAR: Denies chest pain, shortness of breath, orthopnea and edema.  RESPIRATORY: Denies cough, hemoptysis, dyspnea, and wheezing.  GI: See HPI.    Medical History:  Past Medical History:   Diagnosis Date    Cataract     Fibromyalgia     Hx of colonic polyps     Hyperlipidemia     Hypertension     Hyperthyroidism     Hypothyroidism     Irregular heart beat     Ophthalmic Graves disease        Surgical History:   Past Surgical History:   Procedure Laterality Date    BREAST BIOPSY      LEFT     SECTION, CLASSIC      COLONOSCOPY N/A 2016    Procedure: COLONOSCOPY;  Surgeon: Benny Menchaca MD;  Location: [address];  Service: Endoscopy;  Laterality: N/A;  requests Dr Menchaca    CYSTOSCOPY N/A 12/10/2018    Procedure: CYSTOSCOPY;  Surgeon: Pinky Ko DO;  Location: [facility];  Service: OB/GYN;  Laterality: N/A;    DILATION OF URETHRA N/A 12/10/2018    Procedure: DILATION, URETHRA;  Surgeon: Pinky Ko DO;  Location: [facility];  Service: OB/GYN;  Laterality: N/A;    ENTEROCELE REPAIR N/A 12/10/2018    Procedure: REPAIR, ENTEROCELE;  Surgeon: Pinky Ko DO;  Location: [facility];  Service: OB/GYN;  Laterality: N/A;    EYE SURGERY      LAPAROSCOPIC SALPINGO-OOPHORECTOMY Bilateral 12/10/2018    Procedure: SALPINGO-OOPHORECTOMY, LAPAROSCOPIC;  Surgeon: Pinky Ko DO;  Location: [facility];  Service: OB/GYN;  Laterality: Bilateral;     LAPAROSCOPIC TOTAL HYSTERECTOMY N/A 12/10/2018    Procedure: HYSTERECTOMY, TOTAL, LAPAROSCOPIC; excision of umbilical mole;  Surgeon: Pinky Ko DO;  Location: [facility] OR;  Service: OB/GYN;  Laterality: N/A;    lasik ou      POSTERIOR REPAIR N/A 12/10/2018    Procedure: COLPORRHAPHY, POSTERIOR;  Surgeon: Pinky Ko DO;  Location: [facility] OR;  Service: OB/GYN;  Laterality: N/A;    thyroid radiation      TUBAL LIGATION         Family History:   Family History   Problem Relation Age of Onset    Heart failure Mother     Thyroid disease Mother     Stroke Mother     Parkinsonism Father     Hypertension Maternal Grandmother     Stroke Maternal Grandmother     Diabetes Maternal Grandfather     Stroke Paternal Grandfather     Amblyopia Neg Hx     Blindness Neg Hx     Cancer Neg Hx     Cataracts Neg Hx     Glaucoma Neg Hx     Macular degeneration Neg Hx     Retinal detachment Neg Hx     Strabismus Neg Hx     Breast cancer Neg Hx     Colon cancer Neg Hx     Ovarian cancer Neg Hx        Social History:   Social History     Tobacco Use    Smoking status: Every Day     Types: Vaping with nicotine     Last attempt to quit: 2011     Years since quittin.0    Smokeless tobacco: Never   Substance Use Topics    Alcohol use: Yes     Alcohol/week: 0.0 standard drinks     Comment: SOCIALLY    Drug use: No       Allergies: Reviewed.    Medications:  No current facility-administered medications on file prior to encounter.     Current Outpatient Medications on File Prior to Encounter   Medication Sig Dispense Refill    diltiaZEM (CARDIZEM) 30 MG tablet every 12 (twelve) hours.   3    doxycycline (MONODOX) 100 MG capsule TK 1 TO 2 CS PO QD WF AND A TALL GLASS OF WATER      finasteride (PROSCAR) 5 mg tablet Take 5 mg by mouth once daily.      spironolactone (ALDACTONE) 25 MG tablet       thyroid, pork, (NP THYROID) 90 mg Tab Take 1 tablet by mouth Mon-Sat and skip on  90 tablet 3    clonazePAM (KLONOPIN) 0.5 MG  tablet Take 1 tablet (0.5 mg total) by mouth every evening. 30 tablet 0    diphth,pertus,acell,,tetanus (BOOSTRIX) 2.5-8-5 Lf-mcg-Lf/0.5mL Syrg injection Inject into the muscle. (Patient not taking: Reported on 6/28/2023) 0.5 mL 0    fluocinolone acetonide oil 0.01 % Drop GAL AA BID  2    hydrochlorothiazide (HYDRODIURIL) 25 MG tablet Take 1 tablet (25 mg total) by mouth once daily. 90 tablet 3    progesterone (PROMETRIUM) 200 MG capsule TAKE 1 CAPSULE(200 MG) BY MOUTH EVERY NIGHT 30 capsule 11       Physical Exam:  Vital Signs:   Vitals:    07/27/23 0718   BP: (!) 135/97   Pulse: 83   Resp: 18   Temp: 97.5 °F (36.4 °C)     General Appearance: Well appearing in no acute distress  ENT: OP clear  Chest: CTA B  CV: RRR, no m/r/g  Abd: s/nt/nd/nabs  Ext: no edema    Labs:  Reviewed    IMPRESSION:    Hx of polyps    Plan:  I have explained the risks and benefits of colonoscopy to the patient including but not limited to bleeding, perforation, infection, and death. The patient wishes to proceed with colonoscopy.

## 2023-07-27 NOTE — TRANSFER OF CARE
"Anesthesia Transfer of Care Note    Patient: Alma Jackson    Procedure(s) Performed: Procedure(s) (LRB):  COLONOSCOPY (N/A)    Patient location: GI    Anesthesia Type: general    Transport from OR: Transported from OR on room air with adequate spontaneous ventilation    Post pain: adequate analgesia    Post assessment: no apparent anesthetic complications and tolerated procedure well    Post vital signs: stable    Level of consciousness: awake, alert and oriented    Nausea/Vomiting: no nausea/vomiting    Complications: none    Transfer of care protocol was followed      Last vitals:   Visit Vitals  BP (!) 135/97 (BP Location: Left arm, Patient Position: Lying)   Pulse 83   Temp 36.4 °C (97.5 °F) (Temporal)   Resp 18   Ht 5' 3" (1.6 m)   Wt 68 kg (150 lb)   LMP  (LMP Unknown)   SpO2 99%   Breastfeeding No   BMI 26.57 kg/m²     "

## 2023-07-29 NOTE — ANESTHESIA POSTPROCEDURE EVALUATION
Anesthesia Post Evaluation    Patient: Alma Jackson    Procedure(s) Performed: Procedure(s) (LRB):  COLONOSCOPY (N/A)    Final Anesthesia Type: general      Patient location during evaluation: PACU  Patient participation: Yes- Able to Participate  Level of consciousness: awake and alert  Post-procedure vital signs: reviewed and stable  Pain control: Pain has been treated.  Airway patency: patent    PONV status: PONV absent or treated.  Anesthetic complications: no      Cardiovascular status: hemodynamically stable  Respiratory status: spontaneous ventilation  Hydration status: euvolemic  Follow-up not needed.          Vitals Value Taken Time   /82 07/27/23 0835   Temp 36.4 °C (97.6 °F) 07/27/23 0835   Pulse 82 07/27/23 0835   Resp 14 07/27/23 0835   SpO2 98 % 07/27/23 0835         Event Time   Out of Recovery 08:37:05         Pain/Bernardino Score: No data recorded

## 2023-07-31 ENCOUNTER — CLINICAL SUPPORT (OUTPATIENT)
Dept: OBSTETRICS AND GYNECOLOGY | Facility: CLINIC | Age: 63
End: 2023-07-31
Payer: COMMERCIAL

## 2023-07-31 DIAGNOSIS — N95.1 MENOPAUSAL SYMPTOMS: Primary | ICD-10-CM

## 2023-07-31 PROCEDURE — 96372 PR INJECTION,THERAP/PROPH/DIAG2ST, IM OR SUBCUT: ICD-10-PCS | Mod: S$GLB,,, | Performed by: OBSTETRICS & GYNECOLOGY

## 2023-07-31 PROCEDURE — 99999 PR PBB SHADOW E&M-EST. PATIENT-LVL I: ICD-10-PCS | Mod: PBBFAC,,,

## 2023-07-31 PROCEDURE — 96372 THER/PROPH/DIAG INJ SC/IM: CPT | Mod: S$GLB,,, | Performed by: OBSTETRICS & GYNECOLOGY

## 2023-07-31 PROCEDURE — 99999 PR PBB SHADOW E&M-EST. PATIENT-LVL I: CPT | Mod: PBBFAC,,,

## 2023-07-31 RX ORDER — ESTRADIOL VALERATE 40 MG/ML
10 INJECTION INTRAMUSCULAR
Status: SHIPPED | OUTPATIENT
Start: 2023-07-31 | End: 2023-11-28

## 2023-07-31 RX ADMIN — TESTOSTERONE CYPIONATE 76 MG: 200 INJECTION, SOLUTION INTRAMUSCULAR at 11:07

## 2023-07-31 RX ADMIN — ESTRADIOL VALERATE 10 MG: 40 INJECTION INTRAMUSCULAR at 11:07

## 2023-07-31 NOTE — PROGRESS NOTES
Here for hormone therapy injection, no complaints at this time. Injection given as ordered, tolerated well no reports of pain prior to or post injection. Advised to return to clinic as scheduled      Site:LB    Testerone:76 mg    Delestrogen: 10 mg   CLINIC SUPPLIED MEDICATION

## 2023-08-28 ENCOUNTER — CLINICAL SUPPORT (OUTPATIENT)
Dept: OBSTETRICS AND GYNECOLOGY | Facility: CLINIC | Age: 63
End: 2023-08-28
Payer: COMMERCIAL

## 2023-08-28 DIAGNOSIS — N95.1 MENOPAUSAL SYMPTOMS: Primary | ICD-10-CM

## 2023-08-28 PROCEDURE — 96372 THER/PROPH/DIAG INJ SC/IM: CPT | Mod: S$GLB,,, | Performed by: OBSTETRICS & GYNECOLOGY

## 2023-08-28 PROCEDURE — 99999 PR PBB SHADOW E&M-EST. PATIENT-LVL I: ICD-10-PCS | Mod: PBBFAC,,,

## 2023-08-28 PROCEDURE — 99999 PR PBB SHADOW E&M-EST. PATIENT-LVL I: CPT | Mod: PBBFAC,,,

## 2023-08-28 PROCEDURE — 96372 PR INJECTION,THERAP/PROPH/DIAG2ST, IM OR SUBCUT: ICD-10-PCS | Mod: S$GLB,,, | Performed by: OBSTETRICS & GYNECOLOGY

## 2023-08-28 RX ADMIN — TESTOSTERONE CYPIONATE 76 MG: 200 INJECTION, SOLUTION INTRAMUSCULAR at 02:08

## 2023-08-28 RX ADMIN — ESTRADIOL VALERATE 10 MG: 40 INJECTION INTRAMUSCULAR at 02:08

## 2023-08-28 NOTE — PROGRESS NOTES
Here for hormone therapy injection, no complaints at this time. Injection given as ordered, tolerated well no reports of pain prior to or post injections. Advised to return to clinic as scheduled .    Site: RB      Testosterone 76mg  DELESTROGEN 10MG    CLINIC SUPPLIED MEDICATION

## 2023-09-25 ENCOUNTER — CLINICAL SUPPORT (OUTPATIENT)
Dept: OBSTETRICS AND GYNECOLOGY | Facility: CLINIC | Age: 63
End: 2023-09-25
Payer: COMMERCIAL

## 2023-09-25 DIAGNOSIS — N95.1 MENOPAUSAL SYMPTOMS: Primary | ICD-10-CM

## 2023-09-25 PROCEDURE — 99999 PR PBB SHADOW E&M-EST. PATIENT-LVL I: CPT | Mod: PBBFAC,,,

## 2023-09-25 PROCEDURE — 96372 PR INJECTION,THERAP/PROPH/DIAG2ST, IM OR SUBCUT: ICD-10-PCS | Mod: S$GLB,,, | Performed by: OBSTETRICS & GYNECOLOGY

## 2023-09-25 PROCEDURE — 99999 PR PBB SHADOW E&M-EST. PATIENT-LVL I: ICD-10-PCS | Mod: PBBFAC,,,

## 2023-09-25 PROCEDURE — 96372 THER/PROPH/DIAG INJ SC/IM: CPT | Mod: S$GLB,,, | Performed by: OBSTETRICS & GYNECOLOGY

## 2023-09-25 RX ADMIN — ESTRADIOL VALERATE 10 MG: 40 INJECTION INTRAMUSCULAR at 08:09

## 2023-09-25 RX ADMIN — TESTOSTERONE CYPIONATE 76 MG: 200 INJECTION, SOLUTION INTRAMUSCULAR at 08:09

## 2023-09-25 NOTE — PROGRESS NOTES
Here for hormone therapy injection, no complaints at this time. Injection given as ordered, tolerated well no reports of pain prior to or post injection. Advised to return to clinic as scheduled      Site:LB    Testerone:76 mg    Delestrogen: 10mg    CLINIC SUPPLIED MEDICATION

## 2023-10-12 NOTE — TELEPHONE ENCOUNTER
----- Message from Marilyn Albarado sent at 9/25/2017  5:07 PM CDT -----  Contact: Pt   Pt says she really needs to see Dr Villegas she has been trying for 6 months to visit.       Pt can be reached at [phone]    Thanks   no

## 2023-10-23 ENCOUNTER — CLINICAL SUPPORT (OUTPATIENT)
Dept: OBSTETRICS AND GYNECOLOGY | Facility: CLINIC | Age: 63
End: 2023-10-23
Payer: COMMERCIAL

## 2023-10-23 DIAGNOSIS — N95.1 MENOPAUSAL SYMPTOMS: Primary | ICD-10-CM

## 2023-10-23 PROCEDURE — 96372 PR INJECTION,THERAP/PROPH/DIAG2ST, IM OR SUBCUT: ICD-10-PCS | Mod: S$GLB,,, | Performed by: OBSTETRICS & GYNECOLOGY

## 2023-10-23 PROCEDURE — 96372 THER/PROPH/DIAG INJ SC/IM: CPT | Mod: S$GLB,,, | Performed by: OBSTETRICS & GYNECOLOGY

## 2023-10-23 PROCEDURE — 99999 PR PBB SHADOW E&M-EST. PATIENT-LVL I: ICD-10-PCS | Mod: PBBFAC,,,

## 2023-10-23 PROCEDURE — 99999 PR PBB SHADOW E&M-EST. PATIENT-LVL I: CPT | Mod: PBBFAC,,,

## 2023-10-23 RX ADMIN — TESTOSTERONE CYPIONATE 76 MG: 200 INJECTION, SOLUTION INTRAMUSCULAR at 08:10

## 2023-10-23 NOTE — PROGRESS NOTES
Here for hormone therapy injection, no complaints at this time, Injection given as ordered, tolerated well, no report of pain prior to or after injection. Return to clinic as scheduled.     Site - RB    Testosterone   76 mg  Depo Estradiol   5 mg    Clinic Supplied Medication     Patient requested to go back to Depo Estradiol 5 mg due to Side effects of Breast tenderness from Delestrogen.    Will do labs after 3 injections to test lab levels.

## 2023-11-14 ENCOUNTER — OFFICE VISIT (OUTPATIENT)
Dept: OBSTETRICS AND GYNECOLOGY | Facility: CLINIC | Age: 63
End: 2023-11-14
Payer: COMMERCIAL

## 2023-11-14 VITALS — WEIGHT: 155 LBS | BODY MASS INDEX: 27.46 KG/M2 | HEIGHT: 63 IN

## 2023-11-14 DIAGNOSIS — N90.89 VULVAR LESION: Primary | ICD-10-CM

## 2023-11-14 PROCEDURE — 3008F PR BODY MASS INDEX (BMI) DOCUMENTED: ICD-10-PCS | Mod: CPTII,S$GLB,, | Performed by: ADVANCED PRACTICE MIDWIFE

## 2023-11-14 PROCEDURE — 1159F MED LIST DOCD IN RCRD: CPT | Mod: CPTII,S$GLB,, | Performed by: ADVANCED PRACTICE MIDWIFE

## 2023-11-14 PROCEDURE — 99214 OFFICE O/P EST MOD 30 MIN: CPT | Mod: S$GLB,,, | Performed by: ADVANCED PRACTICE MIDWIFE

## 2023-11-14 PROCEDURE — 87529 HSV DNA AMP PROBE: CPT | Mod: 59 | Performed by: ADVANCED PRACTICE MIDWIFE

## 2023-11-14 PROCEDURE — 99999 PR PBB SHADOW E&M-EST. PATIENT-LVL III: CPT | Mod: PBBFAC,,, | Performed by: ADVANCED PRACTICE MIDWIFE

## 2023-11-14 PROCEDURE — 1159F PR MEDICATION LIST DOCUMENTED IN MEDICAL RECORD: ICD-10-PCS | Mod: CPTII,S$GLB,, | Performed by: ADVANCED PRACTICE MIDWIFE

## 2023-11-14 PROCEDURE — 3008F BODY MASS INDEX DOCD: CPT | Mod: CPTII,S$GLB,, | Performed by: ADVANCED PRACTICE MIDWIFE

## 2023-11-14 PROCEDURE — 99214 PR OFFICE/OUTPT VISIT, EST, LEVL IV, 30-39 MIN: ICD-10-PCS | Mod: S$GLB,,, | Performed by: ADVANCED PRACTICE MIDWIFE

## 2023-11-14 PROCEDURE — 99999 PR PBB SHADOW E&M-EST. PATIENT-LVL III: ICD-10-PCS | Mod: PBBFAC,,, | Performed by: ADVANCED PRACTICE MIDWIFE

## 2023-11-14 RX ORDER — VALACYCLOVIR HYDROCHLORIDE 1 G/1
1000 TABLET, FILM COATED ORAL 2 TIMES DAILY
Qty: 20 TABLET | Refills: 0 | Status: SHIPPED | OUTPATIENT
Start: 2023-11-14 | End: 2024-01-12 | Stop reason: SDUPTHER

## 2023-11-14 RX ORDER — VALACYCLOVIR HYDROCHLORIDE 1 G/1
2000 TABLET, FILM COATED ORAL 2 TIMES DAILY
Qty: 4 TABLET | Refills: 0 | Status: SHIPPED | OUTPATIENT
Start: 2023-11-14 | End: 2023-11-14

## 2023-11-14 NOTE — PROGRESS NOTES
"Chief complaint: Vaginal bump     HPI: Pt reports having a vaginal bump that she noticed 10 days ago. She reports bump is on right inner side of the labia majora. The bump is exquisitely tender with palpation as well as with urination. She denies any drainage. She reports no recent changes/trauma, hair removal by shaving, or discharge. She reports another bump on her left labia; however, this bump is not as bothersome as the bump on her right labia. Treatments tried include daily neosporin ointment and wearing a panty liner daily to keep the area dry. These treatments have neither alleviated nor provided full resolution of her symptoms.     Ht 5' 3" (1.6 m)   Wt 70.3 kg (154 lb 15.7 oz)   LMP  (LMP Unknown)   BMI 27.45 kg/m²     ROS   Systemic: Not feeling tired (fatigue). No fever chills   Gastrointestinal: No nausea, vomiting, no abdominal pain. No diarrhea.  Genitourinary: No dysuria. No Pelvic Pain  Skin: No rash.    Physical Exam   Vital Signs:° Normal.  General Appearance:° Well developed.  ° Well nourished.  Genitalia: External: ° Right, inner portion of the labia showed a cluster of lesions on an erythematous base.  The left labia majora showed a small, 1 mm, inflamed hair follicle with a pustular head.  Vagina: ° Mucosa was normal.  °  No abnormal vaginal discharge was observed--  Neurological:° No disorientation was observed.  Psychiatric:Affect: ° Normal.  Skin:° No skin lesions.  ° No perineal lesions.    Assessment/Plan:  1. Vaginal Ulcer resembling HSV--culture ordered today and start Valtrex as ordered.  Long discussion about HSV/prevention/treatment/options along with handout. Will call with changes in plan and results     Discussed the  etiology and mgmt of herpes virus with the patient today. We discussed that outbreak recurrence, which is generally benign, is treatable with medications such as Valtrex. Explained to the patient that though transmission is more likely during acute outbreaks and " the prodromal phase, it is still possible to shed the virus and thus spread the disease when she is asymptomatic. Recommended avoidance of intercourse during symptomatic periods, and condom use even during asymptomatic periods to decrease the chance of partner infection.  All of her questions were answered and she voiced understanding.      RTC prn and as scheduled for annual exam.    NADER Pratt-S2    I have seen the patient, reviewed the  PA student's  assessment, plan, and progress note. I have personally interviewed and examined the patient at bedside and: agree with the findings.. Date or Service: 11/14/2023        Alice Marcial CNM  Obstetrics

## 2023-11-15 ENCOUNTER — TELEPHONE (OUTPATIENT)
Dept: OBSTETRICS AND GYNECOLOGY | Facility: CLINIC | Age: 63
End: 2023-11-15
Payer: COMMERCIAL

## 2023-11-16 LAB
HSV1 DNA SPEC QL NAA+PROBE: NEGATIVE
HSV2 DNA SPEC QL NAA+PROBE: POSITIVE
SPECIMEN SOURCE: ABNORMAL

## 2023-11-20 ENCOUNTER — CLINICAL SUPPORT (OUTPATIENT)
Dept: OBSTETRICS AND GYNECOLOGY | Facility: CLINIC | Age: 63
End: 2023-11-20
Payer: COMMERCIAL

## 2023-11-20 ENCOUNTER — PATIENT MESSAGE (OUTPATIENT)
Dept: OBSTETRICS AND GYNECOLOGY | Facility: CLINIC | Age: 63
End: 2023-11-20
Payer: COMMERCIAL

## 2023-11-20 DIAGNOSIS — N95.1 MENOPAUSAL SYMPTOMS: Primary | ICD-10-CM

## 2023-11-20 PROCEDURE — 99999 PR PBB SHADOW E&M-EST. PATIENT-LVL I: ICD-10-PCS | Mod: PBBFAC,,,

## 2023-11-20 PROCEDURE — 96372 THER/PROPH/DIAG INJ SC/IM: CPT | Mod: S$GLB,,, | Performed by: OBSTETRICS & GYNECOLOGY

## 2023-11-20 PROCEDURE — 96372 PR INJECTION,THERAP/PROPH/DIAG2ST, IM OR SUBCUT: ICD-10-PCS | Mod: S$GLB,,, | Performed by: OBSTETRICS & GYNECOLOGY

## 2023-11-20 PROCEDURE — 99999 PR PBB SHADOW E&M-EST. PATIENT-LVL I: CPT | Mod: PBBFAC,,,

## 2023-11-20 RX ORDER — TESTOSTERONE CYPIONATE 200 MG/ML
76 INJECTION, SOLUTION INTRAMUSCULAR
Status: COMPLETED | OUTPATIENT
Start: 2023-11-20 | End: 2023-12-18

## 2023-11-20 RX ADMIN — TESTOSTERONE CYPIONATE 76 MG: 200 INJECTION, SOLUTION INTRAMUSCULAR at 10:11

## 2023-12-18 ENCOUNTER — CLINICAL SUPPORT (OUTPATIENT)
Dept: OBSTETRICS AND GYNECOLOGY | Facility: CLINIC | Age: 63
End: 2023-12-18
Payer: COMMERCIAL

## 2023-12-18 DIAGNOSIS — N95.1 MENOPAUSAL SYMPTOMS: Primary | ICD-10-CM

## 2023-12-18 PROCEDURE — 99999 PR PBB SHADOW E&M-EST. PATIENT-LVL I: CPT | Mod: PBBFAC,,,

## 2023-12-18 PROCEDURE — 96372 THER/PROPH/DIAG INJ SC/IM: CPT | Mod: S$GLB,,, | Performed by: OBSTETRICS & GYNECOLOGY

## 2023-12-18 PROCEDURE — 99999 PR PBB SHADOW E&M-EST. PATIENT-LVL I: ICD-10-PCS | Mod: PBBFAC,,,

## 2023-12-18 PROCEDURE — 96372 PR INJECTION,THERAP/PROPH/DIAG2ST, IM OR SUBCUT: ICD-10-PCS | Mod: S$GLB,,, | Performed by: OBSTETRICS & GYNECOLOGY

## 2023-12-18 RX ADMIN — TESTOSTERONE CYPIONATE 76 MG: 200 INJECTION, SOLUTION INTRAMUSCULAR at 08:12

## 2024-01-12 DIAGNOSIS — N90.89 VULVAR LESION: ICD-10-CM

## 2024-01-12 RX ORDER — VALACYCLOVIR HYDROCHLORIDE 1 G/1
1000 TABLET, FILM COATED ORAL 2 TIMES DAILY
Qty: 20 TABLET | Refills: 0 | Status: SHIPPED | OUTPATIENT
Start: 2024-01-12 | End: 2024-01-22

## 2024-01-16 ENCOUNTER — CLINICAL SUPPORT (OUTPATIENT)
Dept: OBSTETRICS AND GYNECOLOGY | Facility: CLINIC | Age: 64
End: 2024-01-16
Payer: COMMERCIAL

## 2024-01-16 ENCOUNTER — PATIENT MESSAGE (OUTPATIENT)
Dept: OBSTETRICS AND GYNECOLOGY | Facility: CLINIC | Age: 64
End: 2024-01-16

## 2024-01-16 DIAGNOSIS — N95.1 MENOPAUSAL SYMPTOMS: Primary | ICD-10-CM

## 2024-01-16 DIAGNOSIS — Z86.19 HISTORY OF HERPES GENITALIS: Primary | ICD-10-CM

## 2024-01-16 PROCEDURE — 96372 THER/PROPH/DIAG INJ SC/IM: CPT | Mod: S$GLB,,, | Performed by: OBSTETRICS & GYNECOLOGY

## 2024-01-16 PROCEDURE — 99999 PR PBB SHADOW E&M-EST. PATIENT-LVL I: CPT | Mod: PBBFAC,,,

## 2024-01-16 RX ORDER — TESTOSTERONE CYPIONATE 200 MG/ML
76 INJECTION, SOLUTION INTRAMUSCULAR
Status: COMPLETED | OUTPATIENT
Start: 2024-01-16 | End: 2024-03-11

## 2024-01-16 RX ORDER — VALACYCLOVIR HYDROCHLORIDE 1 G/1
1000 TABLET, FILM COATED ORAL DAILY
Qty: 60 TABLET | Refills: 3 | Status: SHIPPED | OUTPATIENT
Start: 2024-01-16 | End: 2024-02-14 | Stop reason: SDUPTHER

## 2024-01-16 RX ADMIN — TESTOSTERONE CYPIONATE 76 MG: 200 INJECTION, SOLUTION INTRAMUSCULAR at 09:01

## 2024-01-16 NOTE — TELEPHONE ENCOUNTER
Received message :   Good afternoon, I was diagnosed with Genital Herpes at [facility]'s Walk in, they gave me a 2 day supply and told me to report to my primary who put a 10 regimen, with 1 refill. I took the 10 days,, started with another outbreak did that again for 10 days, Last dose 2.5 weeks ago, and now I have another sore I got Friday. Could you please prescribe another round with refills and any direction you think is needed. I will see you in February.     Will begin daily prophylaxis to prevent frequent recurrences.

## 2024-02-06 ENCOUNTER — LAB VISIT (OUTPATIENT)
Dept: LAB | Facility: HOSPITAL | Age: 64
End: 2024-02-06
Attending: OBSTETRICS & GYNECOLOGY
Payer: COMMERCIAL

## 2024-02-06 ENCOUNTER — APPOINTMENT (OUTPATIENT)
Dept: RADIOLOGY | Facility: CLINIC | Age: 64
End: 2024-02-06
Attending: INTERNAL MEDICINE
Payer: COMMERCIAL

## 2024-02-06 DIAGNOSIS — M85.80 OSTEOPENIA, UNSPECIFIED LOCATION: ICD-10-CM

## 2024-02-06 DIAGNOSIS — E55.9 VITAMIN D DEFICIENCY: ICD-10-CM

## 2024-02-06 DIAGNOSIS — N95.1 MENOPAUSAL SYMPTOMS: ICD-10-CM

## 2024-02-06 DIAGNOSIS — E89.0 POSTABLATIVE HYPOTHYROIDISM: ICD-10-CM

## 2024-02-06 LAB — ESTRADIOL SERPL-MCNC: 47 PG/ML

## 2024-02-06 PROCEDURE — 77080 DXA BONE DENSITY AXIAL: CPT | Mod: 26,,, | Performed by: INTERNAL MEDICINE

## 2024-02-06 PROCEDURE — 77080 DXA BONE DENSITY AXIAL: CPT | Mod: TC,PO

## 2024-02-06 PROCEDURE — 84402 ASSAY OF FREE TESTOSTERONE: CPT | Performed by: OBSTETRICS & GYNECOLOGY

## 2024-02-06 PROCEDURE — 82670 ASSAY OF TOTAL ESTRADIOL: CPT | Performed by: OBSTETRICS & GYNECOLOGY

## 2024-02-14 ENCOUNTER — CLINICAL SUPPORT (OUTPATIENT)
Dept: OBSTETRICS AND GYNECOLOGY | Facility: CLINIC | Age: 64
End: 2024-02-14
Payer: COMMERCIAL

## 2024-02-14 ENCOUNTER — OFFICE VISIT (OUTPATIENT)
Dept: OBSTETRICS AND GYNECOLOGY | Facility: CLINIC | Age: 64
End: 2024-02-14
Attending: OBSTETRICS & GYNECOLOGY
Payer: COMMERCIAL

## 2024-02-14 VITALS
HEIGHT: 63 IN | WEIGHT: 151 LBS | BODY MASS INDEX: 26.75 KG/M2 | SYSTOLIC BLOOD PRESSURE: 148 MMHG | DIASTOLIC BLOOD PRESSURE: 88 MMHG

## 2024-02-14 DIAGNOSIS — Z86.19 HISTORY OF HERPES GENITALIS: ICD-10-CM

## 2024-02-14 DIAGNOSIS — N95.1 MENOPAUSAL SYMPTOMS: Primary | ICD-10-CM

## 2024-02-14 LAB — TESTOST FREE SERPL-MCNC: 2.1 PG/ML

## 2024-02-14 PROCEDURE — 3008F BODY MASS INDEX DOCD: CPT | Mod: CPTII,S$GLB,, | Performed by: OBSTETRICS & GYNECOLOGY

## 2024-02-14 PROCEDURE — 99213 OFFICE O/P EST LOW 20 MIN: CPT | Mod: 25,S$GLB,, | Performed by: OBSTETRICS & GYNECOLOGY

## 2024-02-14 PROCEDURE — 99999 PR PBB SHADOW E&M-EST. PATIENT-LVL III: CPT | Mod: PBBFAC,,, | Performed by: OBSTETRICS & GYNECOLOGY

## 2024-02-14 PROCEDURE — 3077F SYST BP >= 140 MM HG: CPT | Mod: CPTII,S$GLB,, | Performed by: OBSTETRICS & GYNECOLOGY

## 2024-02-14 PROCEDURE — 1101F PT FALLS ASSESS-DOCD LE1/YR: CPT | Mod: CPTII,S$GLB,, | Performed by: OBSTETRICS & GYNECOLOGY

## 2024-02-14 PROCEDURE — 3288F FALL RISK ASSESSMENT DOCD: CPT | Mod: CPTII,S$GLB,, | Performed by: OBSTETRICS & GYNECOLOGY

## 2024-02-14 PROCEDURE — 1126F AMNT PAIN NOTED NONE PRSNT: CPT | Mod: CPTII,S$GLB,, | Performed by: OBSTETRICS & GYNECOLOGY

## 2024-02-14 PROCEDURE — 96372 THER/PROPH/DIAG INJ SC/IM: CPT | Mod: S$GLB,,, | Performed by: OBSTETRICS & GYNECOLOGY

## 2024-02-14 PROCEDURE — 1159F MED LIST DOCD IN RCRD: CPT | Mod: CPTII,S$GLB,, | Performed by: OBSTETRICS & GYNECOLOGY

## 2024-02-14 PROCEDURE — 3079F DIAST BP 80-89 MM HG: CPT | Mod: CPTII,S$GLB,, | Performed by: OBSTETRICS & GYNECOLOGY

## 2024-02-14 PROCEDURE — 99999 PR PBB SHADOW E&M-EST. PATIENT-LVL I: CPT | Mod: PBBFAC,,,

## 2024-02-14 RX ORDER — NAPROXEN 500 MG/1
500 TABLET ORAL 2 TIMES DAILY
COMMUNITY
Start: 2023-11-28

## 2024-02-14 RX ORDER — CALCIUM CARBONATE/VITAMIN D3 250-3.125
TABLET ORAL DAILY
COMMUNITY

## 2024-02-14 RX ORDER — VALACYCLOVIR HYDROCHLORIDE 1 G/1
1000 TABLET, FILM COATED ORAL EVERY 12 HOURS
Qty: 60 TABLET | Refills: 6 | Status: SHIPPED | OUTPATIENT
Start: 2024-02-14

## 2024-02-14 RX ORDER — DOXYCYCLINE 100 MG/1
100 CAPSULE ORAL 2 TIMES DAILY
COMMUNITY
Start: 2024-02-09

## 2024-02-14 RX ADMIN — TESTOSTERONE CYPIONATE 76 MG: 200 INJECTION, SOLUTION INTRAMUSCULAR at 02:02

## 2024-02-14 NOTE — PROGRESS NOTES
"  Subjective:       Patient ID: Alma Jackson is a 63 y.o. female.    Chief Complaint:  Lesion      History of Present Illness  HPI  This 62 y/o P3 presents today for followup of a lesion on her vulva which has recurred.  SHe initially developed a painful lesion on her Right labia for which she was seen at Urgent Care on 2023 and had a diagnosis confirmed of HSV Type II . She was treated with Valtrex bid  x 2 weeks. Her message on :" they gave me a 2 day supply and told me to report to my primary who put a 10 regimen, with 1 refill. I took the 10 days,, started with another outbreak did that again for 10 days, Last dose 2.5 weeks ago, and now I have another sore I got Friday. " She has been unable to be intimate with her spouse due to fears of transmission since she now has difficulty telling when the lesion is present and has had several recurrences since first diagnosed in November.     GYN & OB History  No LMP recorded (lmp unknown). Patient has had a hysterectomy.   Date of Last Pap: No result found    OB History    Para Term  AB Living   3 3 3         SAB IAB Ectopic Multiple Live Births                  # Outcome Date GA Lbr Arvind/2nd Weight Sex Delivery Anes PTL Lv   3 Term            2 Term            1 Term                Past Medical History:   Diagnosis Date    Cataract     Fibromyalgia     Hx of colonic polyps     Hyperlipidemia     Hypertension     Hyperthyroidism     Hypothyroidism     Irregular heart beat     Ophthalmic Graves disease        Past Surgical History:   Procedure Laterality Date    BREAST BIOPSY      LEFT     SECTION, CLASSIC      COLONOSCOPY N/A 2016    Procedure: COLONOSCOPY;  Surgeon: Benny Menchaca MD;  Location: [facility] ([address] [place] [address]);  Service: Endoscopy;  Laterality: N/A;  requests Dr Menchaca    COLONOSCOPY N/A 2023    Procedure: COLONOSCOPY;  Surgeon: Saud Ballard MD;  [facility]  Service: " "Endoscopy;  Laterality: N/A;  Ref by Dr SEAMUS Alicia, pt request Suprep, instr via portal - PC    CYSTOSCOPY N/A 12/10/2018    Procedure: CYSTOSCOPY;  Surgeon: Pinky Ko DO;  Location: [facility] OR;  Service: OB/GYN;  Laterality: N/A;    DILATION OF URETHRA N/A 12/10/2018    Procedure: DILATION, URETHRA;  Surgeon: Pinky Ko DO;  Location: [facility] OR;  Service: OB/GYN;  Laterality: N/A;    ENTEROCELE REPAIR N/A 12/10/2018    Procedure: REPAIR, ENTEROCELE;  Surgeon: Pinky Ko DO;  Location: [facility] OR;  Service: OB/GYN;  Laterality: N/A;    EYE SURGERY      LAPAROSCOPIC SALPINGO-OOPHORECTOMY Bilateral 12/10/2018    Procedure: SALPINGO-OOPHORECTOMY, LAPAROSCOPIC;  Surgeon: Pinky Ko DO;  Location: [facility] OR;  Service: OB/GYN;  Laterality: Bilateral;    LAPAROSCOPIC TOTAL HYSTERECTOMY N/A 12/10/2018    Procedure: HYSTERECTOMY, TOTAL, LAPAROSCOPIC; excision of umbilical mole;  Surgeon: Pinky Ko DO;  Location: [facility] OR;  Service: OB/GYN;  Laterality: N/A;    lasik ou      POSTERIOR REPAIR N/A 12/10/2018    Procedure: COLPORRHAPHY, POSTERIOR;  Surgeon: Pinky Ko DO;  Location: [facility] OR;  Service: OB/GYN;  Laterality: N/A;    thyroid radiation      TUBAL LIGATION         Review of Systems  Review of Systems   Genitourinary:  Positive for genital sores.           Objective:      BP (!) 148/88   Ht 5' 3" (1.6 m)   Wt 68.5 kg (151 lb)   LMP  (LMP Unknown)   BMI 26.75 kg/m²   Physical Exam:               Genitourinary:          Pelvic exam was performed with patient in the lithotomy position.                            Assessment:        1. History of herpes genitalis                Plan:      Problem List Items Addressed This Visit    None  Visit Diagnoses       History of herpes genitalis        The nature of herpes genitalis is fully explained. It is an incurable recurrent disease, which is generally benign, and treatable with medications such as Zovirax, " Famvir or Valtrex. It is rare, but not impossible, to transmit the virus while in an asymptomatic stage; but highly likely to transmit during acute symptoms; thus avoidance of sex during symptomatic phases is best. The use of a condom between spouses during asymptomatic phases is a personal but not mandatory choice. The patient indicates understanding of these issues. The current recommendation is to use medication at the time of outbreak for the relief of symptoms unless she has frequent outbreaks or is in a relationship with an unaffected partner.  In those scenarios she should use daily suppression to decrease recurrence and to decrease the risk of transmission.      Relevant Medications    valACYclovir (VALTREX) 1000 MG tablet( use daily x 5 days, then take daily for prophylaxis             Follow up if symptoms worsen or fail to improve.

## 2024-03-01 DIAGNOSIS — E89.0 POSTABLATIVE HYPOTHYROIDISM: ICD-10-CM

## 2024-03-01 RX ORDER — THYROID 90 MG/1
TABLET ORAL
Qty: 90 TABLET | Refills: 3 | Status: SHIPPED | OUTPATIENT
Start: 2024-03-01

## 2024-03-11 ENCOUNTER — CLINICAL SUPPORT (OUTPATIENT)
Dept: OBSTETRICS AND GYNECOLOGY | Facility: CLINIC | Age: 64
End: 2024-03-11
Payer: COMMERCIAL

## 2024-03-11 DIAGNOSIS — N95.1 MENOPAUSAL SYMPTOMS: Primary | ICD-10-CM

## 2024-03-11 PROCEDURE — 99999 PR PBB SHADOW E&M-EST. PATIENT-LVL I: CPT | Mod: PBBFAC,,,

## 2024-03-11 PROCEDURE — 96372 THER/PROPH/DIAG INJ SC/IM: CPT | Mod: S$GLB,,, | Performed by: OBSTETRICS & GYNECOLOGY

## 2024-03-11 RX ADMIN — TESTOSTERONE CYPIONATE 76 MG: 200 INJECTION, SOLUTION INTRAMUSCULAR at 08:03

## 2024-04-08 ENCOUNTER — CLINICAL SUPPORT (OUTPATIENT)
Dept: OBSTETRICS AND GYNECOLOGY | Facility: CLINIC | Age: 64
End: 2024-04-08
Payer: COMMERCIAL

## 2024-04-08 DIAGNOSIS — N95.1 MENOPAUSAL SYMPTOMS: Primary | ICD-10-CM

## 2024-04-08 PROCEDURE — 96372 THER/PROPH/DIAG INJ SC/IM: CPT | Mod: S$GLB,,, | Performed by: OBSTETRICS & GYNECOLOGY

## 2024-04-08 PROCEDURE — 99999 PR PBB SHADOW E&M-EST. PATIENT-LVL I: CPT | Mod: PBBFAC,,,

## 2024-04-08 RX ORDER — TESTOSTERONE CYPIONATE 200 MG/ML
76 INJECTION, SOLUTION INTRAMUSCULAR
Status: SHIPPED | OUTPATIENT
Start: 2024-04-08 | End: 2024-09-23

## 2024-04-08 RX ADMIN — TESTOSTERONE CYPIONATE 76 MG: 200 INJECTION, SOLUTION INTRAMUSCULAR at 09:04

## 2024-05-06 ENCOUNTER — CLINICAL SUPPORT (OUTPATIENT)
Dept: OBSTETRICS AND GYNECOLOGY | Facility: CLINIC | Age: 64
End: 2024-05-06
Payer: COMMERCIAL

## 2024-05-06 DIAGNOSIS — N95.1 MENOPAUSAL SYMPTOMS: Primary | ICD-10-CM

## 2024-05-06 PROCEDURE — 99999 PR PBB SHADOW E&M-EST. PATIENT-LVL I: CPT | Mod: PBBFAC,,,

## 2024-05-06 PROCEDURE — 96372 THER/PROPH/DIAG INJ SC/IM: CPT | Mod: S$GLB,,, | Performed by: OBSTETRICS & GYNECOLOGY

## 2024-05-06 RX ADMIN — TESTOSTERONE CYPIONATE 76 MG: 200 INJECTION, SOLUTION INTRAMUSCULAR at 09:05

## 2024-05-07 NOTE — PRE ADMISSION SCREENING
Lab results sent to PCP, Dr. Jara per patient's request.   What Is The Reason For Today's Visit?: Upper Body Skin Exam

## 2024-06-06 ENCOUNTER — CLINICAL SUPPORT (OUTPATIENT)
Dept: OBSTETRICS AND GYNECOLOGY | Facility: CLINIC | Age: 64
End: 2024-06-06
Payer: COMMERCIAL

## 2024-06-06 DIAGNOSIS — Z12.31 SCREENING MAMMOGRAM, ENCOUNTER FOR: ICD-10-CM

## 2024-06-06 DIAGNOSIS — N95.1 MENOPAUSAL SYMPTOMS: Primary | ICD-10-CM

## 2024-06-06 PROCEDURE — 99999 PR PBB SHADOW E&M-EST. PATIENT-LVL I: CPT | Mod: PBBFAC,,,

## 2024-06-06 PROCEDURE — 96372 THER/PROPH/DIAG INJ SC/IM: CPT | Mod: S$GLB,,, | Performed by: OBSTETRICS & GYNECOLOGY

## 2024-06-06 RX ADMIN — TESTOSTERONE CYPIONATE 76 MG: 200 INJECTION, SOLUTION INTRAMUSCULAR at 09:06

## 2024-06-20 ENCOUNTER — OFFICE VISIT (OUTPATIENT)
Dept: OBSTETRICS AND GYNECOLOGY | Facility: CLINIC | Age: 64
End: 2024-06-20
Attending: OBSTETRICS & GYNECOLOGY
Payer: COMMERCIAL

## 2024-06-20 VITALS
SYSTOLIC BLOOD PRESSURE: 138 MMHG | HEIGHT: 63 IN | WEIGHT: 149.38 LBS | DIASTOLIC BLOOD PRESSURE: 93 MMHG | BODY MASS INDEX: 26.47 KG/M2

## 2024-06-20 DIAGNOSIS — R39.15 URINARY URGENCY: ICD-10-CM

## 2024-06-20 DIAGNOSIS — N89.8 VAGINAL IRRITATION: ICD-10-CM

## 2024-06-20 DIAGNOSIS — Z01.419 ENCOUNTER FOR GYNECOLOGICAL EXAMINATION WITHOUT ABNORMAL FINDING: Primary | ICD-10-CM

## 2024-06-20 DIAGNOSIS — Z79.890 POSTMENOPAUSAL HORMONE REPLACEMENT THERAPY: ICD-10-CM

## 2024-06-20 DIAGNOSIS — Z12.31 SCREENING MAMMOGRAM, ENCOUNTER FOR: ICD-10-CM

## 2024-06-20 PROCEDURE — 1160F RVW MEDS BY RX/DR IN RCRD: CPT | Mod: CPTII,S$GLB,, | Performed by: OBSTETRICS & GYNECOLOGY

## 2024-06-20 PROCEDURE — 3075F SYST BP GE 130 - 139MM HG: CPT | Mod: CPTII,S$GLB,, | Performed by: OBSTETRICS & GYNECOLOGY

## 2024-06-20 PROCEDURE — 87086 URINE CULTURE/COLONY COUNT: CPT | Performed by: OBSTETRICS & GYNECOLOGY

## 2024-06-20 PROCEDURE — 99999 PR PBB SHADOW E&M-EST. PATIENT-LVL IV: CPT | Mod: PBBFAC,,, | Performed by: OBSTETRICS & GYNECOLOGY

## 2024-06-20 PROCEDURE — 3288F FALL RISK ASSESSMENT DOCD: CPT | Mod: CPTII,S$GLB,, | Performed by: OBSTETRICS & GYNECOLOGY

## 2024-06-20 PROCEDURE — 81514 NFCT DS BV&VAGINITIS DNA ALG: CPT | Performed by: OBSTETRICS & GYNECOLOGY

## 2024-06-20 PROCEDURE — 1126F AMNT PAIN NOTED NONE PRSNT: CPT | Mod: CPTII,S$GLB,, | Performed by: OBSTETRICS & GYNECOLOGY

## 2024-06-20 PROCEDURE — 99397 PER PM REEVAL EST PAT 65+ YR: CPT | Mod: S$GLB,,, | Performed by: OBSTETRICS & GYNECOLOGY

## 2024-06-20 PROCEDURE — 3008F BODY MASS INDEX DOCD: CPT | Mod: CPTII,S$GLB,, | Performed by: OBSTETRICS & GYNECOLOGY

## 2024-06-20 PROCEDURE — 1101F PT FALLS ASSESS-DOCD LE1/YR: CPT | Mod: CPTII,S$GLB,, | Performed by: OBSTETRICS & GYNECOLOGY

## 2024-06-20 PROCEDURE — 1159F MED LIST DOCD IN RCRD: CPT | Mod: CPTII,S$GLB,, | Performed by: OBSTETRICS & GYNECOLOGY

## 2024-06-20 PROCEDURE — 3080F DIAST BP >= 90 MM HG: CPT | Mod: CPTII,S$GLB,, | Performed by: OBSTETRICS & GYNECOLOGY

## 2024-06-20 RX ORDER — BISMUTH SUBCITRATE POTASSIUM, METRONIDAZOLE, TETRACYCLINE HYDROCHLORIDE 140; 125; 125 MG/1; MG/1; MG/1
3 CAPSULE ORAL 4 TIMES DAILY
COMMUNITY
Start: 2024-06-06

## 2024-06-20 RX ORDER — PANTOPRAZOLE SODIUM 40 MG/1
40 TABLET, DELAYED RELEASE ORAL DAILY
COMMUNITY
Start: 2024-06-06

## 2024-06-20 RX ORDER — LEVOFLOXACIN 500 MG/1
500 TABLET, FILM COATED ORAL DAILY
COMMUNITY
Start: 2024-05-10 | End: 2024-06-20

## 2024-06-20 NOTE — PROGRESS NOTES
Subjective:       Patient ID: Alma Jackson is a 63 y.o. female.    Chief Complaint:  Annual Exam and Gynecologic Exam      History of Present Illness  Gynecologic Exam  Associated symptoms include frequency and urgency. Pertinent negatives include no abdominal pain, back pain or headaches.     Alma Jackson is a 63 y.o. female  here for her annual GYN exam.     She is menopausal since 44 and had a TLHBSO in 2018. Has been on HRT, currently doing well on her Regimen of   DepoEstradiol 5mg  DepoTestost 76mg    Monthly.  Most recent labs:  2024:  E: 47, Free T: 2.1  with prior injection on  (3 weeks prior)  Feels well on this and wants to continue    She is up to date with her PCP and labs.  Sees Cardiologist, scheduled for July.    reports vaginal itching or irritation.(Feels tingly lately, like an infection beginning)  Denies vaginal discharge.  She is sexually active. She has had1 partner for 8 years .   History of abnormal pap: No  Last Pap:  had hysterectomy  Last MMG: normal--routine follow-up in 12 months  Last Colonoscopy:  colonoscopy 1 years ago with abnormalities.  denies domestic violence. She does feel safe at home.     Past Medical History:   Diagnosis Date    Cataract     Fibromyalgia     Hx of colonic polyps     Hyperlipidemia     Hypertension     Hyperthyroidism     Hypothyroidism     Irregular heart beat     Ophthalmic Graves disease      Past Surgical History:   Procedure Laterality Date    BREAST BIOPSY      LEFT     SECTION, CLASSIC      COLONOSCOPY N/A 2016    Procedure: COLONOSCOPY;  Surgeon: Benny Menchaca MD;  Location: [address]);  Service: Endoscopy;  Laterality: N/A;  requests Dr Menchaca    COLONOSCOPY N/A 2023    Procedure: COLONOSCOPY;  Surgeon: Saud Ballard MD;  Location: [address]);  Service: Endoscopy;  Laterality: N/A;  Ref by Dr SEAMUS Alicia, pt request Suprep, instr via portal - PC    CYSTOSCOPY N/A  12/10/2018    Procedure: CYSTOSCOPY;  Surgeon: Pinky Ko DO;  Location: [facility] OR;  Service: OB/GYN;  Laterality: N/A;    DILATION OF URETHRA N/A 12/10/2018    Procedure: DILATION, URETHRA;  Surgeon: Pinky Ko DO;  Location: [facility] OR;  Service: OB/GYN;  Laterality: N/A;    ENTEROCELE REPAIR N/A 12/10/2018    Procedure: REPAIR, ENTEROCELE;  Surgeon: Pinky Ko DO;  Location: [facility] OR;  Service: OB/GYN;  Laterality: N/A;    EYE SURGERY      LAPAROSCOPIC SALPINGO-OOPHORECTOMY Bilateral 12/10/2018    Procedure: SALPINGO-OOPHORECTOMY, LAPAROSCOPIC;  Surgeon: Pinky Ko DO;  Location: [facility] OR;  Service: OB/GYN;  Laterality: Bilateral;    LAPAROSCOPIC TOTAL HYSTERECTOMY N/A 12/10/2018    Procedure: HYSTERECTOMY, TOTAL, LAPAROSCOPIC; excision of umbilical mole;  Surgeon: Pinky Ko DO;  Location: [facility] OR;  Service: OB/GYN;  Laterality: N/A;    lasik ou      POSTERIOR REPAIR N/A 12/10/2018    Procedure: COLPORRHAPHY, POSTERIOR;  Surgeon: Pinky Ko DO;  Location: [facility] OR;  Service: OB/GYN;  Laterality: N/A;    thyroid radiation      TUBAL LIGATION       Social History     Socioeconomic History    Marital status: Significant Other   Tobacco Use    Smoking status: Every Day     Types: Vaping with nicotine     Last attempt to quit: 2011     Years since quittin.9    Smokeless tobacco: Never   Substance and Sexual Activity    Alcohol use: Yes     Alcohol/week: 0.0 standard drinks of alcohol     Comment: SOCIALLY    Drug use: No    Sexual activity: Yes     Partners: Male     Birth control/protection: None     Comment: partner since 2016, almost 8 years     Social Determinants of Health     Financial Resource Strain: Low Risk  (2021)    Received from [facility], [facility]    Overall Financial Resource Strain (CARDIA)     Difficulty of Paying Living Expenses: Not hard at all   Food Insecurity: No Food Insecurity (2021)    Received from [facility], [facility]    Hunger Vital Sign     Worried About Running Out of Food in the Last Year: Never true     Ran Out of Food in the Last Year: Never true   Transportation Needs: No Transportation Needs (2021)    Received from [facility] [employer] [facility]    PRAPARE - Transportation     Lack of Transportation (Medical): No     Lack of Transportation (Non-Medical): No   Physical Activity: Insufficiently Active (2021)    Received from [facility] [employer] [facility]    Exercise Vital Sign     Days of Exercise per Week: 2 days     Minutes of Exercise per Session: 60 min   Stress: Stress Concern Present (2021)    Received from [facility] [employer] [facility]    [employer] - Occupational Stress Questionnaire     Feeling of Stress : Very much     Family History   Problem Relation Name Age of Onset    Heart failure Mother      Thyroid disease Mother      Stroke Mother      Parkinsonism Father      Hypertension Maternal Grandmother      Stroke Maternal Grandmother      Diabetes Maternal Grandfather      Stroke Paternal Grandfather      Amblyopia Neg Hx      Blindness Neg Hx      Cancer Neg Hx      Cataracts Neg Hx      Glaucoma Neg Hx      Macular degeneration Neg Hx      Retinal detachment Neg Hx      Strabismus Neg Hx      Breast cancer Neg Hx      Colon cancer Neg Hx      Ovarian cancer Neg Hx       OB History          4    Para   2    Term   2            AB   2    Living   2         SAB   2    IAB        Ectopic        Multiple        Live Births   2                 BP (!) 138/93   Ht 5' 3" (1.6 m)   Wt 67.8 kg (149 lb 6.4 oz)   LMP  (LMP Unknown)   BMI 26.47 kg/m²         GYN & OB History    Date of Last Pap: Last Colonoscopy completed on     OB History    Para Term  AB Living   4 2 2   2 2   SAB IAB Ectopic Multiple Live Births   2       2      # Outcome Date GA Lbr Arvind/2nd Weight Sex Type Anes PTL Lv   4 SAB            3 Term      Vag-Spont   NEAL   2 Term   "    CS-LTranv   NEAL   1 SAB                Review of Systems  Review of Systems   Constitutional:  Negative for activity change, appetite change, fatigue and unexpected weight change.   HENT: Negative.     Eyes:  Negative for visual disturbance.   Respiratory:  Negative for shortness of breath and wheezing.    Cardiovascular:  Negative for chest pain, palpitations and leg swelling.   Gastrointestinal:  Negative for abdominal pain, bloating and blood in stool.   Endocrine: Negative for diabetes and hair loss.   Genitourinary:  Positive for frequency and urgency. Negative for decreased libido, dyspareunia, hot flashes and vaginal dryness.   Musculoskeletal:  Negative for back pain and joint swelling.   Integumentary:  Negative for acne, hair changes and nipple discharge.   Neurological:  Negative for headaches.   Hematological:  Does not bruise/bleed easily.   Psychiatric/Behavioral:  Negative for depression and sleep disturbance. The patient is not nervous/anxious.    Breast: Negative for mastodynia and nipple discharge          Objective:      Physical Exam:   Constitutional: She is oriented to person, place, and time. She appears well-developed and well-nourished.    HENT:   Head: Normocephalic and atraumatic.    Eyes: Pupils are equal, round, and reactive to light. EOM are normal.     Cardiovascular:  Normal rate and regular rhythm.             Pulmonary/Chest: Effort normal and breath sounds normal.   BREASTS:  no mass, no tenderness, no deformity and no retraction. Right breast exhibits no inverted nipple, no mass, no nipple discharge, no skin change, no tenderness, no bleeding and no swelling. Left breast exhibits no inverted nipple, no mass, no nipple discharge, no skin change, no tenderness, no bleeding and no swelling. Breasts are symmetrical.              Abdominal: Soft. Bowel sounds are normal.     Genitourinary:    Pelvic exam was performed with patient supine.      Genitourinary Comments: PELVIC: Normal  external female genitalia without lesions. Normal hair distribution. Adequate perineal body, normal urethral meatus. Vagina Moist and well rugated without lesions or discharge. No significant cystocele or rectocele. Bimanual exam shows uterus and cervix to be surgically absent. Adnexa without masses or tenderness.  RECTAL: Deferred                 Musculoskeletal: Normal range of motion and moves all extremeties.       Neurological: She is alert and oriented to person, place, and time.    Skin: Skin is warm and dry.    Psychiatric: She has a normal mood and affect.              Assessment:        1. Encounter for gynecological examination without abnormal finding    2. Screening mammogram, encounter for    3. Vaginal irritation    4. Urinary urgency    5. Postmenopausal hormone replacement therapy                Plan:      Problem List Items Addressed This Visit    None  Visit Diagnoses       Encounter for gynecological examination without abnormal finding    -  Primary    Screening mammogram, encounter for        Relevant Orders    Mammo Digital Screening Bilat w/ Tim    Vaginal irritation        Relevant Orders    Vaginosis Screen by DNA Probe    Urinary urgency        Relevant Orders    Urine culture    Urinalysis    Postmenopausal hormone replacement therapy       Continue E 5/T76 monthly          Follow up in about 1 year (around 6/20/2025).

## 2024-06-21 LAB
BACTERIA UR CULT: NORMAL
BACTERIA UR CULT: NORMAL
BACTERIAL VAGINOSIS DNA: NEGATIVE
CANDIDA GLABRATA DNA: NEGATIVE
CANDIDA KRUSEI DNA: NEGATIVE
CANDIDA RRNA VAG QL PROBE: NEGATIVE
T VAGINALIS RRNA GENITAL QL PROBE: NEGATIVE

## 2024-06-28 ENCOUNTER — OFFICE VISIT (OUTPATIENT)
Dept: ENDOCRINOLOGY | Facility: CLINIC | Age: 64
End: 2024-06-28
Payer: COMMERCIAL

## 2024-06-28 DIAGNOSIS — M85.80 OSTEOPENIA, UNSPECIFIED LOCATION: ICD-10-CM

## 2024-06-28 DIAGNOSIS — E89.0 POSTABLATIVE HYPOTHYROIDISM: Primary | ICD-10-CM

## 2024-06-28 DIAGNOSIS — E55.9 VITAMIN D DEFICIENCY: ICD-10-CM

## 2024-06-28 NOTE — ASSESSMENT & PLAN NOTE
--Biochemically euthyroid by last TSH   --Clinically euthyroid  --Last TSH at goal  --Goal is for a normal TSH to avoid CV and bone complications  --Continue NP thyroid 90 mg 6 days per week

## 2024-06-28 NOTE — PROGRESS NOTES
Subjective:      Patient ID: Alma Jackson is a 63 y.o. female.    Chief Complaint:  Hypothyroidism and Osteopenia    The patient location is: Home  The chief complaint leading to consultation is: Hypothyroidism    Visit type: audiovisual    Face to Face time with patient: 12 min  25 minutes of total time spent on the encounter, which includes face to face time and non-face to face time preparing to see the patient (eg, review of tests), Obtaining and/or reviewing separately obtained history, Documenting clinical information in the electronic or other health record, Independently interpreting results (not separately reported) and communicating results to the patient/family/caregiver, or Care coordination (not separately reported).     Each patient to whom he or she provides medical services by telemedicine is:  (1) informed of the relationship between the physician and patient and the respective role of any other health care provider with respect to management of the patient; and (2) notified that he or she may decline to receive medical services by telemedicine and may withdraw from such care at any time.      History of Present Illness  Ms. Jackson presents for follow up of hypothyroidism, osteopenia and vitamin D deficiency. Last visit in 2/2023.     Has active history of hypothyroidism, vit D def, osteopenia, and menopause on HRT.      Has history of Grave's disease s/p total thyroidectomy and LICEA ablation several years ago. 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 Carreon in 2012.       In regards to hypothyroidism      Last TSH 2.97 in 11/2023.      Currently on [place] thyroid 90 mg 6 days per week.   No overt fatigue.   Denies palpitations. Has mild intermittent tremor in hands.       With regards to osteopenia      current medication: none    Calcium intake?  1000 mg a day   Vit D intake?  1000 units once daily  Weight bearing exercise?  Walks a fair  "amount and does pilates several times per week  Recent falls? No    No recent fractures      Last bone density 2/2024:  COMPARISON:  Comparison study done on 01/04/2022.     Lumbar spine:     BMD 1.198 g/cm2 and T-score 1.4.     Total Hip:           BMD 0.842 g/cm2 and T-score -0.8.     Distal 1/3 radius: Not applicable     FINDINGS:  Lumbar spine (L1-L4):               BMD is 1.227 g/cm2, T-score is 1.6, and Z-score is 3.4.     Total hip:                                BMD is 0.863 g/cm2, T-score is -0.6, and Z-score is 0.6.     Femoral neck:                          BMD is 0.702 g/cm2, T-score is -1.3, and Z-score is 0.2.     Distal 1/3 radius:                      Not applicable     FRAX:     8.5% risk of a major osteoporotic fracture in the next 10 years.     0.8% risk of hip fracture in the next 10 years.     Impression:     *Low bone mass (Osteopenia); FRAX calculations do not support treatment as osteoporosis  *Fracture risk is low  *Compared with previous DXA, BMD at the lumbar spine has increased by 2.4%, and BMD at the total hip has remained stable         With regards to the vitamin d deficiency:  Currently taking 1000 units of Vitamin D3 daily    Review of Systems   Constitutional:  Negative for chills and fever.   Gastrointestinal:  Negative for nausea.       Objective:   Physical Exam  Constitutional:       General: She is not in acute distress.     Appearance: Normal appearance. She is not ill-appearing.   Neurological:      Mental Status: She is alert.       BP Readings from Last 3 Encounters:   06/20/24 (!) 138/93   02/14/24 (!) 148/88   07/27/23 128/82     Wt Readings from Last 1 Encounters:   06/20/24 0918 67.8 kg (149 lb 6.4 oz)       There is no height or weight on file to calculate BMI.    Lab Review:   No results found for: "HGBA1C"  Lab Results   Component Value Date    CHOL 196 10/13/2020    HDL 47 10/13/2020    LDLCALC 133.0 10/13/2020    TRIG 80 10/13/2020    CHOLHDL 24.0 10/13/2020 "     Lab Results   Component Value Date     10/13/2020    K 3.7 10/13/2020     10/13/2020    CO2 24 10/13/2020    GLU TNP 11/20/2022    BUN 10 10/13/2020    CREATININE 0.7 10/13/2020    CALCIUM 8.8 10/13/2020    PROT 7.0 10/13/2020    ALBUMIN 4.0 10/13/2020    BILITOT 0.5 10/13/2020    ALKPHOS 54 (L) 10/13/2020    AST 16 10/13/2020    ALT 15 10/13/2020    ANIONGAP 11 10/13/2020    ESTGFRAFRICA >60 10/13/2020    EGFRNONAA >60 10/13/2020    TSH 0.732 04/21/2023         Assessment and Plan     Hypothyroidism  --Biochemically euthyroid by last TSH   --Clinically euthyroid  --Last TSH at goal  --Goal is for a normal TSH to avoid CV and bone complications  --Continue NP thyroid 90 mg 6 days per week    Vitamin D deficiency  --Currently on vit D3 1000 units once daily      Osteopenia  --No history of fracture  --No falls  --On adequate calcium and vitamin D supplementation  --On HRT which will protect bones  --Last bone density did not indicate a need for medical therapy and was relatively stable in 2/2024  --Repeat BMD 2/2026      Follow up in one year      Torres Sarmiento M.D. Staff Endocrinology

## 2024-06-28 NOTE — ASSESSMENT & PLAN NOTE
--No history of fracture  --No falls  --On adequate calcium and vitamin D supplementation  --On HRT which will protect bones  --Last bone density did not indicate a need for medical therapy and was relatively stable in 2/2024  --Repeat BMD 2/2026

## 2024-07-08 ENCOUNTER — CLINICAL SUPPORT (OUTPATIENT)
Dept: OBSTETRICS AND GYNECOLOGY | Facility: CLINIC | Age: 64
End: 2024-07-08
Payer: COMMERCIAL

## 2024-07-08 DIAGNOSIS — N95.1 MENOPAUSAL SYMPTOMS: Primary | ICD-10-CM

## 2024-07-08 PROCEDURE — 96372 THER/PROPH/DIAG INJ SC/IM: CPT | Mod: S$GLB,,, | Performed by: OBSTETRICS & GYNECOLOGY

## 2024-07-08 PROCEDURE — 99999 PR PBB SHADOW E&M-EST. PATIENT-LVL I: CPT | Mod: PBBFAC,,,

## 2024-07-08 RX ADMIN — TESTOSTERONE CYPIONATE 76 MG: 200 INJECTION, SOLUTION INTRAMUSCULAR at 08:07

## 2024-07-13 ENCOUNTER — HOSPITAL ENCOUNTER (OUTPATIENT)
Dept: RADIOLOGY | Facility: OTHER | Age: 64
Discharge: HOME | End: 2024-07-13
Attending: OBSTETRICS & GYNECOLOGY
Payer: COMMERCIAL

## 2024-07-13 DIAGNOSIS — Z12.31 SCREENING MAMMOGRAM, ENCOUNTER FOR: ICD-10-CM

## 2024-07-13 PROCEDURE — 77063 BREAST TOMOSYNTHESIS BI: CPT | Mod: 26,,, | Performed by: RADIOLOGY

## 2024-07-13 PROCEDURE — 77063 BREAST TOMOSYNTHESIS BI: CPT | Mod: TC

## 2024-07-13 PROCEDURE — 77067 SCR MAMMO BI INCL CAD: CPT | Mod: 26,,, | Performed by: RADIOLOGY

## 2024-08-05 ENCOUNTER — CLINICAL SUPPORT (OUTPATIENT)
Dept: OBSTETRICS AND GYNECOLOGY | Facility: CLINIC | Age: 64
End: 2024-08-05
Payer: COMMERCIAL

## 2024-08-05 DIAGNOSIS — N95.1 MENOPAUSAL SYMPTOMS: Primary | ICD-10-CM

## 2024-08-05 PROCEDURE — 96372 THER/PROPH/DIAG INJ SC/IM: CPT | Mod: S$GLB,,, | Performed by: OBSTETRICS & GYNECOLOGY

## 2024-08-05 PROCEDURE — 99999 PR PBB SHADOW E&M-EST. PATIENT-LVL I: CPT | Mod: PBBFAC,,,

## 2024-08-05 RX ADMIN — TESTOSTERONE CYPIONATE 76 MG: 200 INJECTION, SOLUTION INTRAMUSCULAR at 08:08

## 2024-08-26 ENCOUNTER — LAB VISIT (OUTPATIENT)
Dept: LAB | Facility: HOSPITAL | Age: 64
End: 2024-08-26
Attending: OBSTETRICS & GYNECOLOGY
Payer: COMMERCIAL

## 2024-08-26 DIAGNOSIS — N95.1 MENOPAUSAL SYMPTOMS: ICD-10-CM

## 2024-08-26 LAB — ESTRADIOL SERPL-MCNC: 43 PG/ML

## 2024-08-26 PROCEDURE — 82670 ASSAY OF TOTAL ESTRADIOL: CPT | Performed by: OBSTETRICS & GYNECOLOGY

## 2024-08-26 PROCEDURE — 84402 ASSAY OF FREE TESTOSTERONE: CPT | Performed by: OBSTETRICS & GYNECOLOGY

## 2024-08-26 PROCEDURE — 36415 COLL VENOUS BLD VENIPUNCTURE: CPT | Mod: PO | Performed by: OBSTETRICS & GYNECOLOGY

## 2024-08-29 LAB — TESTOST FREE SERPL-MCNC: 2.5 PG/ML

## 2024-08-30 ENCOUNTER — PATIENT MESSAGE (OUTPATIENT)
Dept: ENDOCRINOLOGY | Facility: CLINIC | Age: 64
End: 2024-08-30
Payer: COMMERCIAL

## 2024-08-30 DIAGNOSIS — E89.0 POSTABLATIVE HYPOTHYROIDISM: Primary | ICD-10-CM

## 2024-09-03 ENCOUNTER — CLINICAL SUPPORT (OUTPATIENT)
Dept: OBSTETRICS AND GYNECOLOGY | Facility: CLINIC | Age: 64
End: 2024-09-03
Payer: COMMERCIAL

## 2024-09-03 DIAGNOSIS — N95.1 MENOPAUSAL SYMPTOMS: Primary | ICD-10-CM

## 2024-09-03 PROCEDURE — 96372 THER/PROPH/DIAG INJ SC/IM: CPT | Mod: S$GLB,,, | Performed by: OBSTETRICS & GYNECOLOGY

## 2024-09-03 RX ORDER — ESTRADIOL VALERATE 40 MG/ML
10 INJECTION INTRAMUSCULAR
Status: COMPLETED | OUTPATIENT
Start: 2024-09-03 | End: 2024-09-03

## 2024-09-03 RX ADMIN — ESTRADIOL VALERATE 10 MG: 40 INJECTION INTRAMUSCULAR at 01:09

## 2024-09-03 RX ADMIN — TESTOSTERONE CYPIONATE 76 MG: 200 INJECTION, SOLUTION INTRAMUSCULAR at 01:09

## 2024-09-03 NOTE — PROGRESS NOTES
Here for hormone therapy injection, no complaints at this time, Injection given as ordered, tolerated well, no report of pain prior to or after injection. Return to clinic as scheduled.     Site - LB    Testosterone  76 mg  Delestrogen 10 mg # 1    Clinic Supplied Medication

## 2024-09-06 ENCOUNTER — TELEPHONE (OUTPATIENT)
Dept: OBSTETRICS AND GYNECOLOGY | Facility: CLINIC | Age: 64
End: 2024-09-06
Payer: COMMERCIAL

## 2024-09-06 ENCOUNTER — LAB VISIT (OUTPATIENT)
Dept: LAB | Facility: HOSPITAL | Age: 64
End: 2024-09-06
Attending: INTERNAL MEDICINE
Payer: COMMERCIAL

## 2024-09-06 DIAGNOSIS — E89.0 POSTABLATIVE HYPOTHYROIDISM: ICD-10-CM

## 2024-09-06 LAB
ANION GAP SERPL CALC-SCNC: 7 MMOL/L (ref 8–16)
BUN SERPL-MCNC: 17 MG/DL (ref 8–23)
CALCIUM SERPL-MCNC: 10 MG/DL (ref 8.7–10.5)
CHLORIDE SERPL-SCNC: 104 MMOL/L (ref 95–110)
CO2 SERPL-SCNC: 28 MMOL/L (ref 23–29)
CREAT SERPL-MCNC: 0.7 MG/DL (ref 0.5–1.4)
EST. GFR  (NO RACE VARIABLE): >60 ML/MIN/1.73 M^2
ESTIMATED AVG GLUCOSE: 100 MG/DL (ref 68–131)
GLUCOSE SERPL-MCNC: 90 MG/DL (ref 70–110)
HBA1C MFR BLD: 5.1 % (ref 4–5.6)
POTASSIUM SERPL-SCNC: 4.1 MMOL/L (ref 3.5–5.1)
SODIUM SERPL-SCNC: 139 MMOL/L (ref 136–145)
T3 SERPL-MCNC: 185 NG/DL (ref 60–180)
T4 FREE SERPL-MCNC: 0.83 NG/DL (ref 0.71–1.51)
TSH SERPL DL<=0.005 MIU/L-ACNC: 0.43 UIU/ML (ref 0.4–4)

## 2024-09-06 PROCEDURE — 84480 ASSAY TRIIODOTHYRONINE (T3): CPT | Performed by: INTERNAL MEDICINE

## 2024-09-06 PROCEDURE — 84443 ASSAY THYROID STIM HORMONE: CPT | Performed by: INTERNAL MEDICINE

## 2024-09-06 PROCEDURE — 84439 ASSAY OF FREE THYROXINE: CPT | Performed by: INTERNAL MEDICINE

## 2024-09-06 PROCEDURE — 83036 HEMOGLOBIN GLYCOSYLATED A1C: CPT | Performed by: INTERNAL MEDICINE

## 2024-09-06 PROCEDURE — 80048 BASIC METABOLIC PNL TOTAL CA: CPT | Performed by: INTERNAL MEDICINE

## 2024-09-06 PROCEDURE — 36415 COLL VENOUS BLD VENIPUNCTURE: CPT | Mod: PO | Performed by: INTERNAL MEDICINE

## 2024-10-01 ENCOUNTER — CLINICAL SUPPORT (OUTPATIENT)
Dept: OBSTETRICS AND GYNECOLOGY | Facility: CLINIC | Age: 64
End: 2024-10-01
Payer: COMMERCIAL

## 2024-10-01 DIAGNOSIS — N95.1 MENOPAUSAL SYMPTOMS: Primary | ICD-10-CM

## 2024-10-01 PROCEDURE — 96372 THER/PROPH/DIAG INJ SC/IM: CPT | Mod: S$GLB,,, | Performed by: OBSTETRICS & GYNECOLOGY

## 2024-10-01 PROCEDURE — 99999 PR PBB SHADOW E&M-EST. PATIENT-LVL I: CPT | Mod: PBBFAC,,,

## 2024-10-01 RX ORDER — ESTRADIOL VALERATE 20 MG/ML
10 INJECTION INTRAMUSCULAR
Status: SHIPPED | OUTPATIENT
Start: 2024-10-01

## 2024-10-01 RX ORDER — TESTOSTERONE CYPIONATE 200 MG/ML
76 INJECTION, SOLUTION INTRAMUSCULAR
Status: SHIPPED | OUTPATIENT
Start: 2024-10-01 | End: 2025-03-18

## 2024-10-01 RX ADMIN — TESTOSTERONE CYPIONATE 76 MG: 200 INJECTION, SOLUTION INTRAMUSCULAR at 08:10

## 2024-10-01 RX ADMIN — ESTRADIOL VALERATE 10 MG: 20 INJECTION INTRAMUSCULAR at 08:10

## 2024-10-01 NOTE — PROGRESS NOTES
Here for hormone therapy injection, no complaints at this time, Injection given as ordered, tolerated well, no report of pain prior to or after injection. Return to clinic as scheduled.     Site - RB    Testosterone  76  mg  Delestrogen 10 mg # 2    Clinic Supplied Medication

## 2024-10-28 ENCOUNTER — CLINICAL SUPPORT (OUTPATIENT)
Dept: OBSTETRICS AND GYNECOLOGY | Facility: CLINIC | Age: 64
End: 2024-10-28
Payer: COMMERCIAL

## 2024-10-28 DIAGNOSIS — N95.1 MENOPAUSAL SYMPTOMS: Primary | ICD-10-CM

## 2024-10-28 PROCEDURE — 96372 THER/PROPH/DIAG INJ SC/IM: CPT | Mod: S$GLB,,, | Performed by: OBSTETRICS & GYNECOLOGY

## 2024-10-28 PROCEDURE — 99999 PR PBB SHADOW E&M-EST. PATIENT-LVL I: CPT | Mod: PBBFAC,,,

## 2024-10-28 RX ORDER — ESTRADIOL VALERATE 40 MG/ML
10 INJECTION INTRAMUSCULAR
Status: SHIPPED | OUTPATIENT
Start: 2024-10-28

## 2024-10-28 RX ADMIN — TESTOSTERONE CYPIONATE 76 MG: 200 INJECTION, SOLUTION INTRAMUSCULAR at 08:10

## 2024-10-28 RX ADMIN — ESTRADIOL VALERATE 10 MG: 40 INJECTION INTRAMUSCULAR at 08:10

## 2024-11-18 ENCOUNTER — LAB VISIT (OUTPATIENT)
Dept: LAB | Facility: HOSPITAL | Age: 64
End: 2024-11-18
Attending: OBSTETRICS & GYNECOLOGY
Payer: COMMERCIAL

## 2024-11-18 DIAGNOSIS — N95.1 MENOPAUSAL SYMPTOMS: ICD-10-CM

## 2024-11-18 LAB — ESTRADIOL SERPL-MCNC: 37 PG/ML

## 2024-11-18 PROCEDURE — 82670 ASSAY OF TOTAL ESTRADIOL: CPT | Performed by: OBSTETRICS & GYNECOLOGY

## 2024-11-18 PROCEDURE — 84402 ASSAY OF FREE TESTOSTERONE: CPT | Performed by: OBSTETRICS & GYNECOLOGY

## 2024-11-19 ENCOUNTER — TELEPHONE (OUTPATIENT)
Dept: OBSTETRICS AND GYNECOLOGY | Facility: CLINIC | Age: 64
End: 2024-11-19
Payer: COMMERCIAL

## 2024-11-19 DIAGNOSIS — R39.9 UTI SYMPTOMS: Primary | ICD-10-CM

## 2024-11-19 NOTE — TELEPHONE ENCOUNTER
----- Message from Deidra sent at 11/19/2024  4:13 PM CST -----  Pt would like to know if something can be called in for her UTI. Pt# [phone].

## 2024-11-20 ENCOUNTER — LAB VISIT (OUTPATIENT)
Dept: LAB | Facility: HOSPITAL | Age: 64
End: 2024-11-20
Attending: OBSTETRICS & GYNECOLOGY
Payer: COMMERCIAL

## 2024-11-20 DIAGNOSIS — R39.9 UTI SYMPTOMS: ICD-10-CM

## 2024-11-20 PROCEDURE — 87086 URINE CULTURE/COLONY COUNT: CPT | Performed by: OBSTETRICS & GYNECOLOGY

## 2024-11-21 LAB
BACTERIA UR CULT: NORMAL
BACTERIA UR CULT: NORMAL
TESTOST FREE SERPL-MCNC: 1.8 PG/ML

## 2024-11-22 ENCOUNTER — PATIENT MESSAGE (OUTPATIENT)
Dept: OBSTETRICS AND GYNECOLOGY | Facility: CLINIC | Age: 64
End: 2024-11-22
Payer: COMMERCIAL

## 2024-11-22 ENCOUNTER — TELEPHONE (OUTPATIENT)
Dept: OBSTETRICS AND GYNECOLOGY | Facility: CLINIC | Age: 64
End: 2024-11-22
Payer: COMMERCIAL

## 2024-11-22 DIAGNOSIS — R39.9 UTI SYMPTOMS: Primary | ICD-10-CM

## 2024-11-22 RX ORDER — NITROFURANTOIN 25; 75 MG/1; MG/1
100 CAPSULE ORAL 2 TIMES DAILY
Qty: 14 CAPSULE | Refills: 0 | Status: SHIPPED | OUTPATIENT
Start: 2024-11-22 | End: 2024-11-29

## 2024-11-25 ENCOUNTER — CLINICAL SUPPORT (OUTPATIENT)
Dept: OBSTETRICS AND GYNECOLOGY | Facility: CLINIC | Age: 64
End: 2024-11-25
Payer: COMMERCIAL

## 2024-11-25 DIAGNOSIS — N95.1 MENOPAUSAL SYMPTOMS: Primary | ICD-10-CM

## 2024-11-25 PROCEDURE — 99999 PR PBB SHADOW E&M-EST. PATIENT-LVL I: CPT | Mod: PBBFAC,,,

## 2024-11-25 PROCEDURE — 96372 THER/PROPH/DIAG INJ SC/IM: CPT | Mod: S$GLB,,, | Performed by: OBSTETRICS & GYNECOLOGY

## 2024-11-25 RX ADMIN — ESTRADIOL VALERATE 10 MG: 40 INJECTION INTRAMUSCULAR at 09:11

## 2024-11-25 RX ADMIN — TESTOSTERONE CYPIONATE 76 MG: 200 INJECTION, SOLUTION INTRAMUSCULAR at 09:11

## 2024-11-25 NOTE — PROGRESS NOTES
Here for hormone therapy injection, no complaints at this time, Injection given as ordered, tolerated well, no report of pain prior to or after injection. Return to clinic as scheduled.     Site - RB    Testosterone  76 mg  Delestrogen 10 mg    Clinic Supplied Medication

## 2024-12-23 ENCOUNTER — CLINICAL SUPPORT (OUTPATIENT)
Dept: OBSTETRICS AND GYNECOLOGY | Facility: CLINIC | Age: 64
End: 2024-12-23
Payer: COMMERCIAL

## 2024-12-23 DIAGNOSIS — N95.1 MENOPAUSAL SYMPTOMS: Primary | ICD-10-CM

## 2024-12-23 PROCEDURE — 96372 THER/PROPH/DIAG INJ SC/IM: CPT | Mod: S$GLB,,, | Performed by: OBSTETRICS & GYNECOLOGY

## 2024-12-23 PROCEDURE — 99999 PR PBB SHADOW E&M-EST. PATIENT-LVL I: CPT | Mod: PBBFAC,,,

## 2024-12-23 RX ADMIN — ESTRADIOL VALERATE 10 MG: 40 INJECTION INTRAMUSCULAR at 08:12

## 2024-12-23 RX ADMIN — TESTOSTERONE CYPIONATE 76 MG: 200 INJECTION, SOLUTION INTRAMUSCULAR at 08:12

## 2024-12-23 NOTE — PROGRESS NOTES
Here for hormone therapy injection, no complaints at this time, Injection given as ordered, tolerated well, no report of pain prior to or after injection. Return to clinic as scheduled.     Site - LB    Testosterone  76  mg  Delestrogen 10 mg    Clinic Supplied Medication

## 2025-01-13 ENCOUNTER — OFFICE VISIT (OUTPATIENT)
Dept: FAMILY MEDICINE | Facility: CLINIC | Age: 65
End: 2025-01-13
Attending: FAMILY MEDICINE
Payer: COMMERCIAL

## 2025-01-13 VITALS
HEART RATE: 84 BPM | BODY MASS INDEX: 25.83 KG/M2 | DIASTOLIC BLOOD PRESSURE: 78 MMHG | WEIGHT: 145.81 LBS | OXYGEN SATURATION: 98 % | SYSTOLIC BLOOD PRESSURE: 127 MMHG

## 2025-01-13 DIAGNOSIS — Z86.19 HISTORY OF HERPES GENITALIS: ICD-10-CM

## 2025-01-13 DIAGNOSIS — I10 ESSENTIAL HYPERTENSION: ICD-10-CM

## 2025-01-13 DIAGNOSIS — Z00.00 ANNUAL PHYSICAL EXAM: Primary | ICD-10-CM

## 2025-01-13 DIAGNOSIS — E89.0 POSTOPERATIVE HYPOTHYROIDISM: ICD-10-CM

## 2025-01-13 PROCEDURE — 3078F DIAST BP <80 MM HG: CPT | Mod: CPTII,S$GLB,, | Performed by: FAMILY MEDICINE

## 2025-01-13 PROCEDURE — 82570 ASSAY OF URINE CREATININE: CPT | Performed by: FAMILY MEDICINE

## 2025-01-13 PROCEDURE — 1159F MED LIST DOCD IN RCRD: CPT | Mod: CPTII,S$GLB,, | Performed by: FAMILY MEDICINE

## 2025-01-13 PROCEDURE — 3008F BODY MASS INDEX DOCD: CPT | Mod: CPTII,S$GLB,, | Performed by: FAMILY MEDICINE

## 2025-01-13 PROCEDURE — 99214 OFFICE O/P EST MOD 30 MIN: CPT | Mod: S$GLB,,, | Performed by: FAMILY MEDICINE

## 2025-01-13 PROCEDURE — 1101F PT FALLS ASSESS-DOCD LE1/YR: CPT | Mod: CPTII,S$GLB,, | Performed by: FAMILY MEDICINE

## 2025-01-13 PROCEDURE — 1160F RVW MEDS BY RX/DR IN RCRD: CPT | Mod: CPTII,S$GLB,, | Performed by: FAMILY MEDICINE

## 2025-01-13 PROCEDURE — 99999 PR PBB SHADOW E&M-EST. PATIENT-LVL IV: CPT | Mod: PBBFAC,,, | Performed by: FAMILY MEDICINE

## 2025-01-13 PROCEDURE — 1126F AMNT PAIN NOTED NONE PRSNT: CPT | Mod: CPTII,S$GLB,, | Performed by: FAMILY MEDICINE

## 2025-01-13 PROCEDURE — 81003 URINALYSIS AUTO W/O SCOPE: CPT | Performed by: FAMILY MEDICINE

## 2025-01-13 PROCEDURE — 3074F SYST BP LT 130 MM HG: CPT | Mod: CPTII,S$GLB,, | Performed by: FAMILY MEDICINE

## 2025-01-13 PROCEDURE — 3044F HG A1C LEVEL LT 7.0%: CPT | Mod: CPTII,S$GLB,, | Performed by: FAMILY MEDICINE

## 2025-01-13 PROCEDURE — 3288F FALL RISK ASSESSMENT DOCD: CPT | Mod: CPTII,S$GLB,, | Performed by: FAMILY MEDICINE

## 2025-01-13 RX ORDER — VALACYCLOVIR HYDROCHLORIDE 1 G/1
1000 TABLET, FILM COATED ORAL EVERY 12 HOURS
Qty: 60 TABLET | Refills: 6 | Status: SHIPPED | OUTPATIENT
Start: 2025-01-13

## 2025-01-14 ENCOUNTER — LAB VISIT (OUTPATIENT)
Dept: LAB | Facility: HOSPITAL | Age: 65
End: 2025-01-14
Attending: FAMILY MEDICINE
Payer: COMMERCIAL

## 2025-01-14 DIAGNOSIS — E89.0 POSTOPERATIVE HYPOTHYROIDISM: ICD-10-CM

## 2025-01-14 DIAGNOSIS — Z00.00 ANNUAL PHYSICAL EXAM: ICD-10-CM

## 2025-01-14 DIAGNOSIS — I10 ESSENTIAL HYPERTENSION: ICD-10-CM

## 2025-01-14 LAB
ALBUMIN SERPL BCP-MCNC: 3.8 G/DL (ref 3.5–5.2)
ALP SERPL-CCNC: 65 U/L (ref 40–150)
ALT SERPL W/O P-5'-P-CCNC: 22 U/L (ref 10–44)
ANION GAP SERPL CALC-SCNC: 12 MMOL/L (ref 8–16)
AST SERPL-CCNC: 19 U/L (ref 10–40)
BILIRUB SERPL-MCNC: 0.3 MG/DL (ref 0.1–1)
BILIRUB UR QL STRIP: NEGATIVE
BUN SERPL-MCNC: 13 MG/DL (ref 8–23)
CALCIUM SERPL-MCNC: 9.3 MG/DL (ref 8.7–10.5)
CCP AB SER IA-ACNC: <0.5 U/ML
CHLORIDE SERPL-SCNC: 100 MMOL/L (ref 95–110)
CHOLEST SERPL-MCNC: 212 MG/DL (ref 120–199)
CHOLEST/HDLC SERPL: 4.5 {RATIO} (ref 2–5)
CLARITY UR REFRACT.AUTO: CLEAR
CO2 SERPL-SCNC: 27 MMOL/L (ref 23–29)
COLOR UR AUTO: YELLOW
CREAT SERPL-MCNC: 0.7 MG/DL (ref 0.5–1.4)
CREAT UR-MCNC: 214 MG/DL (ref 15–325)
CRP SERPL-MCNC: 4.3 MG/L (ref 0–8.2)
ERYTHROCYTE [SEDIMENTATION RATE] IN BLOOD BY PHOTOMETRIC METHOD: 17 MM/HR (ref 0–36)
EST. GFR  (NO RACE VARIABLE): >60 ML/MIN/1.73 M^2
ESTIMATED AVG GLUCOSE: 100 MG/DL (ref 68–131)
GLUCOSE SERPL-MCNC: 90 MG/DL (ref 70–110)
GLUCOSE UR QL STRIP: NEGATIVE
HBA1C MFR BLD: 5.1 % (ref 4–5.6)
HCV AB SERPL QL IA: NORMAL
HDLC SERPL-MCNC: 47 MG/DL (ref 40–75)
HDLC SERPL: 22.2 % (ref 20–50)
HGB UR QL STRIP: NEGATIVE
KETONES UR QL STRIP: NEGATIVE
LDLC SERPL CALC-MCNC: 133.2 MG/DL (ref 63–159)
LEUKOCYTE ESTERASE UR QL STRIP: NEGATIVE
NITRITE UR QL STRIP: NEGATIVE
NONHDLC SERPL-MCNC: 165 MG/DL
PH UR STRIP: 6 [PH] (ref 5–8)
POTASSIUM SERPL-SCNC: 4.4 MMOL/L (ref 3.5–5.1)
PROT SERPL-MCNC: 7 G/DL (ref 6–8.4)
PROT UR QL STRIP: ABNORMAL
PROT UR-MCNC: 11 MG/DL (ref 0–15)
PROT/CREAT UR: 0.05 MG/G{CREAT} (ref 0–0.2)
RHEUMATOID FACT SERPL-ACNC: <13 IU/ML (ref 0–15)
SODIUM SERPL-SCNC: 139 MMOL/L (ref 136–145)
SP GR UR STRIP: >=1.03 (ref 1–1.03)
TRIGL SERPL-MCNC: 159 MG/DL (ref 30–150)
TSH SERPL DL<=0.005 MIU/L-ACNC: 3.43 UIU/ML (ref 0.4–4)
URN SPEC COLLECT METH UR: ABNORMAL

## 2025-01-14 PROCEDURE — 86235 NUCLEAR ANTIGEN ANTIBODY: CPT | Performed by: FAMILY MEDICINE

## 2025-01-14 PROCEDURE — 86200 CCP ANTIBODY: CPT | Performed by: FAMILY MEDICINE

## 2025-01-14 PROCEDURE — 85652 RBC SED RATE AUTOMATED: CPT | Performed by: FAMILY MEDICINE

## 2025-01-14 PROCEDURE — 86803 HEPATITIS C AB TEST: CPT | Performed by: FAMILY MEDICINE

## 2025-01-14 PROCEDURE — 86431 RHEUMATOID FACTOR QUANT: CPT | Performed by: FAMILY MEDICINE

## 2025-01-14 PROCEDURE — 86038 ANTINUCLEAR ANTIBODIES: CPT | Performed by: FAMILY MEDICINE

## 2025-01-14 PROCEDURE — 86140 C-REACTIVE PROTEIN: CPT | Performed by: FAMILY MEDICINE

## 2025-01-14 PROCEDURE — 80053 COMPREHEN METABOLIC PANEL: CPT | Performed by: FAMILY MEDICINE

## 2025-01-14 PROCEDURE — 80061 LIPID PANEL: CPT | Performed by: FAMILY MEDICINE

## 2025-01-14 PROCEDURE — 83036 HEMOGLOBIN GLYCOSYLATED A1C: CPT | Performed by: FAMILY MEDICINE

## 2025-01-14 PROCEDURE — 84443 ASSAY THYROID STIM HORMONE: CPT | Performed by: FAMILY MEDICINE

## 2025-01-15 LAB
ANA SER QL IF: NORMAL
ANTI-SSA ANTIBODY: 0.11 RATIO (ref 0–0.99)
ANTI-SSA INTERPRETATION: NEGATIVE

## 2025-01-18 ENCOUNTER — PATIENT MESSAGE (OUTPATIENT)
Dept: FAMILY MEDICINE | Facility: CLINIC | Age: 65
End: 2025-01-18
Payer: COMMERCIAL

## 2025-01-20 NOTE — TELEPHONE ENCOUNTER
Please send prescription to the pharmacy for UTI.  Patient would like you to send it to [facility].  She said that you told her that you would send to her pharmacy after reviewing her results. Thanks

## 2025-01-21 ENCOUNTER — PATIENT MESSAGE (OUTPATIENT)
Dept: FAMILY MEDICINE | Facility: CLINIC | Age: 65
End: 2025-01-21
Payer: COMMERCIAL

## 2025-01-23 ENCOUNTER — PATIENT MESSAGE (OUTPATIENT)
Dept: FAMILY MEDICINE | Facility: CLINIC | Age: 65
End: 2025-01-23
Payer: COMMERCIAL

## 2025-01-23 NOTE — PROGRESS NOTES
Subjective:       Patient ID: Alma Jackson is a 64 y.o. female.    Chief Complaint: Establish Care    HPI  Pt is here for annual exam pt is well no sob/cp no cough chest congestion no sore throat uri symptoms no dysuria hematuria no vaginal bleeding no n/v/f/c/d/c no change in bowel habits   Pt has htn bp fine on aldactone hct no muscle cramps   Pt has hypothyroid on armour thyroid  Review of Systems   Constitutional:  Negative for activity change, chills, diaphoresis, fatigue and fever.   HENT:  Negative for congestion, ear discharge, ear pain, hearing loss, postnasal drip, rhinorrhea, sinus pressure, sneezing, sore throat, trouble swallowing and voice change.    Eyes:  Negative for photophobia, discharge, redness, itching and visual disturbance.   Respiratory:  Negative for cough, chest tightness, shortness of breath and wheezing.    Cardiovascular:  Negative for chest pain, palpitations and leg swelling.   Gastrointestinal:  Negative for abdominal pain, anal bleeding, blood in stool, constipation, diarrhea, nausea, rectal pain and vomiting.   Endocrine: Negative for cold intolerance and heat intolerance.   Genitourinary:  Negative for dyspareunia, dysuria, flank pain, frequency, hematuria, menstrual problem, pelvic pain, urgency, vaginal bleeding and vaginal discharge.   Musculoskeletal:  Negative for arthralgias, back pain, joint swelling and neck pain.   Skin:  Negative for color change and rash.   Neurological:  Negative for dizziness, speech difficulty, weakness, light-headedness, numbness and headaches.   Hematological:  Does not bruise/bleed easily.   Psychiatric/Behavioral:  Negative for agitation, confusion, decreased concentration, sleep disturbance and suicidal ideas. The patient is not nervous/anxious.        Objective:    /78   Pulse 84   Wt 66.1 kg (145 lb 12.8 oz)   LMP  (LMP Unknown)   SpO2 98%   BMI 25.83 kg/m²     Physical Exam  Constitutional:       Appearance: Normal  "appearance. She is well-developed. She is not ill-appearing.   HENT:      Head: Normocephalic and atraumatic.      Right Ear: External ear normal.      Left Ear: External ear normal.      Nose: Nose normal.   Eyes:      General:         Right eye: No discharge.         Left eye: No discharge.      Conjunctiva/sclera: Conjunctivae normal.      Pupils: Pupils are equal, round, and reactive to light.   Neck:      Thyroid: No thyromegaly.   Cardiovascular:      Rate and Rhythm: Normal rate and regular rhythm.      Heart sounds: Normal heart sounds. No murmur heard.     No friction rub. No gallop.   Pulmonary:      Effort: Pulmonary effort is normal.      Breath sounds: Normal breath sounds. No wheezing or rales.   Abdominal:      General: Bowel sounds are normal. There is no distension.      Palpations: Abdomen is soft.      Tenderness: There is no abdominal tenderness. There is no guarding or rebound.   Genitourinary:     Vagina: Normal.   Musculoskeletal:         General: No tenderness. Normal range of motion.      Cervical back: Normal range of motion and neck supple.   Lymphadenopathy:      Cervical: No cervical adenopathy.   Skin:     General: Skin is warm and dry.      Findings: No erythema or rash.   Neurological:      Mental Status: She is alert.      Cranial Nerves: No cranial nerve deficit.      Motor: No abnormal muscle tone.      Coordination: Coordination normal.   Psychiatric:         Behavior: Behavior normal.         Thought Content: Thought content normal.         Judgment: Judgment normal.         Assessment:       1. Annual physical exam    2. History of herpes genitalis    3. Postoperative hypothyroidism    4. Essential hypertension        Plan:     Orders cbc cmp lipid tsh urine hgb A1c  Cont meds  Low fat diet  Graded exercise  Rtc 6 months    Health maintenance  Discussed with pt        "This note will not be shared with the patient."     "

## 2025-01-23 NOTE — PROGRESS NOTES
[facility] PRE-OPERATIVE INSTRUCTIONS                                              Neema KATHARINA Randee  [address] [place] [address]    Patient Name: Neema Jeff    Procedure: Lapidus bunionectomy via lapiplasty method with bursectomy and silver procedure, Weil osteotomy, second metatarsal, Excision of neuroma, second interspace, of the left foot     Surgery Date: 6/9/25   Arrival Time: TBD                  Surgery Time: TBD    [facility]  [address] [address]  Phone Number: [phone]    **IF YOU HAVE AN INSURANCE CHANGE BETWEEN THE TIME YOUR SURGERY WAS SCHEDULED AND THE DATE OF YOUR SURGERY, PLEASE NOTIFY THE OFFICE ASAP**    HISTORY AND PHYSICAL EXAM:  [facility] requires History and Physical Exams to be done within 30 days of surgery. If this appointment is not completed within this time frame, an additional visit and co-pay may apply.      PCP: Dr. Rondon - to be scheduled by patient     PRE-ADMISSION LAB WORK:  Yes (Non-Fasting) : To be ordered by PCP  Chest X-Ray: No  EKG: Pre-op EKG : At appointment with PCP      Arrival at the Hospital: You may park in parking lot A in front of the main entrance or if arriving after 7:30AM you may use the complimentary  service provided at the front entrance.  Enter through the main entrance on the ground level. [facility] Screening completed then take the elevator up to the first floor and report to the first-floor information desk.    **One person may accompany you during the admission process and after you are admitted, the number of visitors at any one time is limited to one including pediatric patients.     **Post operative appointment with Dr. Joseph's Physician Extender on 6/18/25 at 11:00 AM at the [place] ([address] [place] [address] in [place]) location.      Medications:  Unless otherwise instructed by your physician, STOP over-the-counter,  HPI     Pt here to consult with Dr. Darling about her lower lids, she states her OS   lashes feel to be scratching her eye in the outer corner and the OD does   not close all of the way and lower OD lid is sore. Pt has Grave's disease.   Pt using artifical tears twoce daily.     Last edited by Lorie Yañez on 5/25/2017  1:16 PM. (History)            Assessment /Plan     For exam results, see Encounter Report.    Dry eyes    Blepharitis of upper and lower eyelids of both eyes, unspecified type    Graves' disease    Diplopia      Patient with blepharitis. Start warm compresses daily. Continue lid scrubs. Start doxycycline 50 mg po bid. Start genteal gerardo qhs ou.     Return in 6 weeks prn sooner any worsening. If no improvement, consider lateral permanent tarsorrhaphy revision ou.                            anti-inflammatories, herbal supplements, and blood thinning medications for 7 days prior to surgery. *If you are taking Aspirin OR prescribed anticoagulant please reach out to your primary care physician or prescribing physician for direction on this medication.* Special instructions related to medications: As directed by     Medication Instructions for Day of Surgery:   Please take your cardiac medicine, high blood pressure medicine, prednisone, and any seizure medication with a sip of water. Do not take any diuretic (water pills) or oral medication for diabetes.  For insulin dependent diabetics:  Do not take any short acting or sliding scale insulin in the morning.  Unless otherwise directed by your personal physician, follow the following instructions: If your procedure is scheduled before noon, take one half of your long acting insulin in the morning.  If your procedure is scheduled after 12 noon, take one third of your long acting insulin in the morning and you may have a clear liquid diet 6 hours prior to your surgery    If you are taking aspirin products or NSAID's (non-steroidal anti-inflammatory medications) multi-vitamins or Vitamin E, you must stop these medications 7 days prior to your surgery/procedure. Tylenol may be used.    Examples of aspirin products or NSAID's  Advil Empirin Aleve   Ibuprofen Nubia [place] Nitza   Ascriptin Motrin Bufferin   Nyquil Naprosyn Excedrin   Voltaren Ecotrin      If you develop a cold or fever, sore throat or runny nose, please notify your doctor immediately.     DIET AND FASTING INSTRUCTIONS   For NON-DIABETIC PATIENTS   The night before surgery: Drink 30 ounces (800 ml) of either apple juice or Gatorade to prevent dehydration. (Note for bowel surgery patients: do not drink any blue, red, or purple liquids.)   Six (6) hours before scheduled arrival time: Stop eating ALL SOLID food, including chewing gum and hard candy. You may continue to have clear liquid during  this time, but NO milk products. APPROVED: soda, water, tea, or coffee with NO added ingredients.   1 ½ hours before scheduled arrival time: Drink 10 ounces of “Ensure Pre-Surgery” clear surgical drink or 10 ounces of Gatorade. Finish this drink within 15 minutes. Do NOT eat or drink anything after you finish the Ensure or Gatorade.     For DIABETIC PATIENTS   The Day before Surgery: Continue your prescribed diet with fluids that are appropriate, like water and sugar-free beverages.   Six (6) hours before scheduled arrival time: Stop eating ALL SOLID food, including chewing gum and hard candy. You may continue to have clear liquids during this time, but NO milk products. APPROVED: water, tea or black coffee with no added ingredients.   1 ½ hours before scheduled arrival time: Drink 5 ounces of “Ensure Pre-Surgery” clear surgical drink or 5 ounces of Gatorade. Finish this drink within 15 minutes. (IF YOU ARE INSULIN-DIABETIC, OMIT THIS STEP.)  Do NOT eat or drink anything after you finish the Ensure or Gatorade.       If you are on Alert: GLP-1 Medications used for Diabetes and/or Weight loss   DO NOT drink 30 ounces of apple juice or Gatorade the evening before surgery. DO NOT drink the “Ensure Pre-Surgery\" clear carbohydrate drink or Gatorade 1 ½ hours prior to arrival. Stop all solids and liquids 6 hours prior to your scheduled arrival time. If you were instructed to take any medications on the day of surgery do so with a sip of water only.  (Please note: These are generic instructions and will not apply to every patient and every case. If the pre-admit nurses tell you to follow different directions, you are to follow their directions regarding this)    **If you are currently taking any of the GLP medications for diabetes or weight loss, please stop these medications at least 7 days prior to your surgery.  This should be discussed at your pre-operative history and physical appointment with your primary care  physician.     *You may shower the morning of surgery or the night before, please remember not to put on deodorant, lotions, powder, perfume/ [place], jewelry or make-up on the day of your procedure. (Please note: These are generic instructions and will not apply to every patient and every case. If the pre-admit nurses tell you to follow different directions, you are to follow their directions regarding this)  * Arrange for an adult to drive you to and from your surgery. An adult must stay with you for the first 24 hours after your surgery.   * Wear loose fitting clothing  * Bring inhalers or nebulizer  * Do not wear contacts   * Do not bring valuables.    : Brigette    Phone: [phone]

## 2025-01-25 ENCOUNTER — CLINICAL SUPPORT (OUTPATIENT)
Dept: OBSTETRICS AND GYNECOLOGY | Facility: CLINIC | Age: 65
End: 2025-01-25
Payer: COMMERCIAL

## 2025-01-25 DIAGNOSIS — N95.1 MENOPAUSAL SYMPTOMS: Primary | ICD-10-CM

## 2025-01-25 PROCEDURE — 99999 PR PBB SHADOW E&M-EST. PATIENT-LVL I: CPT | Mod: PBBFAC,,,

## 2025-01-25 PROCEDURE — 96372 THER/PROPH/DIAG INJ SC/IM: CPT | Mod: S$GLB,,, | Performed by: OBSTETRICS & GYNECOLOGY

## 2025-01-25 RX ADMIN — ESTRADIOL VALERATE 10 MG: 40 INJECTION INTRAMUSCULAR at 09:01

## 2025-01-25 RX ADMIN — TESTOSTERONE CYPIONATE 76 MG: 200 INJECTION, SOLUTION INTRAMUSCULAR at 09:01

## 2025-01-27 NOTE — TELEPHONE ENCOUNTER
[id number] - [place], [place] - [address]

## 2025-02-17 ENCOUNTER — CLINICAL SUPPORT (OUTPATIENT)
Dept: OBSTETRICS AND GYNECOLOGY | Facility: CLINIC | Age: 65
End: 2025-02-17
Payer: COMMERCIAL

## 2025-02-17 DIAGNOSIS — N95.1 MENOPAUSAL SYMPTOMS: Primary | ICD-10-CM

## 2025-02-17 RX ADMIN — TESTOSTERONE CYPIONATE 76 MG: 200 INJECTION, SOLUTION INTRAMUSCULAR at 08:02

## 2025-03-12 DIAGNOSIS — E89.0 POSTABLATIVE HYPOTHYROIDISM: ICD-10-CM

## 2025-03-12 RX ORDER — THYROID 90 MG/1
TABLET ORAL
Qty: 90 TABLET | Refills: 3 | Status: SHIPPED | OUTPATIENT
Start: 2025-03-12

## 2025-03-17 ENCOUNTER — CLINICAL SUPPORT (OUTPATIENT)
Dept: OBSTETRICS AND GYNECOLOGY | Facility: CLINIC | Age: 65
End: 2025-03-17
Payer: COMMERCIAL

## 2025-03-17 DIAGNOSIS — N95.1 MENOPAUSAL SYMPTOMS: Primary | ICD-10-CM

## 2025-03-17 PROCEDURE — 99999 PR PBB SHADOW E&M-EST. PATIENT-LVL I: CPT | Mod: PBBFAC,,,

## 2025-03-17 PROCEDURE — 96372 THER/PROPH/DIAG INJ SC/IM: CPT | Mod: S$GLB,,, | Performed by: OBSTETRICS & GYNECOLOGY

## 2025-03-17 RX ORDER — TESTOSTERONE CYPIONATE 200 MG/ML
76 INJECTION, SOLUTION INTRAMUSCULAR
Status: SHIPPED | OUTPATIENT
Start: 2025-03-17 | End: 2025-06-09

## 2025-03-17 RX ADMIN — ESTRADIOL VALERATE 10 MG: 20 INJECTION INTRAMUSCULAR at 09:03

## 2025-03-17 RX ADMIN — TESTOSTERONE CYPIONATE 76 MG: 200 INJECTION, SOLUTION INTRAMUSCULAR at 09:03

## 2025-04-14 ENCOUNTER — CLINICAL SUPPORT (OUTPATIENT)
Dept: OBSTETRICS AND GYNECOLOGY | Facility: CLINIC | Age: 65
End: 2025-04-14
Payer: COMMERCIAL

## 2025-04-14 DIAGNOSIS — N95.1 MENOPAUSAL SYMPTOMS: Primary | ICD-10-CM

## 2025-04-14 PROCEDURE — 99999 PR PBB SHADOW E&M-EST. PATIENT-LVL I: CPT | Mod: PBBFAC,,,

## 2025-04-14 PROCEDURE — 96372 THER/PROPH/DIAG INJ SC/IM: CPT | Mod: S$GLB,,, | Performed by: OBSTETRICS & GYNECOLOGY

## 2025-04-14 RX ADMIN — ESTRADIOL VALERATE 10 MG: 20 INJECTION INTRAMUSCULAR at 09:04

## 2025-04-14 RX ADMIN — TESTOSTERONE CYPIONATE 76 MG: 200 INJECTION, SOLUTION INTRAMUSCULAR at 09:04

## 2025-04-17 ENCOUNTER — OFFICE VISIT (OUTPATIENT)
Dept: URGENT CARE | Facility: CLINIC | Age: 65
End: 2025-04-17
Payer: COMMERCIAL

## 2025-04-17 VITALS
HEART RATE: 71 BPM | SYSTOLIC BLOOD PRESSURE: 141 MMHG | RESPIRATION RATE: 20 BRPM | TEMPERATURE: 98 F | BODY MASS INDEX: 26.06 KG/M2 | DIASTOLIC BLOOD PRESSURE: 76 MMHG | WEIGHT: 147.06 LBS | OXYGEN SATURATION: 96 % | HEIGHT: 63 IN

## 2025-04-17 DIAGNOSIS — J06.9 UPPER RESPIRATORY TRACT INFECTION, UNSPECIFIED TYPE: ICD-10-CM

## 2025-04-17 DIAGNOSIS — L23.9 ALLERGIC CONTACT DERMATITIS, UNSPECIFIED TRIGGER: Primary | ICD-10-CM

## 2025-04-17 PROCEDURE — 99213 OFFICE O/P EST LOW 20 MIN: CPT | Mod: S$GLB,,, | Performed by: FAMILY MEDICINE

## 2025-04-17 RX ORDER — PREDNISONE 20 MG/1
40 TABLET ORAL DAILY
Qty: 10 TABLET | Refills: 0 | Status: SHIPPED | OUTPATIENT
Start: 2025-04-18 | End: 2025-04-23

## 2025-04-17 RX ORDER — HYDROCHLOROTHIAZIDE 25 MG/1
TABLET ORAL
COMMUNITY

## 2025-04-17 RX ORDER — EZETIMIBE 10 MG/1
10 TABLET ORAL
COMMUNITY

## 2025-04-17 RX ORDER — PROMETHAZINE HYDROCHLORIDE AND DEXTROMETHORPHAN HYDROBROMIDE 6.25; 15 MG/5ML; MG/5ML
SYRUP ORAL
COMMUNITY
Start: 2025-04-14

## 2025-04-17 RX ORDER — FINASTERIDE 5 MG/1
TABLET, FILM COATED ORAL
COMMUNITY

## 2025-04-17 RX ORDER — CYCLOBENZAPRINE HCL 5 MG
5 TABLET ORAL 3 TIMES DAILY
COMMUNITY
Start: 2025-04-01

## 2025-04-17 RX ORDER — PANTOPRAZOLE SODIUM 40 MG/1
20 TABLET, DELAYED RELEASE ORAL
COMMUNITY
Start: 2024-06-06

## 2025-04-17 RX ORDER — TRAMADOL HYDROCHLORIDE 50 MG/1
50 TABLET ORAL 2 TIMES DAILY PRN
COMMUNITY
Start: 2025-04-01

## 2025-04-17 RX ORDER — SPIRONOLACTONE 25 MG/1
1 TABLET ORAL DAILY
COMMUNITY
Start: 2025-03-25

## 2025-04-17 RX ORDER — THYROID 90 MG/1
TABLET ORAL
COMMUNITY

## 2025-04-17 RX ORDER — DILTIAZEM HYDROCHLORIDE 30 MG/1
1 TABLET, FILM COATED ORAL 2 TIMES DAILY
COMMUNITY
Start: 2025-03-25

## 2025-04-17 RX ORDER — SPIRONOLACTONE 25 MG/1
TABLET ORAL
COMMUNITY

## 2025-04-17 RX ORDER — METRONIDAZOLE 500 MG/1
30 TABLET ORAL
COMMUNITY
Start: 2024-10-01

## 2025-04-17 RX ORDER — AZITHROMYCIN 250 MG/1
250 TABLET, FILM COATED ORAL
COMMUNITY
Start: 2025-04-14

## 2025-04-17 RX ORDER — LEVOFLOXACIN 500 MG/1
10 TABLET, FILM COATED ORAL
COMMUNITY
Start: 2024-10-01

## 2025-04-17 RX ORDER — BIMATOPROST 0.1 MG/ML
1 SOLUTION/ DROPS OPHTHALMIC
COMMUNITY
Start: 2025-02-18

## 2025-04-17 RX ORDER — VALACYCLOVIR HYDROCHLORIDE 1 G/1
TABLET, FILM COATED ORAL
COMMUNITY

## 2025-04-17 RX ORDER — DOXYCYCLINE 100 MG/1
1 CAPSULE ORAL 2 TIMES DAILY
COMMUNITY
Start: 2024-07-29 | End: 2025-07-29

## 2025-04-17 NOTE — PROGRESS NOTES
"Subjective:      Patient ID: Alma Jackson is a 64 y.o. female.    Vitals:  height is 5' 3" (1.6 m) and weight is 66.7 kg (147 lb 0.8 oz). Her oral temperature is 98.2 °F (36.8 °C). Her blood pressure is 141/76 (abnormal) and her pulse is 71. Her respiration is 20 and oxygen saturation is 96%.     Chief Complaint: Fatigue    Pt states she has nasal congestion, left arm that is red and hot, and fatigue that started four days ago. Pt reports being treated for these symptoms with an antibiotic and steroid shot, but the symptoms persists. Pt thinks she may have had an allergic reaction to her medication because she was awake for 36 hours.  Pt tested negative for Covid and Flu three days ago.    Fatigue  This is a chronic problem. The current episode started in the past 7 days. The problem has been unchanged. Associated symptoms include congestion and fatigue. Nothing aggravates the symptoms. Treatments tried: antibiotic, steroid shot. The treatment provided mild relief.       Constitution: Positive for fatigue.   HENT:  Positive for congestion.    Skin:  Positive for erythema.      Objective:     Physical Exam   Constitutional: She does not appear ill. No distress. normal  HENT:   Head: Normocephalic and atraumatic.   Nose: Congestion present.   Neck: Neck supple.   Cardiovascular: Normal rate, regular rhythm, normal heart sounds and normal pulses.   Pulmonary/Chest: Effort normal and breath sounds normal. She has no wheezes.   Abdominal: Normal appearance.   Neurological: She is alert.   Skin: Skin is warm and rash (left upper arm maculo-papular lesion with occasional excoriations and fine papules). erythema No bruising and No lesion   Nursing note and vitals reviewed.    Assessment:     1. Allergic contact dermatitis, unspecified trigger    2. Upper respiratory tract infection, unspecified type        Plan:       Allergic contact dermatitis, unspecified trigger  -     predniSONE (DELTASONE) 20 MG tablet; " Take 2 tablets (40 mg total) by mouth once daily. for 5 days  Dispense: 10 tablet; Refill: 0    Upper respiratory tract infection, unspecified type    Continue medications as prescribed by ENT with exception azithromycin which chart reports an allergy

## 2025-04-30 ENCOUNTER — OFFICE VISIT (OUTPATIENT)
Dept: ENDOCRINOLOGY | Facility: CLINIC | Age: 65
End: 2025-04-30
Payer: COMMERCIAL

## 2025-04-30 VITALS
OXYGEN SATURATION: 97 % | BODY MASS INDEX: 26.39 KG/M2 | WEIGHT: 148.94 LBS | HEART RATE: 76 BPM | SYSTOLIC BLOOD PRESSURE: 132 MMHG | DIASTOLIC BLOOD PRESSURE: 74 MMHG | HEIGHT: 63 IN

## 2025-04-30 DIAGNOSIS — E07.9 THYROID EYE DISEASE: ICD-10-CM

## 2025-04-30 DIAGNOSIS — E89.0 POSTABLATIVE HYPOTHYROIDISM: Primary | ICD-10-CM

## 2025-04-30 DIAGNOSIS — E55.9 VITAMIN D DEFICIENCY: ICD-10-CM

## 2025-04-30 DIAGNOSIS — H57.89 THYROID EYE DISEASE: ICD-10-CM

## 2025-04-30 DIAGNOSIS — M85.80 OSTEOPENIA, UNSPECIFIED LOCATION: ICD-10-CM

## 2025-04-30 PROCEDURE — 1101F PT FALLS ASSESS-DOCD LE1/YR: CPT | Mod: CPTII,S$GLB,, | Performed by: STUDENT IN AN ORGANIZED HEALTH CARE EDUCATION/TRAINING PROGRAM

## 2025-04-30 PROCEDURE — 99214 OFFICE O/P EST MOD 30 MIN: CPT | Mod: S$GLB,,, | Performed by: STUDENT IN AN ORGANIZED HEALTH CARE EDUCATION/TRAINING PROGRAM

## 2025-04-30 PROCEDURE — 3078F DIAST BP <80 MM HG: CPT | Mod: CPTII,S$GLB,, | Performed by: STUDENT IN AN ORGANIZED HEALTH CARE EDUCATION/TRAINING PROGRAM

## 2025-04-30 PROCEDURE — 1159F MED LIST DOCD IN RCRD: CPT | Mod: CPTII,S$GLB,, | Performed by: STUDENT IN AN ORGANIZED HEALTH CARE EDUCATION/TRAINING PROGRAM

## 2025-04-30 PROCEDURE — 3288F FALL RISK ASSESSMENT DOCD: CPT | Mod: CPTII,S$GLB,, | Performed by: STUDENT IN AN ORGANIZED HEALTH CARE EDUCATION/TRAINING PROGRAM

## 2025-04-30 PROCEDURE — 3075F SYST BP GE 130 - 139MM HG: CPT | Mod: CPTII,S$GLB,, | Performed by: STUDENT IN AN ORGANIZED HEALTH CARE EDUCATION/TRAINING PROGRAM

## 2025-04-30 PROCEDURE — 1125F AMNT PAIN NOTED PAIN PRSNT: CPT | Mod: CPTII,S$GLB,, | Performed by: STUDENT IN AN ORGANIZED HEALTH CARE EDUCATION/TRAINING PROGRAM

## 2025-04-30 PROCEDURE — 1160F RVW MEDS BY RX/DR IN RCRD: CPT | Mod: CPTII,S$GLB,, | Performed by: STUDENT IN AN ORGANIZED HEALTH CARE EDUCATION/TRAINING PROGRAM

## 2025-04-30 PROCEDURE — 3008F BODY MASS INDEX DOCD: CPT | Mod: CPTII,S$GLB,, | Performed by: STUDENT IN AN ORGANIZED HEALTH CARE EDUCATION/TRAINING PROGRAM

## 2025-04-30 PROCEDURE — 3044F HG A1C LEVEL LT 7.0%: CPT | Mod: CPTII,S$GLB,, | Performed by: STUDENT IN AN ORGANIZED HEALTH CARE EDUCATION/TRAINING PROGRAM

## 2025-04-30 PROCEDURE — 99999 PR PBB SHADOW E&M-EST. PATIENT-LVL V: CPT | Mod: PBBFAC,,, | Performed by: STUDENT IN AN ORGANIZED HEALTH CARE EDUCATION/TRAINING PROGRAM

## 2025-04-30 PROCEDURE — G2211 COMPLEX E/M VISIT ADD ON: HCPCS | Mod: S$GLB,,, | Performed by: STUDENT IN AN ORGANIZED HEALTH CARE EDUCATION/TRAINING PROGRAM

## 2025-04-30 NOTE — PROGRESS NOTES
"Endocrinology Return Visit  04/30/2025    Subjective:      Patient ID: Alma Jackson is a 64 y.o. female.    Chief Complaint:  Follow-up and Hypothyroidism      History of Present Illness  Ms. Jackson presents for follow up of hypothyroidism, osteopenia and vitamin D deficiency.   Last visit in 6/2024.     Has active history of hypothyroidism, vit D def, osteopenia, and menopause on HRT.      Last seen by Dr Sarmiento in 6/2024. At that time no changes were made in NP Thyroid.      She has history of H. pylori, initially presenting with sensation of pill lodging in throat. She required multiple rounds of antibiotics after failing initial breath test. Post-treatment, she experiences 3-4 bowel movements daily, with stool consistency varying between normal and rabbit pellet-like. She denies constipation.       In regards to hypothyroidism   Has history of Grave's disease s/p total thyroidectomy and LICEA ablation several years ago. 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 Carreon in 2012.   She now follows with a retina specialist - Dr Stone and is considering Tepezza. Planning to start this after [place]ol[place]      Lab Results   Component Value Date    TSH 3.433 01/14/2025     Currently on [place] thyroid 90 mg 6 days per week.   No overt fatigue. 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 Has mild intermittent tremor in hands.      Had H pylori -- initially p/w dysphagia and saw her GI doctor who did a scope and found H pylori  Completed ABX then did test of cure - failed it, then repeated tx course and then had normal test of cure after that   Since then BM have been different - having multiple Bms per day, sometimes "rabbit pellets" and sometimes normal  She is concerned this may be linked to her thyroid    Weight stable but she is heavier than she has ever been before and is uncomfortable, she is unable to lose weight " despite increasing exercise and working on her diet   She reports being unable to lose weight despite regular exercise including walking and Pilates 4 times weekly. She uses a standing desk throughout the workday. The excess weight is causing hip discomfort and exacerbating lower back issues between L5 and S1.      With regards to osteopenia      current medication: none    Calcium intake?  1000 mg a day   Vit D intake?  1000 units once daily, does not remember to take it every single day  Weight bearing exercise?  Walks a fair amount and does pilates several times per week  Recent falls? No    No fractures      Last bone density 2/2024:  COMPARISON:  Comparison study done on 01/04/2022.     Lumbar spine:     BMD 1.198 g/cm2 and T-score 1.4.     Total Hip:           BMD 0.842 g/cm2 and T-score -0.8.     Distal 1/3 radius: Not applicable     FINDINGS:  Lumbar spine (L1-L4):               BMD is 1.227 g/cm2, T-score is 1.6, and Z-score is 3.4.     Total hip:                                BMD is 0.863 g/cm2, T-score is -0.6, and Z-score is 0.6.     Femoral neck:                          BMD is 0.702 g/cm2, T-score is -1.3, and Z-score is 0.2.     Distal 1/3 radius:                      Not applicable     FRAX:     8.5% risk of a major osteoporotic fracture in the next 10 years.     0.8% risk of hip fracture in the next 10 years.     Impression:     *Low bone mass (Osteopenia); FRAX calculations do not support treatment as osteoporosis  *Fracture risk is low  *Compared with previous DXA, BMD at the lumbar spine has increased by 2.4%, and BMD at the total hip has remained stable         With regards to the vitamin d deficiency:  Currently taking 1000 units of Vitamin D3 daily      ROS: As above     Objective:   Physical Exam  Constitutional:       General: She is not in acute distress.     Appearance: Normal appearance. She is not ill-appearing.   Eyes:      Comments: +b/l exophthalmus    Cardiovascular:      Rate and  Rhythm: Normal rate.   Pulmonary:      Effort: Pulmonary effort is normal.   Neurological:      Mental Status: She is alert and oriented to person, place, and time.   Psychiatric:         Mood and Affect: Mood normal.         Behavior: Behavior normal.       BP Readings from Last 3 Encounters:   04/30/25 132/74   04/17/25 (!) 141/76   01/13/25 127/78     Wt Readings from Last 1 Encounters:   04/30/25 1507 67.5 kg (148 lb 14.7 oz)       Body mass index is 26.38 kg/m².    Lab Review:   Lab Results   Component Value Date    HGBA1C 5.1 01/14/2025     Lab Results   Component Value Date    CHOL 212 (H) 01/14/2025    HDL 47 01/14/2025    LDLCALC 133.2 01/14/2025    TRIG 159 (H) 01/14/2025    CHOLHDL 22.2 01/14/2025     Lab Results   Component Value Date     01/14/2025    K 4.4 01/14/2025     01/14/2025    CO2 27 01/14/2025    GLU 90 01/14/2025    BUN 13 01/14/2025    CREATININE 0.7 01/14/2025    CALCIUM 9.3 01/14/2025    PROT 7.0 01/14/2025    ALBUMIN 3.8 01/14/2025    BILITOT 0.3 01/14/2025    ALKPHOS 65 01/14/2025    AST 19 01/14/2025    ALT 22 01/14/2025    ANIONGAP 12 01/14/2025    ESTGFRAFRICA >60 10/13/2020    EGFRNONAA >60 10/13/2020    TSH 3.433 01/14/2025         Assessment and Plan     1. Postablative hypothyroidism    2. Osteopenia, unspecified location  -     DXA Bone Density Axial Skeleton 1 or more sites; Future; Expected date: 02/28/2026    3. Thyroid eye disease - Both Eyes    4. Vitamin D deficiency        Assessment & Plan      HYPOTHYROIDISM:  - Biochemically euthyroid on stable dose of [place] for years.  - Reviewed thyroid function with normal TSH from January, indicating current [place] Thyroid dose remains appropriate; explained that with normal TSH, symptoms are likely unrelated to thyroid function.  - Continue [place] Thyroid 90 mg 6 days per week.  - Digestive issues post H. pylori treatment likely unrelated to thyroid function given normal TSH.  - Discussed risks of over treatment  including accelerated bone loss and arrhythmias   - She states does not need Rx refill at this time and would prefer I do not send it not but she will let us know when she does need a refill.  - for KAILEE -- she is following with Dr Stone and is planning for Tepezza after [place]       OSTEOPENIA  - Osteopenia on DXA in 2/2024  - Reassuring she is not fracturing and no recent falls  - Next DXA due in 2/2026 at same location she did last one ([facility])  - Continue Vitamin D 1,000 IU daily.and Ca with goal 1200 mg daily, dietary sources preferred with supplement added if not meeting goal with diet alone    WEIGHT MANAGEMENT:  - She is interested in pursuing weight loss medication with PCP but asks if there is any reason she cannot  take GLP1 agonists from my perspective. She denies hx of pancreatitis and denies personal and Fhx of MTC.  No endocrine contraindications for weight loss medications like Mounjaro or alternatives (Liraglutide, Ozempic); clarified misconceptions about thyroid conditions and eligibility for weight loss medications.      DXA in 2/2026 in [facility] at [facility] 1yr    Radha Caraballo MD  Endocrinology    Visit today included increased complexity associated with the care of the problems addressed and managing the longitudinal care of the patient due to the serious and/or complex managed problems    This note was generated with the assistance of ambient listening technology. Verbal consent was obtained by the patient and accompanying visitor(s) for the recording of patient appointment to facilitate this note. I attest to having reviewed and edited the generated note for accuracy, though some syntax or spelling errors may persist. Please contact the author of this note for any clarification.

## 2025-04-30 NOTE — Clinical Note
"Heads up - she wants to see you in person "ASAP" and will likely schedule next available even though I told her  she does not need to follow up until 1yr."

## 2025-04-30 NOTE — PATIENT INSTRUCTIONS
Continue [place] 90 mg 6 days per week    Goal normal TSH  TSH should be checked once per year -- can be checked sooner for new symptoms concerning for thyroid dysfunction.    For weight change of more than 10-15 lbs TSH can be checked sooner.    DXA scan (bone density scan) due in Feb 2026 at the same place you did the previous scan      Follow up in 1 year

## 2025-05-06 ENCOUNTER — TELEPHONE (OUTPATIENT)
Dept: FAMILY MEDICINE | Facility: CLINIC | Age: 65
End: 2025-05-06
Payer: COMMERCIAL

## 2025-05-06 ENCOUNTER — OFFICE VISIT (OUTPATIENT)
Dept: FAMILY MEDICINE | Facility: CLINIC | Age: 65
End: 2025-05-06
Attending: FAMILY MEDICINE
Payer: COMMERCIAL

## 2025-05-06 DIAGNOSIS — R35.0 URINARY FREQUENCY: Primary | ICD-10-CM

## 2025-05-06 RX ORDER — CIPROFLOXACIN 500 MG/1
500 TABLET, FILM COATED ORAL EVERY 12 HOURS
Qty: 20 TABLET | Refills: 0 | Status: SHIPPED | OUTPATIENT
Start: 2025-05-06 | End: 2025-05-10 | Stop reason: ALTCHOICE

## 2025-05-06 NOTE — TELEPHONE ENCOUNTER
----- Message from Gudelia sent at 5/6/2025 10:45 AM CDT -----  Type : Patient Call  Who Called : Patient   Does the patient know what this is regarding?: Pt is requesting orders for an urinalysis test. Pt is stating she may have a possible UTI . Pt would like the order put in today so she can go to the [facility] lab after 1, for the pt to have meds called in on today as well .  Please Advise   Would the patient rather a call back or a response via [facility]? Call   Best Call Back Number: [phone]  Additional Information:

## 2025-05-07 ENCOUNTER — LAB VISIT (OUTPATIENT)
Dept: LAB | Facility: HOSPITAL | Age: 65
End: 2025-05-07
Attending: FAMILY MEDICINE
Payer: COMMERCIAL

## 2025-05-07 DIAGNOSIS — R35.0 URINARY FREQUENCY: ICD-10-CM

## 2025-05-07 PROCEDURE — 87086 URINE CULTURE/COLONY COUNT: CPT

## 2025-05-08 ENCOUNTER — E-VISIT (OUTPATIENT)
Dept: FAMILY MEDICINE | Facility: CLINIC | Age: 65
End: 2025-05-08
Attending: FAMILY MEDICINE
Payer: COMMERCIAL

## 2025-05-08 ENCOUNTER — PATIENT MESSAGE (OUTPATIENT)
Dept: FAMILY MEDICINE | Facility: CLINIC | Age: 65
End: 2025-05-08

## 2025-05-08 DIAGNOSIS — Z53.8 APPOINTMENT CANCELED BY HOSPITAL: Primary | ICD-10-CM

## 2025-05-09 LAB — BACTERIA UR CULT: ABNORMAL

## 2025-05-10 ENCOUNTER — OFFICE VISIT (OUTPATIENT)
Dept: URGENT CARE | Facility: CLINIC | Age: 65
End: 2025-05-10
Payer: COMMERCIAL

## 2025-05-10 VITALS
SYSTOLIC BLOOD PRESSURE: 149 MMHG | OXYGEN SATURATION: 95 % | RESPIRATION RATE: 20 BRPM | TEMPERATURE: 98 F | HEIGHT: 63 IN | BODY MASS INDEX: 26.37 KG/M2 | DIASTOLIC BLOOD PRESSURE: 85 MMHG | WEIGHT: 148.81 LBS | HEART RATE: 77 BPM

## 2025-05-10 DIAGNOSIS — N30.00 ACUTE CYSTITIS WITHOUT HEMATURIA: Primary | ICD-10-CM

## 2025-05-10 PROCEDURE — 99214 OFFICE O/P EST MOD 30 MIN: CPT | Mod: S$GLB,,, | Performed by: NURSE PRACTITIONER

## 2025-05-10 RX ORDER — NITROFURANTOIN 25; 75 MG/1; MG/1
100 CAPSULE ORAL 2 TIMES DAILY
Qty: 10 CAPSULE | Refills: 0 | Status: SHIPPED | OUTPATIENT
Start: 2025-05-10 | End: 2025-05-15

## 2025-05-10 NOTE — PATIENT INSTRUCTIONS
UTI (Urinary Tract Infection)      Stop cipro.    -     nitrofurantoin, macrocrystal-monohydrate, (MACROBID) 100 MG capsule; Take 1 capsule (100 mg total) by mouth 2 (two) times daily. for 5 days  Dispense: 10 capsule; Refill: 0      Cranberry juice may help. Get the 100% cranberry juice and mix 4 oz of juice with 4 oz of water and drink this 8 oz glass of liquid once a day.     Increase water intake to at least 8-10 glasses/day.    Avoid caffeine, alcohol, or spicy foods as they irritate the bladder.      If you take OTC AZO/Pyridium/Phenazopyridine, follow instructions as directed.  Do NOT take for more than 2 days.  Do not wear contacts with Pyridium as it will stain them.  You may want to wear a panty liner with Pyridium as it may stain your underwear.      If you are are female and on birth control pills use additional methods (such as condom use) to prevent pregnancy while on the antibiotics and for one cycle after.     If your condition worsens or fails to improve we recommend that you receive another evaluation at the ER immediately or contact your PCP to discuss your concerns or return here.

## 2025-05-10 NOTE — PROGRESS NOTES
"Subjective:      Patient ID: Alma Jackson is a 64 y.o. female.    Vitals:  height is 5' 2.99" (1.6 m) and weight is 67.5 kg (148 lb 13 oz). Her oral temperature is 98.1 °F (36.7 °C). Her blood pressure is 149/85 (abnormal) and her pulse is 77. Her respiration is 20 and oxygen saturation is 95%.     Chief Complaint: Dysuria    Pt is 65 yo female c/o uti.   Pt states she recently saw her PCP and she was diagnosed with a UTI.  She has been taking cipro with no improvement in symptoms and urine cx resulted stating resistance to cipro.    Dysuria   This is a new problem. The current episode started in the past 7 days. The problem has been gradually worsening. Associated symptoms include frequency and urgency. Pertinent negatives include no chills, flank pain, hematuria, nausea or vomiting. She has tried antibiotics for the symptoms.       Constitution: Negative for chills, fatigue and fever.   Cardiovascular:  Negative for chest pain.   Respiratory:  Negative for shortness of breath.    Gastrointestinal:  Negative for nausea, vomiting and diarrhea.   Genitourinary:  Positive for dysuria, frequency and urgency. Negative for flank pain, hematuria, vaginal discharge and vaginal odor.   Neurological:  Negative for headaches.      Objective:     Physical Exam   Constitutional: She is oriented to person, place, and time.   Cardiovascular: Normal rate and regular rhythm.   Pulmonary/Chest: Effort normal and breath sounds normal.   Abdominal: Bowel sounds are normal. Soft. flat abdomen There is no abdominal tenderness. There is no left CVA tenderness and no right CVA tenderness.   Neurological: She is alert and oriented to person, place, and time.   Skin: Skin is warm and dry.   Vitals reviewed.      Assessment:     1. Acute cystitis without hematuria      NP reviewed multiple prior notes and lab results.    Plan:       Acute cystitis without hematuria  -     nitrofurantoin, macrocrystal-monohydrate, (MACROBID) 100 " Marito Springer MD  Signing Provider: Marito Patrick.  Yessi Sprniger MD    [facility] ([id number])  DD: 12/02/2020 14:33:49 TD: 12/03/2020 06:19:19    Copy Sent To: MG capsule; Take 1 capsule (100 mg total) by mouth 2 (two) times daily. for 5 days  Dispense: 10 capsule; Refill: 0      Patient Instructions     UTI (Urinary Tract Infection)      Stop cipro.    -     nitrofurantoin, macrocrystal-monohydrate, (MACROBID) 100 MG capsule; Take 1 capsule (100 mg total) by mouth 2 (two) times daily. for 5 days  Dispense: 10 capsule; Refill: 0      Cranberry juice may help. Get the 100% cranberry juice and mix 4 oz of juice with 4 oz of water and drink this 8 oz glass of liquid once a day.     Increase water intake to at least 8-10 glasses/day.    Avoid caffeine, alcohol, or spicy foods as they irritate the bladder.      If you take OTC AZO/Pyridium/Phenazopyridine, follow instructions as directed.  Do NOT take for more than 2 days.  Do not wear contacts with Pyridium as it will stain them.  You may want to wear a panty liner with Pyridium as it may stain your underwear.      If you are are female and on birth control pills use additional methods (such as condom use) to prevent pregnancy while on the antibiotics and for one cycle after.     If your condition worsens or fails to improve we recommend that you receive another evaluation at the ER immediately or contact your PCP to discuss your concerns or return here.

## 2025-05-12 ENCOUNTER — CLINICAL SUPPORT (OUTPATIENT)
Dept: OBSTETRICS AND GYNECOLOGY | Facility: CLINIC | Age: 65
End: 2025-05-12
Payer: COMMERCIAL

## 2025-05-12 DIAGNOSIS — N95.1 MENOPAUSAL SYMPTOMS: Primary | ICD-10-CM

## 2025-05-12 PROCEDURE — 96372 THER/PROPH/DIAG INJ SC/IM: CPT | Mod: S$GLB,,, | Performed by: OBSTETRICS & GYNECOLOGY

## 2025-05-12 PROCEDURE — 99999 PR PBB SHADOW E&M-EST. PATIENT-LVL I: CPT | Mod: PBBFAC,,,

## 2025-05-12 RX ADMIN — TESTOSTERONE CYPIONATE 76 MG: 200 INJECTION, SOLUTION INTRAMUSCULAR at 09:05

## 2025-05-12 RX ADMIN — ESTRADIOL VALERATE 10 MG: 40 INJECTION INTRAMUSCULAR at 09:05

## 2025-05-31 ENCOUNTER — PATIENT MESSAGE (OUTPATIENT)
Dept: FAMILY MEDICINE | Facility: CLINIC | Age: 65
End: 2025-05-31
Payer: COMMERCIAL

## 2025-05-31 VITALS — WEIGHT: 147.69 LBS | BODY MASS INDEX: 26.17 KG/M2 | HEIGHT: 63 IN

## 2025-05-31 NOTE — PROGRESS NOTES
The patient location is: [place]  The chief complaint leading to consultation is: uti    Visit type: audiovisual    Face to Face time with patient: 15  20 minutes of total time spent on the encounter, which includes face to face time and non-face to face time preparing to see the patient (eg, review of tests), Obtaining and/or reviewing separately obtained history, Documenting clinical information in the electronic or other health record, Independently interpreting results (not separately reported) and communicating results to the patient/family/caregiver, or Care coordination (not separately reported).         Each patient to whom he or she provides medical services by telemedicine is:  (1) informed of the relationship between the physician and patient and the respective role of any other health care provider with respect to management of the patient; and (2) notified that he or she may decline to receive medical services by telemedicine and may withdraw from such care at any time.    Notes:                   Subjective:       Patient ID: Alma Jackson is a 64 y.o. female.    Chief Complaint: uti  Urinary Tract Infection   Associated symptoms include frequency and urgency. Pertinent negatives include no behavior changes, chills, discharge, flank pain, hematuria, hesitancy, nausea, possible pregnancy, sweats, vomiting, weight loss, constipation, rash or withholding. Her past medical history is significant for catheterization, hypertension and STD. There is no history of diabetes insipidus, diabetes mellitus, genitourinary reflux, kidney stones, recurrent UTIs, a single kidney, urinary stasis or a urological procedure.   Dysuria   This is a recurrent problem. The current episode started yesterday. The problem occurs every urination. The problem has been unchanged. The quality of the pain is described as aching. The pain is at a severity of 4/10. The pain is mild. There has been no fever. She is Sexually  "active. There is No history of pyelonephritis. Associated symptoms include frequency and urgency. Pertinent negatives include no behavior changes, chills, discharge, flank pain, hematuria, hesitancy, nausea, possible pregnancy, sweats, vomiting, weight loss, constipation, rash or withholding. She has tried acetaminophen and increased fluids for the symptoms. The treatment provided no relief. Her past medical history is significant for catheterization, hypertension and STD. There is no history of diabetes insipidus, diabetes mellitus, genitourinary reflux, kidney stones, recurrent UTIs, a single kidney, urinary stasis or a urological procedure.     Pt is in virtual visit for c/o dysuria frequency no flank pain no n/v/f/c/d/c   Review of Systems   Constitutional:  Negative for chills, fatigue, fever and weight loss.   Respiratory:  Negative for cough, chest tightness and shortness of breath.    Cardiovascular:  Negative for chest pain and palpitations.   Gastrointestinal:  Negative for constipation, nausea and vomiting.   Genitourinary:  Positive for dysuria, frequency and urgency. Negative for flank pain, hematuria and hesitancy.   Skin:  Negative for rash.       Objective:    Ht 5' 3" (1.6 m)   Wt 67 kg (147 lb 11.3 oz)   LMP  (LMP Unknown)   BMI 26.17 kg/m²       Physical Exam  Constitutional:       Appearance: Normal appearance. She is not ill-appearing.   HENT:      Head: Normocephalic and atraumatic.   Pulmonary:      Effort: Pulmonary effort is normal. No respiratory distress.   Neurological:      General: No focal deficit present.      Mental Status: She is alert and oriented to person, place, and time.      Cranial Nerves: No cranial nerve deficit.      Coordination: Coordination normal.   Psychiatric:         Mood and Affect: Mood normal.         Behavior: Behavior normal.         Thought Content: Thought content normal.         Judgment: Judgment normal.         Assessment:       1. Urinary frequency     " "   Plan:     Orders urine culture  Start cipro change abx if culture dictates  Increase water intake  Add cranberry juice  Rtc for hannah in 2-4 weeks       "This note will not be shared with the patient."         "

## 2025-06-02 ENCOUNTER — LAB VISIT (OUTPATIENT)
Dept: LAB | Facility: HOSPITAL | Age: 65
End: 2025-06-02
Attending: OBSTETRICS & GYNECOLOGY
Payer: COMMERCIAL

## 2025-06-02 DIAGNOSIS — N95.1 MENOPAUSAL SYMPTOMS: ICD-10-CM

## 2025-06-02 DIAGNOSIS — R35.0 URINARY FREQUENCY: ICD-10-CM

## 2025-06-02 LAB
ESTRADIOL SERPL HS-MCNC: 47 PG/ML
TESTOST SERPL-MCNC: 208 NG/DL (ref 5–73)

## 2025-06-02 PROCEDURE — 84403 ASSAY OF TOTAL TESTOSTERONE: CPT

## 2025-06-02 PROCEDURE — 36415 COLL VENOUS BLD VENIPUNCTURE: CPT | Mod: PO

## 2025-06-02 PROCEDURE — 84402 ASSAY OF FREE TESTOSTERONE: CPT

## 2025-06-02 PROCEDURE — 87086 URINE CULTURE/COLONY COUNT: CPT

## 2025-06-02 PROCEDURE — 82670 ASSAY OF TOTAL ESTRADIOL: CPT

## 2025-06-03 LAB — BACTERIA UR CULT: NORMAL

## 2025-06-05 LAB — W FREE TESTOSTERONE: 2.2 PG/ML

## 2025-06-06 ENCOUNTER — RESULTS FOLLOW-UP (OUTPATIENT)
Dept: OBSTETRICS AND GYNECOLOGY | Facility: CLINIC | Age: 65
End: 2025-06-06

## 2025-06-06 ENCOUNTER — TELEPHONE (OUTPATIENT)
Dept: OBSTETRICS AND GYNECOLOGY | Facility: CLINIC | Age: 65
End: 2025-06-06
Payer: COMMERCIAL

## 2025-06-06 NOTE — PROGRESS NOTES
Ok to continue Del 10, T76 unless any side effects such as thinning head hair, or increased facial/body hair.

## 2025-06-09 ENCOUNTER — CLINICAL SUPPORT (OUTPATIENT)
Dept: OBSTETRICS AND GYNECOLOGY | Facility: CLINIC | Age: 65
End: 2025-06-09
Payer: COMMERCIAL

## 2025-06-09 DIAGNOSIS — N95.1 MENOPAUSAL SYMPTOMS: Primary | ICD-10-CM

## 2025-06-09 PROCEDURE — 96372 THER/PROPH/DIAG INJ SC/IM: CPT | Mod: S$GLB,,, | Performed by: OBSTETRICS & GYNECOLOGY

## 2025-06-09 PROCEDURE — 99999 PR PBB SHADOW E&M-EST. PATIENT-LVL I: CPT | Mod: PBBFAC,,,

## 2025-06-09 RX ORDER — TESTOSTERONE CYPIONATE 200 MG/ML
76 INJECTION, SOLUTION INTRAMUSCULAR
Status: SHIPPED | OUTPATIENT
Start: 2025-06-09 | End: 2025-11-24

## 2025-06-09 RX ORDER — ESTRADIOL VALERATE 20 MG/ML
10 INJECTION INTRAMUSCULAR
Status: SHIPPED | OUTPATIENT
Start: 2025-06-09 | End: 2025-12-06

## 2025-06-09 RX ADMIN — ESTRADIOL VALERATE 10 MG: 20 INJECTION INTRAMUSCULAR at 08:06

## 2025-06-09 RX ADMIN — TESTOSTERONE CYPIONATE 76 MG: 200 INJECTION, SOLUTION INTRAMUSCULAR at 08:06

## 2025-06-09 NOTE — PROGRESS NOTES
Here for hormone therapy injection, no complaints at this time, Injection given as ordered, tolerated well, no report of pain prior to or after injection. Return to clinic as scheduled.     Site -LB    Testosterone 76 mg  Delestrogen 10 mg    Clinic Supplied Medication

## 2025-06-13 ENCOUNTER — PATIENT MESSAGE (OUTPATIENT)
Dept: FAMILY MEDICINE | Facility: CLINIC | Age: 65
End: 2025-06-13
Payer: COMMERCIAL

## 2025-06-23 ENCOUNTER — PATIENT MESSAGE (OUTPATIENT)
Dept: ENDOCRINOLOGY | Facility: CLINIC | Age: 65
End: 2025-06-23
Payer: COMMERCIAL

## 2025-06-23 DIAGNOSIS — E89.0 POSTABLATIVE HYPOTHYROIDISM: Primary | ICD-10-CM

## 2025-06-24 ENCOUNTER — LAB VISIT (OUTPATIENT)
Dept: LAB | Facility: HOSPITAL | Age: 65
End: 2025-06-24
Payer: COMMERCIAL

## 2025-06-24 DIAGNOSIS — E89.0 POSTABLATIVE HYPOTHYROIDISM: ICD-10-CM

## 2025-06-24 PROCEDURE — 84480 ASSAY TRIIODOTHYRONINE (T3): CPT

## 2025-06-24 PROCEDURE — 36415 COLL VENOUS BLD VENIPUNCTURE: CPT | Mod: PO

## 2025-06-24 PROCEDURE — 84443 ASSAY THYROID STIM HORMONE: CPT

## 2025-06-24 PROCEDURE — 84439 ASSAY OF FREE THYROXINE: CPT

## 2025-06-25 ENCOUNTER — RESULTS FOLLOW-UP (OUTPATIENT)
Dept: ENDOCRINOLOGY | Facility: CLINIC | Age: 65
End: 2025-06-25

## 2025-06-25 LAB
T3FREE SERPL-MCNC: 140 NG/DL (ref 60–180)
T4 FREE SERPL-MCNC: 0.75 NG/DL (ref 0.71–1.51)
TSH SERPL-ACNC: 1.37 UIU/ML (ref 0.4–4)

## 2025-07-08 ENCOUNTER — OFFICE VISIT (OUTPATIENT)
Dept: OBSTETRICS AND GYNECOLOGY | Facility: CLINIC | Age: 65
End: 2025-07-08
Attending: OBSTETRICS & GYNECOLOGY
Payer: COMMERCIAL

## 2025-07-08 ENCOUNTER — CLINICAL SUPPORT (OUTPATIENT)
Dept: OBSTETRICS AND GYNECOLOGY | Facility: CLINIC | Age: 65
End: 2025-07-08
Payer: COMMERCIAL

## 2025-07-08 VITALS
HEART RATE: 82 BPM | DIASTOLIC BLOOD PRESSURE: 85 MMHG | BODY MASS INDEX: 25.52 KG/M2 | HEIGHT: 63 IN | SYSTOLIC BLOOD PRESSURE: 132 MMHG | WEIGHT: 144 LBS

## 2025-07-08 DIAGNOSIS — N95.1 MENOPAUSAL SYNDROME: Primary | ICD-10-CM

## 2025-07-08 DIAGNOSIS — N95.1 MENOPAUSAL SYNDROME: ICD-10-CM

## 2025-07-08 DIAGNOSIS — N95.8 GENITOURINARY SYNDROME OF MENOPAUSE: ICD-10-CM

## 2025-07-08 DIAGNOSIS — Z01.419 ENCOUNTER FOR GYNECOLOGICAL EXAMINATION WITHOUT ABNORMAL FINDING: Primary | ICD-10-CM

## 2025-07-08 PROCEDURE — 99999 PR PBB SHADOW E&M-EST. PATIENT-LVL I: CPT | Mod: PBBFAC,,,

## 2025-07-08 PROCEDURE — 99999 PR PBB SHADOW E&M-EST. PATIENT-LVL IV: CPT | Mod: PBBFAC,,, | Performed by: OBSTETRICS & GYNECOLOGY

## 2025-07-08 RX ORDER — FINASTERIDE 5 MG/1
5 TABLET, FILM COATED ORAL DAILY
COMMUNITY
Start: 2025-06-23

## 2025-07-08 RX ORDER — ESTRADIOL 0.1 MG/G
1 CREAM VAGINAL
Qty: 42 G | Refills: 3 | Status: SHIPPED | OUTPATIENT
Start: 2025-07-09

## 2025-07-08 RX ADMIN — TESTOSTERONE CYPIONATE 76 MG: 200 INJECTION, SOLUTION INTRAMUSCULAR at 10:07

## 2025-07-08 RX ADMIN — ESTRADIOL VALERATE 10 MG: 20 INJECTION INTRAMUSCULAR at 10:07

## 2025-07-08 NOTE — PROGRESS NOTES
Subjective:       Patient ID: Alma Jackson is a 64 y.o. female.    Chief Complaint:  Well Woman and Gynecologic Exam      History of Present Illness  HPI  Alma Jackson is a 64 y.o. female  here for her annual GYN exam.       She is menopausal since age 44 and has been on HRT since then.  Had a TLHBSO for prolapse in 2018. Feels well on this and wants to continue. She states she is being treated by Dr Alan for H. Pylori. She is to begin Pelvic Floor PT.   Reports that libido is normal. Feels well overall. She is up to date with her PCP and labs.  Current HRT:   Delestrogen 10mg IM monthly  Testosterone 76mg IM monthly  Recent labs: Free Test: 2.2,  Total Test: 208, Estradiol : 47  denies vaginal itching or irritation.  Denies vaginal discharge.  She is sexually active. She has had1 partner for the past 9 years .   History of abnormal pap: No  Last Pap: approximate date  and was normal  Last MMG: normal- -routine follow-up in 12 months  Last Dexa: 2024:Low bone mass (Osteopenia); FRAX calculations do not support treatment as osteoporosis  *Fracture risk is low  *Compared with previous DXA, BMD at the lumbar spine has increased by 2.4%, and BMD at the total hip has remained stable.     Last Colonoscopy:  2023, normal  denies domestic violence. She does feel safe at home.     Past Medical History:   Diagnosis Date    Cataract     Fibromyalgia     Hx of colonic polyps     Hyperlipidemia     Hypertension     Hyperthyroidism     Hypothyroidism     Irregular heart beat     Ophthalmic Graves disease      Past Surgical History:   Procedure Laterality Date    BREAST BIOPSY      LEFT     SECTION, CLASSIC      COLONOSCOPY N/A 2016    Procedure: COLONOSCOPY;  Surgeon: Benny Menchaca MD;  Location: [address]);  Service: Endoscopy;  Laterality: N/A;  requests Dr Menchaca    COLONOSCOPY N/A 2023    Procedure: COLONOSCOPY;  Surgeon: Saud Ballard MD;   Location: [facility] ENDO (4TH FLR);  Service: Endoscopy;  Laterality: N/A;  Ref by Dr SEAMUS Alicia, pt request Suprep, instr via portal - PC    CYSTOSCOPY N/A 12/10/2018    Procedure: CYSTOSCOPY;  Surgeon: Pinky Ko DO;  Location: [facility] OR;  Service: OB/GYN;  Laterality: N/A;    DILATION OF URETHRA N/A 12/10/2018    Procedure: DILATION, URETHRA;  Surgeon: Pinky Ko DO;  Location: [facility] OR;  Service: OB/GYN;  Laterality: N/A;    ENTEROCELE REPAIR N/A 12/10/2018    Procedure: REPAIR, ENTEROCELE;  Surgeon: Pinky Ko DO;  Location: [facility] OR;  Service: OB/GYN;  Laterality: N/A;    EYE SURGERY      LAPAROSCOPIC SALPINGO-OOPHORECTOMY Bilateral 12/10/2018    Procedure: SALPINGO-OOPHORECTOMY, LAPAROSCOPIC;  Surgeon: Pinky Ko DO;  Location: [facility] OR;  Service: OB/GYN;  Laterality: Bilateral;    LAPAROSCOPIC TOTAL HYSTERECTOMY N/A 12/10/2018    Procedure: HYSTERECTOMY, TOTAL, LAPAROSCOPIC; excision of umbilical mole;  Surgeon: Pinky Ko DO;  Location: [facility] OR;  Service: OB/GYN;  Laterality: N/A;    lasik ou      POSTERIOR REPAIR N/A 12/10/2018    Procedure: COLPORRHAPHY, POSTERIOR;  Surgeon: Pinky Ko DO;  Location: [facility] OR;  Service: OB/GYN;  Laterality: N/A;    thyroid radiation      TUBAL LIGATION       Social History[1]  Family History   Problem Relation Name Age of Onset    Heart failure Mother      Thyroid disease Mother      Stroke Mother      Parkinsonism Father      Hypertension Maternal Grandmother      Stroke Maternal Grandmother      Diabetes Maternal Grandfather      Stroke Paternal Grandfather      Amblyopia Neg Hx      Blindness Neg Hx      Cancer Neg Hx      Cataracts Neg Hx      Glaucoma Neg Hx      Macular degeneration Neg Hx      Retinal detachment Neg Hx      Strabismus Neg Hx      Breast cancer Neg Hx      Colon cancer Neg Hx      Ovarian cancer Neg Hx       OB History          4    Para   2    Term   2            AB   2     "Living   2         SAB   2    IAB        Ectopic        Multiple        Live Births   2                 /85 (Patient Position: Sitting)   Pulse 82   Ht 5' 3" (1.6 m)   Wt 65.3 kg (144 lb)   LMP  (LMP Unknown)   BMI 25.51 kg/m²         GYN & OB History    Date of Last Pap: 2018    OB History    Para Term  AB Living   4 2 2  2 2   SAB IAB Ectopic Multiple Live Births   2    2      # Outcome Date GA Lbr Arvind/2nd Weight Sex Type Anes PTL Lv   4 SAB            3 Term      Vag-Spont   NEAL   2 Term      CS-LTranv   NEAL   1 SAB                Review of Systems  Review of Systems   Constitutional:  Negative for activity change, appetite change, fatigue and unexpected weight change.   HENT: Negative.     Eyes:  Negative for visual disturbance.   Respiratory:  Negative for shortness of breath and wheezing.    Cardiovascular:  Negative for chest pain, palpitations and leg swelling.   Gastrointestinal:  Negative for abdominal pain, bloating and blood in stool.   Endocrine: Positive for hypothyroidism. Negative for diabetes and hair loss.   Genitourinary:  Negative for decreased libido and dyspareunia.   Musculoskeletal:  Negative for back pain and joint swelling.   Integumentary:  Negative for rash, acne, hair changes and nipple discharge.   Neurological:  Negative for headaches.   Hematological:  Does not bruise/bleed easily.   Psychiatric/Behavioral:  Negative for depression and sleep disturbance. The patient is not nervous/anxious.    Breast: Negative for mastodynia and nipple discharge          Objective:      Physical Exam:   Constitutional: She is oriented to person, place, and time. She appears well-developed and well-nourished.    HENT:   Head: Normocephalic and atraumatic.    Eyes: Pupils are equal, round, and reactive to light. EOM are normal.     Cardiovascular:  Normal rate and regular rhythm.             Pulmonary/Chest: Effort normal and breath sounds normal.   BREASTS:  no mass, no " tenderness, no deformity and no retraction. Right breast exhibits no inverted nipple, no mass, no nipple discharge, no skin change, no tenderness, no bleeding and no swelling. Left breast exhibits no inverted nipple, no mass, no nipple discharge, no skin change, no tenderness, no bleeding and no swelling. Breasts are symmetrical.              Abdominal: Soft. Bowel sounds are normal.     Genitourinary:    Pelvic exam was performed with patient supine.      Genitourinary Comments: PELVIC: Normal external female genitalia without lesions. Normal hair distribution. Adequate perineal body, normal urethral meatus. Vagina atrophic without lesions or discharge. No significant cystocele or rectocele. Bimanual exam shows uterus and cervix to be surgically absent. Adnexa without masses or tenderness.  RECTAL: Deferred                 Musculoskeletal: Normal range of motion and moves all extremeties.       Neurological: She is alert and oriented to person, place, and time.    Skin: Skin is warm and dry.    Psychiatric: She has a normal mood and affect.              Assessment:        1. Encounter for gynecological examination without abnormal finding    2. Menopausal syndrome    3. Genitourinary syndrome of menopause                Plan:      Problem List Items Addressed This Visit    None  Visit Diagnoses         Encounter for gynecological examination without abnormal finding    -  Primary      Menopausal syndrome        Relevant Orders    CBC Auto Differential      Genitourinary syndrome of menopause        Relevant Medications    estradioL (ESTRACE) 0.01 % (0.1 mg/gram) vaginal cream (Start on 2025)             Follow up in about 1 year (around 2026).              [1]   Social History  Socioeconomic History    Marital status: Significant Other   Tobacco Use    Smoking status: Every Day     Types: Vaping with nicotine     Last attempt to quit: 2011     Years since quittin.0     Passive exposure: Current     Smokeless tobacco: Never   Substance and Sexual Activity    Alcohol use: Yes     Alcohol/week: 0.0 standard drinks of alcohol     Comment: SOCIALLY    Drug use: No    Sexual activity: Yes     Partners: Male     Birth control/protection: None     Comment: partner since 2016, almost 9 years     Social Drivers of Health     Financial Resource Strain: Low Risk  (5/6/2025)    Overall Financial Resource Strain (CARDIA)     Difficulty of Paying Living Expenses: Not hard at all   Food Insecurity: No Food Insecurity (5/6/2025)    Hunger Vital Sign     Worried About Running Out of Food in the Last Year: Never true     Ran Out of Food in the Last Year: Never true   Transportation Needs: No Transportation Needs (5/6/2025)    PRAPARE - Transportation     Lack of Transportation (Medical): No     Lack of Transportation (Non-Medical): No   Physical Activity: Sufficiently Active (5/6/2025)    Exercise Vital Sign     Days of Exercise per Week: 4 days     Minutes of Exercise per Session: 60 min   Stress: Stress Concern Present (5/6/2025)    [employer] [place] [employer] - Occupational Stress Questionnaire     Feeling of Stress : To some extent   Housing Stability: Low Risk  (5/6/2025)    Housing Stability Vital Sign     Unable to Pay for Housing in the Last Year: No     Homeless in the Last Year: No

## 2025-08-04 ENCOUNTER — CLINICAL SUPPORT (OUTPATIENT)
Dept: OBSTETRICS AND GYNECOLOGY | Facility: CLINIC | Age: 65
End: 2025-08-04
Payer: COMMERCIAL

## 2025-08-04 ENCOUNTER — LAB VISIT (OUTPATIENT)
Dept: LAB | Facility: OTHER | Age: 65
End: 2025-08-04
Attending: OBSTETRICS & GYNECOLOGY
Payer: COMMERCIAL

## 2025-08-04 DIAGNOSIS — N95.1 MENOPAUSAL SYNDROME: ICD-10-CM

## 2025-08-04 DIAGNOSIS — N95.1 MENOPAUSAL SYNDROME: Primary | ICD-10-CM

## 2025-08-04 LAB
ABSOLUTE EOSINOPHIL (OHS): 0.07 K/UL
ABSOLUTE MONOCYTE (OHS): 0.48 K/UL (ref 0.3–1)
ABSOLUTE NEUTROPHIL COUNT (OHS): 2.54 K/UL (ref 1.8–7.7)
BASOPHILS # BLD AUTO: 0.05 K/UL
BASOPHILS NFR BLD AUTO: 1 %
ERYTHROCYTE [DISTWIDTH] IN BLOOD BY AUTOMATED COUNT: 12.4 % (ref 11.5–14.5)
HCT VFR BLD AUTO: 44.1 % (ref 37–48.5)
HGB BLD-MCNC: 14.6 GM/DL (ref 12–16)
IMM GRANULOCYTES # BLD AUTO: 0.01 K/UL (ref 0–0.04)
IMM GRANULOCYTES NFR BLD AUTO: 0.2 % (ref 0–0.5)
LYMPHOCYTES # BLD AUTO: 1.96 K/UL (ref 1–4.8)
MCH RBC QN AUTO: 32.9 PG (ref 27–31)
MCHC RBC AUTO-ENTMCNC: 33.1 G/DL (ref 32–36)
MCV RBC AUTO: 99 FL (ref 82–98)
NUCLEATED RBC (/100WBC) (OHS): 0 /100 WBC
PLATELET # BLD AUTO: 337 K/UL (ref 150–450)
PMV BLD AUTO: 9.1 FL (ref 9.2–12.9)
RBC # BLD AUTO: 4.44 M/UL (ref 4–5.4)
RELATIVE EOSINOPHIL (OHS): 1.4 %
RELATIVE LYMPHOCYTE (OHS): 38.4 % (ref 18–48)
RELATIVE MONOCYTE (OHS): 9.4 % (ref 4–15)
RELATIVE NEUTROPHIL (OHS): 49.6 % (ref 38–73)
WBC # BLD AUTO: 5.11 K/UL (ref 3.9–12.7)

## 2025-08-04 PROCEDURE — 99999 PR PBB SHADOW E&M-EST. PATIENT-LVL I: CPT | Mod: PBBFAC,,,

## 2025-08-04 PROCEDURE — 36415 COLL VENOUS BLD VENIPUNCTURE: CPT

## 2025-08-04 PROCEDURE — 96372 THER/PROPH/DIAG INJ SC/IM: CPT | Mod: S$GLB,,, | Performed by: OBSTETRICS & GYNECOLOGY

## 2025-08-04 PROCEDURE — 85025 COMPLETE CBC W/AUTO DIFF WBC: CPT

## 2025-08-04 RX ADMIN — ESTRADIOL VALERATE 10 MG: 20 INJECTION INTRAMUSCULAR at 09:08

## 2025-08-04 RX ADMIN — TESTOSTERONE CYPIONATE 76 MG: 200 INJECTION, SOLUTION INTRAMUSCULAR at 09:08

## 2025-08-19 ENCOUNTER — OFFICE VISIT (OUTPATIENT)
Dept: URGENT CARE | Facility: CLINIC | Age: 65
End: 2025-08-19
Payer: COMMERCIAL

## 2025-08-19 VITALS
TEMPERATURE: 99 F | HEIGHT: 63 IN | DIASTOLIC BLOOD PRESSURE: 76 MMHG | RESPIRATION RATE: 17 BRPM | WEIGHT: 149.25 LBS | SYSTOLIC BLOOD PRESSURE: 149 MMHG | HEART RATE: 81 BPM | OXYGEN SATURATION: 97 % | BODY MASS INDEX: 26.45 KG/M2

## 2025-08-19 DIAGNOSIS — F17.200 NEEDS SMOKING CESSATION EDUCATION: ICD-10-CM

## 2025-08-19 DIAGNOSIS — M70.61 TROCHANTERIC BURSITIS OF RIGHT HIP: ICD-10-CM

## 2025-08-19 DIAGNOSIS — M54.41 ACUTE RIGHT-SIDED LOW BACK PAIN WITH RIGHT-SIDED SCIATICA: Primary | ICD-10-CM

## 2025-08-19 DIAGNOSIS — M46.1 SACROILIAC INFLAMMATION: ICD-10-CM

## 2025-08-19 DIAGNOSIS — I10 HYPERTENSION, UNSPECIFIED TYPE: ICD-10-CM

## 2025-08-19 LAB
BILIRUBIN, UA POC OHS: NEGATIVE
BLOOD, UA POC OHS: ABNORMAL
CLARITY, UA POC OHS: ABNORMAL
COLOR, UA POC OHS: YELLOW
GLUCOSE, UA POC OHS: NEGATIVE
KETONES, UA POC OHS: NEGATIVE
LEUKOCYTES, UA POC OHS: NEGATIVE
NITRITE, UA POC OHS: NEGATIVE
PH, UA POC OHS: 6.5
PROTEIN, UA POC OHS: NEGATIVE
SPECIFIC GRAVITY, UA POC OHS: 1.01
UROBILINOGEN, UA POC OHS: 0.2

## 2025-08-19 PROCEDURE — 87086 URINE CULTURE/COLONY COUNT: CPT | Performed by: PHYSICIAN ASSISTANT

## 2025-08-19 PROCEDURE — 81003 URINALYSIS AUTO W/O SCOPE: CPT | Mod: QW,S$GLB,, | Performed by: PHYSICIAN ASSISTANT

## 2025-08-19 PROCEDURE — 96372 THER/PROPH/DIAG INJ SC/IM: CPT | Mod: S$GLB,,, | Performed by: PHYSICIAN ASSISTANT

## 2025-08-19 PROCEDURE — 99214 OFFICE O/P EST MOD 30 MIN: CPT | Mod: 25,S$GLB,, | Performed by: PHYSICIAN ASSISTANT

## 2025-08-19 RX ORDER — KETOROLAC TROMETHAMINE 30 MG/ML
30 INJECTION, SOLUTION INTRAMUSCULAR; INTRAVENOUS
Status: COMPLETED | OUTPATIENT
Start: 2025-08-19 | End: 2025-08-19

## 2025-08-19 RX ORDER — NAPROXEN 500 MG/1
500 TABLET ORAL 2 TIMES DAILY PRN
Qty: 28 TABLET | Refills: 0 | Status: SHIPPED | OUTPATIENT
Start: 2025-08-19

## 2025-08-19 RX ADMIN — KETOROLAC TROMETHAMINE 30 MG: 30 INJECTION, SOLUTION INTRAMUSCULAR; INTRAVENOUS at 07:08

## 2025-08-20 DIAGNOSIS — Z12.31 OTHER SCREENING MAMMOGRAM: ICD-10-CM

## 2025-08-23 LAB — BACTERIA UR CULT: ABNORMAL

## 2025-09-02 ENCOUNTER — CLINICAL SUPPORT (OUTPATIENT)
Dept: OBSTETRICS AND GYNECOLOGY | Facility: CLINIC | Age: 65
End: 2025-09-02
Payer: COMMERCIAL

## 2025-09-02 DIAGNOSIS — N95.1 MENOPAUSAL SYNDROME: Primary | ICD-10-CM

## 2025-09-02 PROCEDURE — 99999 PR PBB SHADOW E&M-EST. PATIENT-LVL I: CPT | Mod: PBBFAC,,,

## 2025-09-02 RX ADMIN — TESTOSTERONE CYPIONATE 76 MG: 200 INJECTION, SOLUTION INTRAMUSCULAR at 09:09

## 2025-09-02 RX ADMIN — ESTRADIOL VALERATE 10 MG: 20 INJECTION INTRAMUSCULAR at 09:09

## (undated) DEVICE — DRAPE INCISE IOBAN 2 23X17IN

## (undated) DEVICE — MANIPULATOR UTERINE XLG 40MM

## (undated) DEVICE — SUT VICRYL PLUS 0 CT1 36IN

## (undated) DEVICE — CATH FOLEY 16F COUNCIL STA LOC

## (undated) DEVICE — PAD ABD 8X10 STERILE

## (undated) DEVICE — SEE MEDLINE ITEM 146347

## (undated) DEVICE — SYR 10CC LUER LOCK

## (undated) DEVICE — DRAPE STERI INSTRUMENT 1018

## (undated) DEVICE — DECANTER VIAL ASEPTIC TRANSFER

## (undated) DEVICE — TRAY DRY SKIN SCRUB PREP

## (undated) DEVICE — BANDAGE GAUZE 6PLY FLUFF 2X3

## (undated) DEVICE — SEE MEDLINE ITEM 157110

## (undated) DEVICE — SCISSOR 5MMX35CM DIRECT DRIVE

## (undated) DEVICE — TROCAR KII FIOS 5MM X 100MM

## (undated) DEVICE — SOL NS 1000CC

## (undated) DEVICE — PENCIL ELECTROSURG HOLST W/BLD

## (undated) DEVICE — PACK LAPSCP/PELVSCPY III TIBRN

## (undated) DEVICE — PAD PREP 50/CA

## (undated) DEVICE — KIT WING PAD POSITIONING

## (undated) DEVICE — KIT URINE METER 350ML STAT LOC

## (undated) DEVICE — JELLY KY LUBRICATING 5G PACKET

## (undated) DEVICE — GAUZE PETROLATUM 1/2 X 72

## (undated) DEVICE — BLADE SURG STAINLESS STEEL #11

## (undated) DEVICE — GLOVE BIOGEL SKINSENSE PI 6.5

## (undated) DEVICE — DRAPE UNDER BUTTOCKS WITH POUCH

## (undated) DEVICE — GLOVE BIOGEL SKINSENSE PI 7.5

## (undated) DEVICE — SYR 50CC LL

## (undated) DEVICE — TROCAR ENDOPATH XCEL 11MM 10CM

## (undated) DEVICE — IRRIGATOR ENDOSCOPY DISP.

## (undated) DEVICE — SPONGE LAP 18X18 PREWASHED

## (undated) DEVICE — GLOVE BIOGEL SKINSENSE PI 7.0

## (undated) DEVICE — SUT VICRYL CTD 2-0 GI 27 SH

## (undated) DEVICE — PACK LAPAROSCOPY BAPTIST

## (undated) DEVICE — SOL STRL WATER INJ 1000ML BG

## (undated) DEVICE — TROCAR KII FIOS 11MM X 100MM

## (undated) DEVICE — SUT 2/0 27IN PDS II VIO MO

## (undated) DEVICE — SEE MEDLINE ITEM 157131

## (undated) DEVICE — CANNULA ENDOPATH XCEL 5X100MM

## (undated) DEVICE — SEE MEDLINE ITEM 156930

## (undated) DEVICE — SET CYSTO IRRIGATION UNIV SPIK

## (undated) DEVICE — SOL 9P NACL IRR PIC IL

## (undated) DEVICE — NDL INSUF ULTRA VERESS 120MM

## (undated) DEVICE — TROCAR ENDOPATH XCEL 5X100MM

## (undated) DEVICE — SUT 0 VICRYL / UR6 (J603)

## (undated) DEVICE — BLADE SURG STAINLESS STEEL #15

## (undated) DEVICE — SEALER LIGASURE LAP 37CM 5MM

## (undated) DEVICE — TROCAR ENDOPATH XCEL 12X100MM

## (undated) DEVICE — SOL PVP-I SCRUB 7.5% 4OZ

## (undated) DEVICE — BANDAGE KERLIX P/P 2.25IN STER

## (undated) DEVICE — SUT VICRYL 0 27 CT-2

## (undated) DEVICE — SUT 0 V-LOC GR GS-21 TAPER

## (undated) DEVICE — NDL HYPO REG 25G X 1 1/2

## (undated) DEVICE — SEE MEDLINE ITEM 152622

## (undated) DEVICE — SUT MCRYL PLUS 4-0 PS2 27IN

## (undated) DEVICE — UNDERGLOVES BIOGEL PI SZ 7 LF

## (undated) DEVICE — ELECTRODE REM PLYHSV RETURN 9

## (undated) DEVICE — CONTAINER SPECIMEN STRL 4OZ

## (undated) DEVICE — TRAY FOLEY 16FR INFECTION CONT

## (undated) DEVICE — SCRUB 10% POVIDONE IODINE 4OZ

## (undated) DEVICE — SOL WATER STRL IRR 1000ML

## (undated) DEVICE — CABLE ESU BIPOLAR DUAL 12